# Patient Record
Sex: FEMALE | Race: WHITE | Employment: OTHER | URBAN - METROPOLITAN AREA
[De-identification: names, ages, dates, MRNs, and addresses within clinical notes are randomized per-mention and may not be internally consistent; named-entity substitution may affect disease eponyms.]

---

## 2017-10-10 ENCOUNTER — OFFICE VISIT (OUTPATIENT)
Dept: PODIATRY | Age: 67
End: 2017-10-10
Payer: MEDICARE

## 2017-10-10 VITALS
HEART RATE: 79 BPM | WEIGHT: 210 LBS | HEIGHT: 64 IN | DIASTOLIC BLOOD PRESSURE: 86 MMHG | SYSTOLIC BLOOD PRESSURE: 129 MMHG | BODY MASS INDEX: 35.85 KG/M2

## 2017-10-10 DIAGNOSIS — S90.112A CONTUSION OF LEFT GREAT TOE WITHOUT DAMAGE TO NAIL, INITIAL ENCOUNTER: ICD-10-CM

## 2017-10-10 DIAGNOSIS — M79.675 PAIN IN TOE OF LEFT FOOT: ICD-10-CM

## 2017-10-10 DIAGNOSIS — B35.1 ONYCHOMYCOSIS OF TOENAIL: Primary | ICD-10-CM

## 2017-10-10 PROCEDURE — 1090F PRES/ABSN URINE INCON ASSESS: CPT | Performed by: PODIATRIST

## 2017-10-10 PROCEDURE — 99213 OFFICE O/P EST LOW 20 MIN: CPT | Performed by: PODIATRIST

## 2017-10-10 PROCEDURE — 1123F ACP DISCUSS/DSCN MKR DOCD: CPT | Performed by: PODIATRIST

## 2017-10-10 PROCEDURE — 11720 DEBRIDE NAIL 1-5: CPT | Performed by: PODIATRIST

## 2017-10-10 PROCEDURE — G8427 DOCREV CUR MEDS BY ELIG CLIN: HCPCS | Performed by: PODIATRIST

## 2017-10-10 PROCEDURE — 1036F TOBACCO NON-USER: CPT | Performed by: PODIATRIST

## 2017-10-10 PROCEDURE — G8484 FLU IMMUNIZE NO ADMIN: HCPCS | Performed by: PODIATRIST

## 2017-10-10 PROCEDURE — 4040F PNEUMOC VAC/ADMIN/RCVD: CPT | Performed by: PODIATRIST

## 2017-10-10 PROCEDURE — G8400 PT W/DXA NO RESULTS DOC: HCPCS | Performed by: PODIATRIST

## 2017-10-10 PROCEDURE — 3014F SCREEN MAMMO DOC REV: CPT | Performed by: PODIATRIST

## 2017-10-10 PROCEDURE — 3017F COLORECTAL CA SCREEN DOC REV: CPT | Performed by: PODIATRIST

## 2017-10-10 PROCEDURE — G8417 CALC BMI ABV UP PARAM F/U: HCPCS | Performed by: PODIATRIST

## 2017-10-10 ASSESSMENT — ENCOUNTER SYMPTOMS
COLOR CHANGE: 0
SHORTNESS OF BREATH: 0
NAUSEA: 0
DIARRHEA: 0
BACK PAIN: 0

## 2017-10-10 NOTE — PROGRESS NOTES
Olivre Durbin is a 79 y.o. female who presents to the office today with chief complaint of pain to the left big toenail. Chief Complaint   Patient presents with    Nail Problem     painful left hallux toenail   Symptoms began about 1 week(s) ago. Patient complains of injury to the left big toe. Patient states that the foot was stepped on. Pain is rated 4 out of 10 at it's worst and is described as intermittent. Treatments prior to today's visit include: None. Allergies   Allergen Reactions    Ciprofloxacin     Codeine     Lisinopril     Morphine     Norvasc [Amlodipine Besylate]     Penicillins     Sanctura [Trospium] Nausea Only     Bloated, nauseated    Xarelto [Rivaroxaban]        Past Medical History:   Diagnosis Date    Hyperlipidemia     Hypertension        Prior to Admission medications    Medication Sig Start Date End Date Taking?  Authorizing Provider   esomeprazole (NEXIUM) 20 MG delayed release capsule Take 2 capsules by mouth daily 10/5/17  Yes Paradise Gibbs MD   spironolactone (ALDACTONE) 50 MG tablet TAKE ONE TABLET BY MOUTH DAILY 9/29/17  Yes Paradise Gibbs MD   cloNIDine (CATAPRES) 0.3 MG tablet TAKE ONE TABLET BY MOUTH ONCE NIGHTLY 9/5/17  Yes Paradise Gibbs MD   oxybutynin (DITROPAN) 5 MG tablet Take 5 mg by mouth 2 times daily   Yes Historical Provider, MD   simvastatin (ZOCOR) 20 MG tablet Take 1 tablet by mouth daily Indications: High Amount of Fats in the Blood 2/21/17  Yes Paradise Gibbs MD   Calcium Carbonate-Vitamin D (CALCIUM + D PO) Take by mouth Indications: take as directed   Yes Historical Provider, MD   Misc Natural Taulia ([address] [place] [address]) TABS Take by mouth Indications: take as directed   Yes Historical Provider, MD   Multiple Vitamin TABS Take 1 tablet by mouth daily   Yes Historical Provider, MD   Ascorbic Acid (VITAMIN C) 500 MG tablet Take 500 mg by mouth daily Indications: take as directed   Yes Historical Provider, MD   Vitamin E 400 UNITS TABS Take by mouth Indications: take as directed   Yes Historical Provider, MD   cefUROXime (CEFTIN) 250 MG tablet Take 1 tablet by mouth 2 times daily for 5 days 10/9/17 10/14/17  Gareth Garza MD   clindamycin (CLEOCIN) 300 MG capsule Take 1 capsule by mouth See Admin Instructions Take 2 caps 1 hr prior to dental maida't then 1 cap twice daily until gone 3/28/16   Ju Walton MD   fluticasone (FLONASE) 50 MCG/ACT nasal spray 2 sprays by Nasal route daily Indications: Stuffy Nose    Historical Provider, MD       Past Surgical History:   Procedure Laterality Date   [address] and 1992    neck and lower back   [address] [facility],  [address]  2006    CYST REMOVAL  02/2017    upper rt back.  TOTAL HIP ARTHROPLASTY Bilateral 2013, 2015    Dr Jodi Clarke History   Problem Relation Age of Onset    Hypertension Mother     Fainting Mother     Hypertension Sister     Heart Disease Maternal Grandmother        Social History   Substance Use Topics    Smoking status: Never Smoker    Smokeless tobacco: Never Used    Alcohol use 0.0 oz/week     1 Glasses of wine, 1 Cans of beer, 1 Shots of liquor per week      Comment: socialy       Review of Systems   Constitutional: Negative for activity change, appetite change, chills, diaphoresis, fatigue and fever. Respiratory: Negative for shortness of breath. Cardiovascular: Negative for leg swelling. Gastrointestinal: Negative for diarrhea and nausea. Endocrine: Negative for cold intolerance, heat intolerance and polyuria. Musculoskeletal: Positive for arthralgias. Negative for back pain, gait problem, joint swelling and myalgias. Skin: Negative for color change, pallor, rash and wound. Allergic/Immunologic: Negative for environmental allergies and food allergies. Neurological: Negative for dizziness, weakness, light-headedness and numbness. Hematological: Does not bruise/bleed easily.    Psychiatric/Behavioral: Negative for behavioral problems, confusion and self-injury. The patient is not nervous/anxious. Vitals:   Vitals:    10/10/17 0827   BP: 129/86   Pulse: 79       General: AAO x 3 in NAD. Integument: There are no rashes, ulcers, or breaks in the skin noted to the bilateral lower extremities. There is no induration, subcutaneous nodules, or tightening of the skin noted to the bilateral.     Toenails 1-5 of the right foot do not present with thickness, elongation, discoloration, brittleness, subungual debris. Toenails 1 of the left foot do present with thickness, discoloration, brittleness, subungual debris. There is lysis to the distal half of this toenail. There is mild pain with palpation to this toenail. There is no ingrowth noted to this toenail. There is no erythema, calor, or edema noted to the left hallux. Interdigital maceration absent to web spaces 1-4, Bilateral.     There are no preulcerative lesions noted to the right foot. There are no preulcerative lesions noted to the left foot. The skin to the bilateral feet is not thin and shiny. The skin to the bilateral feet is warm, supple, and dry. Vascular: DP pulse of the right foot is  palpable. DP pulse of the left foot is  palpable. PT pulse of the right foot is  palpable. PT pulse of the left foot is  palpable. CFT is less than 3 secs to the digits of the right foot. CFT is less than 3 secs to the digits of the left foot. There is no edema noted to the bilateral foot or ankle. There is hair growth noted to the digits of the bilateral feet. There are no varicosities noted to the right foot/ankle. There are no varicosities noted to the left foot/ankle. Erythema is absent to the bilateral feet. Neurological: Reflexes are present to the right plantar foot and to the Achilles tendon. Reflexes are present to the left plantar foot and to the Achilles tendon.      Epicritic sensation is

## 2018-01-23 ENCOUNTER — OFFICE VISIT (OUTPATIENT)
Dept: PODIATRY | Age: 68
End: 2018-01-23
Payer: MEDICARE

## 2018-01-23 VITALS
DIASTOLIC BLOOD PRESSURE: 79 MMHG | WEIGHT: 205 LBS | BODY MASS INDEX: 34.16 KG/M2 | HEIGHT: 65 IN | HEART RATE: 72 BPM | SYSTOLIC BLOOD PRESSURE: 117 MMHG

## 2018-01-23 DIAGNOSIS — B35.1 ONYCHOMYCOSIS OF TOENAIL: Primary | ICD-10-CM

## 2018-01-23 DIAGNOSIS — M79.675 PAIN IN TOE OF LEFT FOOT: ICD-10-CM

## 2018-01-23 DIAGNOSIS — L60.0 OC (ONYCHOCRYPTOSIS): ICD-10-CM

## 2018-01-23 PROCEDURE — G8400 PT W/DXA NO RESULTS DOC: HCPCS | Performed by: PODIATRIST

## 2018-01-23 PROCEDURE — 1036F TOBACCO NON-USER: CPT | Performed by: PODIATRIST

## 2018-01-23 PROCEDURE — 99213 OFFICE O/P EST LOW 20 MIN: CPT | Performed by: PODIATRIST

## 2018-01-23 PROCEDURE — 11720 DEBRIDE NAIL 1-5: CPT | Performed by: PODIATRIST

## 2018-01-23 PROCEDURE — G8484 FLU IMMUNIZE NO ADMIN: HCPCS | Performed by: PODIATRIST

## 2018-01-23 PROCEDURE — G8427 DOCREV CUR MEDS BY ELIG CLIN: HCPCS | Performed by: PODIATRIST

## 2018-01-23 PROCEDURE — 3014F SCREEN MAMMO DOC REV: CPT | Performed by: PODIATRIST

## 2018-01-23 PROCEDURE — 4040F PNEUMOC VAC/ADMIN/RCVD: CPT | Performed by: PODIATRIST

## 2018-01-23 PROCEDURE — G8417 CALC BMI ABV UP PARAM F/U: HCPCS | Performed by: PODIATRIST

## 2018-01-23 PROCEDURE — 1123F ACP DISCUSS/DSCN MKR DOCD: CPT | Performed by: PODIATRIST

## 2018-01-23 PROCEDURE — 1090F PRES/ABSN URINE INCON ASSESS: CPT | Performed by: PODIATRIST

## 2018-01-23 PROCEDURE — 3017F COLORECTAL CA SCREEN DOC REV: CPT | Performed by: PODIATRIST

## 2018-01-23 ASSESSMENT — ENCOUNTER SYMPTOMS
COLOR CHANGE: 0
BACK PAIN: 0
SHORTNESS OF BREATH: 0
DIARRHEA: 0
NAUSEA: 0

## 2018-01-23 NOTE — PROGRESS NOTES
Subjective: Kate Lomax 79 y.o. female that presents with chief complaint of pain to the left great toenail. Chief Complaint   Patient presents with    Nail Problem     possible ingrown left hallux    Patient states that this has been present since just passed last visit. Patient states that she thinks the nail is ingrown again. Patient states that the pain is greatest with shoe wear. Pain is rated 6 out of 10 and is described as intermittent. Review of Systems   Constitutional: Negative for activity change, appetite change, chills, diaphoresis, fatigue and fever. Respiratory: Negative for shortness of breath. Cardiovascular: Negative for leg swelling. Gastrointestinal: Negative for diarrhea and nausea. Endocrine: Negative for cold intolerance, heat intolerance and polyuria. Musculoskeletal: Positive for arthralgias. Negative for back pain, gait problem, joint swelling and myalgias. Skin: Negative for color change, pallor, rash and wound. Allergic/Immunologic: Negative for environmental allergies and food allergies. Neurological: Negative for dizziness, weakness, light-headedness and numbness. Hematological: Does not bruise/bleed easily. Psychiatric/Behavioral: Negative for behavioral problems, confusion and self-injury. The patient is not nervous/anxious. Objective: Clinical evaluation of the patient reveals thickness, discoloration, brittleness, elongation, and subungual debris to the left hallux toenail. There is incurvation noted to the bilateral borders of the left hallux toenail. There is no true ingrowth noted. There is no erythema, calor, drainage, or malodor noted to the left hallux. There is pain with palpation to the bilateral borders of the left hallux. Assessment:   1. Onychomycosis of toenail  NM DEBRIDEMENT OF NAIL(S), 1-5   2. OC (onychocryptosis)  NM DEBRIDEMENT OF NAIL(S), 1-5   3.  Pain in toe of left foot  NM DEBRIDEMENT OF NAIL(S), 1-5         Plan:

## 2018-08-06 PROBLEM — M23.91 ACUTE INTERNAL DERANGEMENT OF RIGHT KNEE: Status: ACTIVE | Noted: 2018-08-06

## 2018-08-21 ENCOUNTER — ANESTHESIA EVENT (OUTPATIENT)
Dept: OPERATING ROOM | Age: 68
End: 2018-08-21
Payer: MEDICARE

## 2018-08-21 ENCOUNTER — HOSPITAL ENCOUNTER (OUTPATIENT)
Age: 68
Setting detail: OUTPATIENT SURGERY
Discharge: HOME | End: 2018-08-21
Attending: SURGERY | Admitting: SURGERY
Payer: MEDICARE

## 2018-08-21 ENCOUNTER — ANESTHESIA (OUTPATIENT)
Dept: OPERATING ROOM | Age: 68
End: 2018-08-21
Payer: MEDICARE

## 2018-08-21 VITALS
HEART RATE: 74 BPM | OXYGEN SATURATION: 98 % | TEMPERATURE: 98.1 F | DIASTOLIC BLOOD PRESSURE: 80 MMHG | RESPIRATION RATE: 16 BRPM | SYSTOLIC BLOOD PRESSURE: 145 MMHG

## 2018-08-21 VITALS
OXYGEN SATURATION: 99 % | RESPIRATION RATE: 22 BRPM | TEMPERATURE: 92.8 F | SYSTOLIC BLOOD PRESSURE: 92 MMHG | DIASTOLIC BLOOD PRESSURE: 53 MMHG

## 2018-08-21 LAB
EKG ATRIAL RATE: 73 BPM
EKG P AXIS: 46 DEGREES
EKG P-R INTERVAL: 150 MS
EKG Q-T INTERVAL: 402 MS
EKG QRS DURATION: 82 MS
EKG QTC CALCULATION (BAZETT): 442 MS
EKG R AXIS: 13 DEGREES
EKG T AXIS: 37 DEGREES
EKG VENTRICULAR RATE: 73 BPM

## 2018-08-21 PROCEDURE — 93005 ELECTROCARDIOGRAM TRACING: CPT

## 2018-08-21 PROCEDURE — 7100000001 HC PACU RECOVERY - ADDTL 15 MIN: Performed by: SURGERY

## 2018-08-21 PROCEDURE — 7100000000 HC PACU RECOVERY - FIRST 15 MIN: Performed by: SURGERY

## 2018-08-21 PROCEDURE — 3700000000 HC ANESTHESIA ATTENDED CARE: Performed by: SURGERY

## 2018-08-21 PROCEDURE — 7100000031 HC ASPR PHASE II RECOVERY - ADDTL 15 MIN: Performed by: SURGERY

## 2018-08-21 PROCEDURE — 2580000003 HC RX 258: Performed by: SURGERY

## 2018-08-21 PROCEDURE — 2500000003 HC RX 250 WO HCPCS

## 2018-08-21 PROCEDURE — 6360000002 HC RX W HCPCS

## 2018-08-21 PROCEDURE — 88305 TISSUE EXAM BY PATHOLOGIST: CPT

## 2018-08-21 PROCEDURE — 7100000011 HC PHASE II RECOVERY - ADDTL 15 MIN: Performed by: SURGERY

## 2018-08-21 PROCEDURE — 7100000010 HC PHASE II RECOVERY - FIRST 15 MIN: Performed by: SURGERY

## 2018-08-21 PROCEDURE — 2709999900 HC NON-CHARGEABLE SUPPLY: Performed by: SURGERY

## 2018-08-21 PROCEDURE — 3609010600 HC COLONOSCOPY POLYPECTOMY SNARE/COLD BIOPSY: Performed by: SURGERY

## 2018-08-21 PROCEDURE — 3700000001 HC ADD 15 MINUTES (ANESTHESIA): Performed by: SURGERY

## 2018-08-21 PROCEDURE — 7100000030 HC ASPR PHASE II RECOVERY - FIRST 15 MIN: Performed by: SURGERY

## 2018-08-21 RX ORDER — LIDOCAINE HYDROCHLORIDE 10 MG/ML
INJECTION, SOLUTION EPIDURAL; INFILTRATION; INTRACAUDAL; PERINEURAL PRN
Status: DISCONTINUED | OUTPATIENT
Start: 2018-08-21 | End: 2018-08-21 | Stop reason: SDUPTHER

## 2018-08-21 RX ORDER — DIPHENHYDRAMINE HYDROCHLORIDE 50 MG/ML
12.5 INJECTION INTRAMUSCULAR; INTRAVENOUS
Status: DISCONTINUED | OUTPATIENT
Start: 2018-08-21 | End: 2018-08-21 | Stop reason: HOSPADM

## 2018-08-21 RX ORDER — MIDAZOLAM HYDROCHLORIDE 1 MG/ML
INJECTION INTRAMUSCULAR; INTRAVENOUS PRN
Status: DISCONTINUED | OUTPATIENT
Start: 2018-08-21 | End: 2018-08-21 | Stop reason: SDUPTHER

## 2018-08-21 RX ORDER — ONDANSETRON 2 MG/ML
INJECTION INTRAMUSCULAR; INTRAVENOUS PRN
Status: DISCONTINUED | OUTPATIENT
Start: 2018-08-21 | End: 2018-08-21 | Stop reason: SDUPTHER

## 2018-08-21 RX ORDER — LABETALOL HYDROCHLORIDE 5 MG/ML
5 INJECTION, SOLUTION INTRAVENOUS EVERY 10 MIN PRN
Status: DISCONTINUED | OUTPATIENT
Start: 2018-08-21 | End: 2018-08-21 | Stop reason: HOSPADM

## 2018-08-21 RX ORDER — ONDANSETRON 2 MG/ML
4 INJECTION INTRAMUSCULAR; INTRAVENOUS
Status: DISCONTINUED | OUTPATIENT
Start: 2018-08-21 | End: 2018-08-21 | Stop reason: HOSPADM

## 2018-08-21 RX ORDER — DEXAMETHASONE SODIUM PHOSPHATE 4 MG/ML
INJECTION, SOLUTION INTRA-ARTICULAR; INTRALESIONAL; INTRAMUSCULAR; INTRAVENOUS; SOFT TISSUE PRN
Status: DISCONTINUED | OUTPATIENT
Start: 2018-08-21 | End: 2018-08-21 | Stop reason: SDUPTHER

## 2018-08-21 RX ORDER — FENTANYL CITRATE 50 UG/ML
INJECTION, SOLUTION INTRAMUSCULAR; INTRAVENOUS PRN
Status: DISCONTINUED | OUTPATIENT
Start: 2018-08-21 | End: 2018-08-21 | Stop reason: SDUPTHER

## 2018-08-21 RX ORDER — MORPHINE SULFATE 2 MG/ML
2 INJECTION, SOLUTION INTRAMUSCULAR; INTRAVENOUS EVERY 5 MIN PRN
Status: DISCONTINUED | OUTPATIENT
Start: 2018-08-21 | End: 2018-08-21 | Stop reason: HOSPADM

## 2018-08-21 RX ORDER — PROPOFOL 10 MG/ML
INJECTION, EMULSION INTRAVENOUS PRN
Status: DISCONTINUED | OUTPATIENT
Start: 2018-08-21 | End: 2018-08-21 | Stop reason: SDUPTHER

## 2018-08-21 RX ORDER — MEPERIDINE HYDROCHLORIDE 50 MG/ML
12.5 INJECTION INTRAMUSCULAR; INTRAVENOUS; SUBCUTANEOUS EVERY 5 MIN PRN
Status: DISCONTINUED | OUTPATIENT
Start: 2018-08-21 | End: 2018-08-21 | Stop reason: HOSPADM

## 2018-08-21 RX ORDER — SODIUM CHLORIDE, SODIUM LACTATE, POTASSIUM CHLORIDE, CALCIUM CHLORIDE 600; 310; 30; 20 MG/100ML; MG/100ML; MG/100ML; MG/100ML
INJECTION, SOLUTION INTRAVENOUS CONTINUOUS
Status: DISCONTINUED | OUTPATIENT
Start: 2018-08-21 | End: 2018-08-21 | Stop reason: HOSPADM

## 2018-08-21 RX ADMIN — LIDOCAINE HYDROCHLORIDE 50 MG: 10 INJECTION, SOLUTION EPIDURAL; INFILTRATION; INTRACAUDAL; PERINEURAL at 10:24

## 2018-08-21 RX ADMIN — DEXAMETHASONE SODIUM PHOSPHATE 4 MG: 4 INJECTION, SOLUTION INTRAMUSCULAR; INTRAVENOUS at 10:30

## 2018-08-21 RX ADMIN — ONDANSETRON 4 MG: 2 INJECTION INTRAMUSCULAR; INTRAVENOUS at 10:30

## 2018-08-21 RX ADMIN — MIDAZOLAM 2 MG: 1 INJECTION INTRAMUSCULAR; INTRAVENOUS at 10:19

## 2018-08-21 RX ADMIN — SODIUM CHLORIDE, POTASSIUM CHLORIDE, SODIUM LACTATE AND CALCIUM CHLORIDE: 600; 310; 30; 20 INJECTION, SOLUTION INTRAVENOUS at 09:37

## 2018-08-21 RX ADMIN — FENTANYL CITRATE 50 MCG: 50 INJECTION, SOLUTION INTRAMUSCULAR; INTRAVENOUS at 10:39

## 2018-08-21 RX ADMIN — PROPOFOL 150 MG: 10 INJECTION, EMULSION INTRAVENOUS at 10:24

## 2018-08-21 ASSESSMENT — ENCOUNTER SYMPTOMS
STRIDOR: 0
SHORTNESS OF BREATH: 1

## 2018-08-21 ASSESSMENT — PULMONARY FUNCTION TESTS
PIF_VALUE: 11
PIF_VALUE: 10
PIF_VALUE: 15
PIF_VALUE: 1
PIF_VALUE: 16
PIF_VALUE: 15
PIF_VALUE: 16
PIF_VALUE: 15
PIF_VALUE: 14
PIF_VALUE: 14
PIF_VALUE: 1
PIF_VALUE: 7
PIF_VALUE: 16
PIF_VALUE: 14
PIF_VALUE: 11
PIF_VALUE: 1
PIF_VALUE: 11
PIF_VALUE: 12
PIF_VALUE: 11
PIF_VALUE: 14
PIF_VALUE: 18
PIF_VALUE: 15
PIF_VALUE: 16
PIF_VALUE: 13
PIF_VALUE: 16
PIF_VALUE: 25
PIF_VALUE: 16
PIF_VALUE: 14
PIF_VALUE: 11
PIF_VALUE: 14
PIF_VALUE: 11
PIF_VALUE: 16
PIF_VALUE: 11
PIF_VALUE: 12
PIF_VALUE: 15
PIF_VALUE: 14
PIF_VALUE: 16
PIF_VALUE: 15
PIF_VALUE: 13
PIF_VALUE: 14
PIF_VALUE: 16
PIF_VALUE: 13
PIF_VALUE: 0
PIF_VALUE: 11
PIF_VALUE: 16
PIF_VALUE: 15
PIF_VALUE: 16
PIF_VALUE: 15
PIF_VALUE: 14
PIF_VALUE: 16
PIF_VALUE: 16

## 2018-08-21 ASSESSMENT — PAIN SCALES - GENERAL
PAINLEVEL_OUTOF10: 0

## 2018-08-21 ASSESSMENT — LIFESTYLE VARIABLES: SMOKING_STATUS: 0

## 2018-08-21 NOTE — ANESTHESIA POSTPROCEDURE EVALUATION
POST- ANESTHESIA EVALUATION       Pt Name: Ceferino Barth  MRN: [id number]  YOB: 1950  Date of evaluation: 8/21/2018  Time:  2:26 PM      BP (!) 145/80   Pulse 74   Temp 98.1 °F (36.7 °C) (Temporal)   Resp 16   SpO2 98%      Consciousness Level  Awake  Cardiopulmonary Status  Stable  Pain Adequately Treated YES  Nausea / Vomiting  NO  Adequate Hydration  YES  Anesthesia Related Complications NONE      Electronically signed by Selvin Stuart MD on 8/21/2018 at 2:26 PM       Department of Anesthesiology  Postprocedure Note    Patient: Ceferino Barth  MRN: [id number]  YOB: 1950  Date of evaluation: 8/21/2018  Time:  2:25 PM     Procedure Summary     Date:  08/21/18 Room / Location:  [address] [place] [address] [address] / [facility] [address]    Anesthesia Start:  1019 Anesthesia Stop:  1121    Procedure:  COLONOSCOPY POLYPECTOMY /COLD BX  (N/A ) Diagnosis:  (HISTORY COLON POLYPS   PAT ON ADMIT OFFICE FAX )    Surgeon:  Shi Kimball MD Responsible Provider:  Selvin Stuart MD    Anesthesia Type:  general ASA Status:  3          Anesthesia Type: general    Kevin Phase I: Kevin Score: 10    Kevin Phase II: Kevin Score: 10    Last vitals: Reviewed and per EMR flowsheets.        Anesthesia Post Evaluation

## 2018-08-21 NOTE — H&P
HISTORY and [place] [place] [address]       NAME:  Godwin Pedroza  MRN: [id number]   YOB: 1950   Date: 8/21/2018   Age: 76 y.o. Gender: female       COMPLAINT AND PRESENT HISTORY:   Abagail Gitelman is a 76 yr old female having a colonoscopy today, She had one last about 3 yr ago , She had a polyp then, She has no blood in stool or change in bowel habits,   PAST MEDICAL HISTORY     Past Medical History:   Diagnosis Date    Hyperlipidemia     Hypertension          SURGICAL HISTORY       Past Surgical History:   Procedure Laterality Date    BACK SURGERY  1983 and 1992    neck and lower back    BLADDER SURGERY  2006    CATARACT REMOVAL      COLONOSCOPY  2015    had polyp, done at [facility]. Dr Tonya Jean  02/2017    upper rt back.  TOTAL HIP ARTHROPLASTY Bilateral 2013, 2015    Dr Jackelin Dorman History     Social History    Marital status:      Spouse name: N/A    Number of children: N/A    Years of education: N/A     Social History Main Topics    Smoking status: Never Smoker    Smokeless tobacco: Never Used    Alcohol use 0.0 oz/week     1 Glasses of wine, 1 Cans of beer, 1 Shots of liquor per week      Comment: socialy    Drug use: No    Sexual activity: Not Asked     Other Topics Concern    None     Social History Narrative    None           REVIEW OF SYSTEMS      Allergies   Allergen Reactions    Ciprofloxacin     Codeine     Lisinopril     Morphine     Norvasc [Amlodipine Besylate]     Penicillins     Sanctura [Trospium] Nausea Only     Bloated, nauseated    Xarelto [Rivaroxaban]        No current facility-administered medications on file prior to encounter.       Current Outpatient Prescriptions on File Prior to Encounter   Medication Sig Dispense Refill    spironolactone (ALDACTONE) 50 MG tablet TAKE ONE TABLET BY MOUTH DAILY 90 tablet 3    cloNIDine (CATAPRES) 0.3 MG tablet TAKE ONE TABLET BY MOUTH ONCE NIGHTLY 90 tablet 1    simvastatin (ZOCOR) 20 MG tablet TAKE ONE TABLET BY MOUTH DAILY 90 tablet 2    esomeprazole (NEXIUM) 20 MG delayed release capsule Take 2 capsules by mouth daily 60 capsule 3    oxybutynin (DITROPAN) 5 MG tablet Take 5 mg by mouth 2 times daily      Calcium Carbonate-Vitamin D (CALCIUM + D PO) Take by mouth Indications: take as directed      Misc Natural Products (GLUCOSAMINE CHONDROITIN COMPLX) TABS Take by mouth Indications: take as directed      Multiple Vitamin TABS Take 1 tablet by mouth daily      Ascorbic Acid (VITAMIN C) 500 MG tablet Take 500 mg by mouth daily Indications: take as directed      Vitamin E 400 UNITS TABS Take by mouth Indications: take as directed      fluticasone (FLONASE) 50 MCG/ACT nasal spray 2 sprays by Nasal route daily Indications: Stuffy Nose      clindamycin (CLEOCIN) 300 MG capsule Take 1 capsule by mouth See Admin Instructions Take 2 caps 1 hr prior to dental maida't then 1 cap twice daily until gone 6 capsule 0       Negative except for what is mentioned in the HPI. GENERAL PHYSICAL EXAM     Vitals: /82   Pulse 73   Temp 98.4 °F (36.9 °C)   Resp 16   SpO2 99%  There is no height or weight on file to calculate BMI. GENERAL APPEARANCE:   Rj Nurse is 76 y.o.,  female, mildly obese, Alert and good hygiene, Denies any pain at this time. SKIN:  Warm, dry, no rashes              HEAD:  Normocephalic, Face symmetrical.                  EYES:  KURT EOMI and conjugate gaze . EARS:  Ear canals clear, No marked hearing loss. NOSE:  Nares are patent, Mucosa pink and moist .                  THROAT:  Pharynx is clear No loose dentition                   NECK:   Has no carotid bruit,   Neck is supple,   No adenopathy. CHEST:  Symmetrical and equal on expansion.                  HEART:  HT regular No murmer,                LUNGS:  Breath sounds are clear and no wheezes,           ABDOMEN:   Abdomen is rounded and not tender and is  soft on palpation. No guarding or rigidity. No palpable organomegaly. LYMPHATICS:  No palpable cervical lymphadenopathy. LOCOMOTOR, BACK AND SPINE:  Spine is not tender and has no CVA pain                  EXTREMITIES:  Good range of motion of upper and lower extremities, No pedal edema. Grasp strength is - bilaterally     NEUROLOGIC:  Alert and has no problems with speech, No motor deficits.                       PROVISIONAL DIAGNOSES / SURGERY:      For Colonoscopy  Patient Active Problem List    Diagnosis Date Noted    Acute internal derangement of right knee 08/06/2018    Asthma 11/06/2015    Cervical disc disease 11/06/2015    Esophageal reflux 11/06/2015    Fatigue 11/06/2015    Hip joint stiffness 11/06/2015    Hyperlipidemia 11/06/2015    Hypertension 11/06/2015    Hypokalemia 11/06/2015    Joint pain, hip 11/06/2015    Lower back pain 11/06/2015    Muscle spasm 11/06/2015    Nasal congestion 11/06/2015    Neck pain 11/06/2015    Osteoarthritis of hip 11/06/2015    Osteoarthritis of neck 11/06/2015    Sacroiliac strain 11/06/2015    Scoliosis 11/06/2015    Shortness of breath 11/06/2015    Snoring 11/06/2015    Tachycardia 11/06/2015    Urinary incontinence 11/06/2015    Weight gain 11/06/2015           YARA Prince - CNP on 8/21/2018 at 9:41 AM

## 2018-08-21 NOTE — OP NOTE
PROCEDURE NOTE    DATE OF PROCEDURE: 8/21/2018    SURGEON: Lacy Bourne MD    ASSISTANT: None    PREOPERATIVE DIAGNOSIS: History of colon polyp    POSTOPERATIVE DIAGNOSIS: Rectal polyp/sigmoid diverticulosis/redundant colon/suboptimal prep    OPERATION: Total colonoscopy to cecum with rectal polypectomy with large cold biopsy forceps    ANESTHESIA: General    ESTIMATED BLOOD LOSS: None    COMPLICATIONS: None     SPECIMENS:  Was Obtained: Rectal polyp    HISTORY: The patient is a 76y.o. year old female with history of above preop diagnosis. I recommended colonoscopy with possible biopsy or polypectomy and I explained the risk, benefits, expected outcome, and alternatives to the procedure. Risks included but are not limited to bleeding, infection, respiratory distress, hypotension, and perforation of the colon and possibility of missing a lesion. The patient understands and is in agreement. PROCEDURE: The patient was given IV conscious sedation. The patient's SPO2 remained above 90% throughout the procedure. Digital rectal exam was normal.  The colonoscope was inserted through the anus into the rectum and advanced under direct vision to the cecum without difficulty. Terminal ileum was examined for approximately 2 inches. The prep was Suboptimal.      Findings:    Cecum/Ascending colon: normal    Transverse colon: normal    Descending/Sigmoid colon: abnormal: Mild sigmoid diverticulosis    Rectum/Anus: examined in normal and retroflexed positions and was abnormal: Rectal polyp removed with large core biopsy forceps  All the above areas were examined and visualization was limited because of suboptimal prep but no other gross lesions colitis seen. Withdrawal Time was (minutes): 14      Next screening colonoscopy: 3 years. If screening is less than 10 years the recommended reason is due:polyps    The colon was decompressed.   While withdrawing the scope the above findings were verified and the scope was removed. The patient tolerated the procedure and conscious sedation without unusual events. In the recovery room patient was examined and remains hemodynamically stable. Discharge home when criteria met. Recommendations/Plan:   1. F/U Biopsies  2. F/U In Office as instructed  3. Discussed with the family  4. High fiber diet   5.  Precautions to avoid constipation    Electronically signed by Corby Chiang MD  on 8/21/2018 at 11:12 AM

## 2018-08-21 NOTE — ANESTHESIA PRE PROCEDURE
07/02/18 128/86       NPO Status:                                                                                 BMI:   Wt Readings from Last 3 Encounters:   08/06/18 215 lb (97.5 kg)   07/30/18 215 lb 9.6 oz (97.8 kg)   07/02/18 214 lb 3.2 oz (97.2 kg)     There is no height or weight on file to calculate BMI.    CBC: No results found for: WBC, RBC, HGB, HCT, MCV, RDW, PLT    CMP: No results found for: NA, K, CL, CO2, BUN, CREATININE, GFRAA, AGRATIO, LABGLOM, GLUCOSE, PROT, CALCIUM, BILITOT, ALKPHOS, AST, ALT    POC Tests: No results for input(s): POCGLU, POCNA, POCK, POCCL, POCBUN, POCHEMO, POCHCT in the last 72 hours. Coags: No results found for: PROTIME, INR, APTT    HCG (If Applicable): No results found for: PREGTESTUR, PREGSERUM, HCG, HCGQUANT     ABGs: No results found for: PHART, PO2ART, FJS3QZS, ZZC5JTG, BEART, K7PHCHFT     Type & Screen (If Applicable):  No results found for: [facility]    Anesthesia Evaluation  Patient summary reviewed no history of anesthetic complications:   Airway: Mallampati: III  TM distance: >3 FB   Neck ROM: full  Mouth opening: > = 3 FB Dental: normal exam         Pulmonary:normal exam    (+) shortness of breath: no interval change,  asthma: allergic asthma,     (-) pneumonia, COPD, recent URI, sleep apnea, rhonchi, wheezes, rales, stridor and not a current smoker          Patient did not smoke on day of surgery.                  Cardiovascular:  Exercise tolerance: good (>4 METS),   (+) hypertension: no interval change,     (-) pacemaker, valvular problems/murmurs, past MI, CAD, CABG/stent, dysrhythmias,  angina,  CHF, orthopnea, PND,  ALVAREZ, murmur, weak pulses,  friction rub, systolic click, carotid bruit,  JVD and peripheral edema      Rhythm: regular  Rate: normal           Beta Blocker:  Not on Beta Blocker         Neuro/Psych:   Negative Neuro/Psych ROS     (-) seizures, neuromuscular disease, TIA, CVA, headaches, psychiatric history and depression/anxiety

## 2018-08-22 LAB — SURGICAL PATHOLOGY REPORT: NORMAL

## 2018-09-20 ENCOUNTER — OFFICE VISIT (OUTPATIENT)
Dept: ORTHOPEDIC SURGERY | Age: 68
End: 2018-09-20
Payer: MEDICARE

## 2018-09-20 VITALS — OXYGEN SATURATION: 97 % | BODY MASS INDEX: 34.15 KG/M2 | HEIGHT: 64 IN | WEIGHT: 200 LBS | HEART RATE: 91 BPM

## 2018-09-20 DIAGNOSIS — S89.90XA KNEE INJURY, INITIAL ENCOUNTER: Primary | ICD-10-CM

## 2018-09-20 PROCEDURE — G8400 PT W/DXA NO RESULTS DOC: HCPCS | Performed by: ORTHOPAEDIC SURGERY

## 2018-09-20 PROCEDURE — 1123F ACP DISCUSS/DSCN MKR DOCD: CPT | Performed by: ORTHOPAEDIC SURGERY

## 2018-09-20 PROCEDURE — G8417 CALC BMI ABV UP PARAM F/U: HCPCS | Performed by: ORTHOPAEDIC SURGERY

## 2018-09-20 PROCEDURE — G8428 CUR MEDS NOT DOCUMENT: HCPCS | Performed by: ORTHOPAEDIC SURGERY

## 2018-09-20 PROCEDURE — 1101F PT FALLS ASSESS-DOCD LE1/YR: CPT | Performed by: ORTHOPAEDIC SURGERY

## 2018-09-20 PROCEDURE — 4040F PNEUMOC VAC/ADMIN/RCVD: CPT | Performed by: ORTHOPAEDIC SURGERY

## 2018-09-20 PROCEDURE — 1036F TOBACCO NON-USER: CPT | Performed by: ORTHOPAEDIC SURGERY

## 2018-09-20 PROCEDURE — 99203 OFFICE O/P NEW LOW 30 MIN: CPT | Performed by: ORTHOPAEDIC SURGERY

## 2018-09-20 PROCEDURE — 3014F SCREEN MAMMO DOC REV: CPT | Performed by: ORTHOPAEDIC SURGERY

## 2018-09-20 PROCEDURE — 3017F COLORECTAL CA SCREEN DOC REV: CPT | Performed by: ORTHOPAEDIC SURGERY

## 2018-09-20 PROCEDURE — 1090F PRES/ABSN URINE INCON ASSESS: CPT | Performed by: ORTHOPAEDIC SURGERY

## 2018-09-20 NOTE — PROGRESS NOTES
Dharmesh Paredes M.D.            [address] [facility] [address] [facility] [address].             Dept Phone: [phone]             Dept Fax:  [phone] Shellie Mcgee returns today with a new issue regarding her right knee. She status post bilateral total hips in the past.  She states that she was at the [facility] fair this past July coming down the slide with her granddaughter her leg got caught behind her on her right side. She's had no previous workup. She's describes stabbing catching pain in right knee. She states that she feels like a something's stocking inside. Examination patient right knee notes a slight effusion. Her motion is 2125 degrees. She does have a positive reproducible Manny's medially. Collaterals are good cruciates are good no Tender stable Homans    Patient did have an MRI that actually was performed today at the [facility]. We do not have report but I was able to visualize this. On the patient's sagittal T2 stir I was able to visualize a type III change within the posterior horn medial meniscus consistent with a meniscus tear. This is compatible with her physical examination    Impression  Medial meniscus tear right knee    Plan  Patient was advised that arthroscopic intervention may be indicated she does wish proceed as she is affected by this. She was made aware risk and benefits. We'll see her back here postoperatively          Review of Systems   Constitutional: Negative. HENT: Negative. Respiratory: Negative. Cardiovascular: Negative. Musculoskeletal: Negative. Neurological: Negative. Past Medical History:   Diagnosis Date    Hyperlipidemia     Hypertension      Past Surgical History:   Procedure Laterality Date    BACK SURGERY  1983 and 1992    neck and lower back    BLADDER SURGERY  2006    CATARACT REMOVAL      COLONOSCOPY  2015    had polyp, done at [facility].  Dr Sang Syed

## 2018-09-25 ENCOUNTER — HOSPITAL ENCOUNTER (OUTPATIENT)
Dept: PREADMISSION TESTING | Age: 68
Discharge: HOME | End: 2018-09-29
Payer: MEDICARE

## 2018-09-25 VITALS
SYSTOLIC BLOOD PRESSURE: 131 MMHG | TEMPERATURE: 98.6 F | WEIGHT: 214 LBS | OXYGEN SATURATION: 96 % | DIASTOLIC BLOOD PRESSURE: 86 MMHG | BODY MASS INDEX: 36.54 KG/M2 | HEIGHT: 64 IN | RESPIRATION RATE: 16 BRPM | HEART RATE: 78 BPM

## 2018-09-25 LAB
ABSOLUTE EOS #: 0.1 K/UL (ref 0–0.4)
ABSOLUTE IMMATURE GRANULOCYTE: ABNORMAL K/UL (ref 0–0.3)
ABSOLUTE LYMPH #: 1.9 K/UL (ref 1–4.8)
ABSOLUTE MONO #: 0.6 K/UL (ref 0.1–1.3)
ANION GAP SERPL CALCULATED.3IONS-SCNC: 13 MMOL/L (ref 9–17)
BASOPHILS # BLD: 1 % (ref 0–2)
BASOPHILS ABSOLUTE: 0 K/UL (ref 0–0.2)
BUN BLDV-MCNC: 17 MG/DL (ref 8–23)
BUN/CREAT BLD: ABNORMAL (ref 9–20)
CALCIUM SERPL-MCNC: 9.2 MG/DL (ref 8.6–10.4)
CHLORIDE BLD-SCNC: 104 MMOL/L (ref 98–107)
CO2: 25 MMOL/L (ref 20–31)
CREAT SERPL-MCNC: 1.21 MG/DL (ref 0.5–0.9)
DIFFERENTIAL TYPE: ABNORMAL
EOSINOPHILS RELATIVE PERCENT: 2 % (ref 0–4)
GFR AFRICAN AMERICAN: 54 ML/MIN
GFR NON-AFRICAN AMERICAN: 44 ML/MIN
GFR SERPL CREATININE-BSD FRML MDRD: ABNORMAL ML/MIN/{1.73_M2}
GFR SERPL CREATININE-BSD FRML MDRD: ABNORMAL ML/MIN/{1.73_M2}
GLUCOSE BLD-MCNC: 98 MG/DL (ref 70–99)
HCT VFR BLD CALC: 44.5 % (ref 36–46)
HEMOGLOBIN: 14.5 G/DL (ref 12–16)
IMMATURE GRANULOCYTES: ABNORMAL %
LYMPHOCYTES # BLD: 29 % (ref 24–44)
MCH RBC QN AUTO: 27.5 PG (ref 26–34)
MCHC RBC AUTO-ENTMCNC: 32.7 G/DL (ref 31–37)
MCV RBC AUTO: 84.1 FL (ref 80–100)
MONOCYTES # BLD: 9 % (ref 1–7)
NRBC AUTOMATED: ABNORMAL PER 100 WBC
PDW BLD-RTO: 13.4 % (ref 11.5–14.9)
PLATELET # BLD: 269 K/UL (ref 150–450)
PLATELET ESTIMATE: ABNORMAL
PMV BLD AUTO: 7.9 FL (ref 6–12)
POTASSIUM SERPL-SCNC: 4.3 MMOL/L (ref 3.7–5.3)
RBC # BLD: 5.29 M/UL (ref 4–5.2)
RBC # BLD: ABNORMAL 10*6/UL
SEG NEUTROPHILS: 59 % (ref 36–66)
SEGMENTED NEUTROPHILS ABSOLUTE COUNT: 4 K/UL (ref 1.3–9.1)
SODIUM BLD-SCNC: 142 MMOL/L (ref 135–144)
WBC # BLD: 6.7 K/UL (ref 3.5–11)
WBC # BLD: ABNORMAL 10*3/UL

## 2018-09-25 PROCEDURE — 80048 BASIC METABOLIC PNL TOTAL CA: CPT

## 2018-09-25 PROCEDURE — 85025 COMPLETE CBC W/AUTO DIFF WBC: CPT

## 2018-09-25 PROCEDURE — 36415 COLL VENOUS BLD VENIPUNCTURE: CPT

## 2018-09-25 ASSESSMENT — ENCOUNTER SYMPTOMS
BACK PAIN: 0
NAUSEA: 0
DOUBLE VISION: 0
SORE THROAT: 0
DIARRHEA: 0
SINUS PAIN: 0
CONSTIPATION: 0
VOMITING: 0
SHORTNESS OF BREATH: 0
BLOOD IN STOOL: 0
BLURRED VISION: 0
COUGH: 0
ABDOMINAL PAIN: 0

## 2018-09-25 ASSESSMENT — PAIN SCALES - GENERAL: PAINLEVEL_OUTOF10: 2

## 2018-09-25 ASSESSMENT — PAIN DESCRIPTION - LOCATION: LOCATION: KNEE

## 2018-09-25 ASSESSMENT — PAIN DESCRIPTION - DESCRIPTORS: DESCRIPTORS: TIGHTNESS

## 2018-09-25 ASSESSMENT — PAIN DESCRIPTION - ORIENTATION: ORIENTATION: RIGHT

## 2018-09-26 ENCOUNTER — ANESTHESIA EVENT (OUTPATIENT)
Dept: OPERATING ROOM | Age: 68
End: 2018-09-26
Payer: MEDICARE

## 2018-09-26 RX ORDER — LIDOCAINE HYDROCHLORIDE 10 MG/ML
1 INJECTION, SOLUTION EPIDURAL; INFILTRATION; INTRACAUDAL; PERINEURAL
Status: CANCELLED | OUTPATIENT
Start: 2018-09-26 | End: 2018-09-26

## 2018-09-26 RX ORDER — SODIUM CHLORIDE 0.9 % (FLUSH) 0.9 %
10 SYRINGE (ML) INJECTION EVERY 12 HOURS SCHEDULED
Status: CANCELLED | OUTPATIENT
Start: 2018-09-26

## 2018-09-26 RX ORDER — SODIUM CHLORIDE, SODIUM LACTATE, POTASSIUM CHLORIDE, CALCIUM CHLORIDE 600; 310; 30; 20 MG/100ML; MG/100ML; MG/100ML; MG/100ML
INJECTION, SOLUTION INTRAVENOUS CONTINUOUS
Status: CANCELLED | OUTPATIENT
Start: 2018-09-26

## 2018-09-26 RX ORDER — SODIUM CHLORIDE 0.9 % (FLUSH) 0.9 %
10 SYRINGE (ML) INJECTION PRN
Status: CANCELLED | OUTPATIENT
Start: 2018-09-26

## 2018-10-04 ENCOUNTER — HOSPITAL ENCOUNTER (OUTPATIENT)
Age: 68
Setting detail: OUTPATIENT SURGERY
Discharge: HOME | End: 2018-10-04
Attending: ORTHOPAEDIC SURGERY | Admitting: ORTHOPAEDIC SURGERY
Payer: MEDICARE

## 2018-10-04 ENCOUNTER — ANESTHESIA (OUTPATIENT)
Dept: OPERATING ROOM | Age: 68
End: 2018-10-04
Payer: MEDICARE

## 2018-10-04 VITALS
SYSTOLIC BLOOD PRESSURE: 161 MMHG | RESPIRATION RATE: 16 BRPM | TEMPERATURE: 94.6 F | DIASTOLIC BLOOD PRESSURE: 91 MMHG | OXYGEN SATURATION: 96 %

## 2018-10-04 VITALS
WEIGHT: 214 LBS | RESPIRATION RATE: 20 BRPM | TEMPERATURE: 98 F | HEART RATE: 78 BPM | DIASTOLIC BLOOD PRESSURE: 61 MMHG | SYSTOLIC BLOOD PRESSURE: 121 MMHG | BODY MASS INDEX: 36.54 KG/M2 | OXYGEN SATURATION: 94 % | HEIGHT: 64 IN

## 2018-10-04 DIAGNOSIS — M23.91 ACUTE INTERNAL DERANGEMENT OF RIGHT KNEE: Primary | ICD-10-CM

## 2018-10-04 PROCEDURE — 6370000000 HC RX 637 (ALT 250 FOR IP): Performed by: ANESTHESIOLOGY

## 2018-10-04 PROCEDURE — 3600000013 HC SURGERY LEVEL 3 ADDTL 15MIN: Performed by: ORTHOPAEDIC SURGERY

## 2018-10-04 PROCEDURE — 7100000030 HC ASPR PHASE II RECOVERY - FIRST 15 MIN: Performed by: ORTHOPAEDIC SURGERY

## 2018-10-04 PROCEDURE — 7100000001 HC PACU RECOVERY - ADDTL 15 MIN: Performed by: ORTHOPAEDIC SURGERY

## 2018-10-04 PROCEDURE — 3700000000 HC ANESTHESIA ATTENDED CARE: Performed by: ORTHOPAEDIC SURGERY

## 2018-10-04 PROCEDURE — 3600000003 HC SURGERY LEVEL 3 BASE: Performed by: ORTHOPAEDIC SURGERY

## 2018-10-04 PROCEDURE — 3700000001 HC ADD 15 MINUTES (ANESTHESIA): Performed by: ORTHOPAEDIC SURGERY

## 2018-10-04 PROCEDURE — 2580000003 HC RX 258: Performed by: ANESTHESIOLOGY

## 2018-10-04 PROCEDURE — 7100000031 HC ASPR PHASE II RECOVERY - ADDTL 15 MIN: Performed by: ORTHOPAEDIC SURGERY

## 2018-10-04 PROCEDURE — 29881 ARTHRS KNE SRG MNISECTMY M/L: CPT | Performed by: ORTHOPAEDIC SURGERY

## 2018-10-04 PROCEDURE — 2709999900 HC NON-CHARGEABLE SUPPLY: Performed by: ORTHOPAEDIC SURGERY

## 2018-10-04 PROCEDURE — 7100000000 HC PACU RECOVERY - FIRST 15 MIN: Performed by: ORTHOPAEDIC SURGERY

## 2018-10-04 PROCEDURE — 2500000003 HC RX 250 WO HCPCS: Performed by: NURSE ANESTHETIST, CERTIFIED REGISTERED

## 2018-10-04 PROCEDURE — 6360000002 HC RX W HCPCS: Performed by: ORTHOPAEDIC SURGERY

## 2018-10-04 PROCEDURE — 6360000002 HC RX W HCPCS: Performed by: NURSE ANESTHETIST, CERTIFIED REGISTERED

## 2018-10-04 RX ORDER — KETOROLAC TROMETHAMINE 30 MG/ML
INJECTION, SOLUTION INTRAMUSCULAR; INTRAVENOUS PRN
Status: DISCONTINUED | OUTPATIENT
Start: 2018-10-04 | End: 2018-10-04 | Stop reason: SDUPTHER

## 2018-10-04 RX ORDER — FENTANYL CITRATE 50 UG/ML
50 INJECTION, SOLUTION INTRAMUSCULAR; INTRAVENOUS EVERY 5 MIN PRN
Status: DISCONTINUED | OUTPATIENT
Start: 2018-10-04 | End: 2018-10-04 | Stop reason: HOSPADM

## 2018-10-04 RX ORDER — FENTANYL CITRATE 50 UG/ML
INJECTION, SOLUTION INTRAMUSCULAR; INTRAVENOUS PRN
Status: DISCONTINUED | OUTPATIENT
Start: 2018-10-04 | End: 2018-10-04 | Stop reason: SDUPTHER

## 2018-10-04 RX ORDER — ONDANSETRON 2 MG/ML
4 INJECTION INTRAMUSCULAR; INTRAVENOUS
Status: DISCONTINUED | OUTPATIENT
Start: 2018-10-04 | End: 2018-10-04 | Stop reason: HOSPADM

## 2018-10-04 RX ORDER — LABETALOL HYDROCHLORIDE 5 MG/ML
5 INJECTION, SOLUTION INTRAVENOUS EVERY 10 MIN PRN
Status: DISCONTINUED | OUTPATIENT
Start: 2018-10-04 | End: 2018-10-04 | Stop reason: HOSPADM

## 2018-10-04 RX ORDER — DIPHENHYDRAMINE HYDROCHLORIDE 50 MG/ML
12.5 INJECTION INTRAMUSCULAR; INTRAVENOUS
Status: DISCONTINUED | OUTPATIENT
Start: 2018-10-04 | End: 2018-10-04 | Stop reason: HOSPADM

## 2018-10-04 RX ORDER — MIDAZOLAM HYDROCHLORIDE 1 MG/ML
INJECTION INTRAMUSCULAR; INTRAVENOUS PRN
Status: DISCONTINUED | OUTPATIENT
Start: 2018-10-04 | End: 2018-10-04 | Stop reason: SDUPTHER

## 2018-10-04 RX ORDER — HYDROCODONE BITARTRATE AND ACETAMINOPHEN 5; 325 MG/1; MG/1
1-2 TABLET ORAL EVERY 4 HOURS PRN
Qty: 20 TABLET | Refills: 0 | Status: SHIPPED | OUTPATIENT
Start: 2018-10-04 | End: 2018-10-11

## 2018-10-04 RX ORDER — MEPERIDINE HYDROCHLORIDE 50 MG/ML
12.5 INJECTION INTRAMUSCULAR; INTRAVENOUS; SUBCUTANEOUS EVERY 5 MIN PRN
Status: DISCONTINUED | OUTPATIENT
Start: 2018-10-04 | End: 2018-10-04 | Stop reason: HOSPADM

## 2018-10-04 RX ORDER — HYDROCODONE BITARTRATE AND ACETAMINOPHEN 5; 325 MG/1; MG/1
1 TABLET ORAL ONCE
Status: COMPLETED | OUTPATIENT
Start: 2018-10-04 | End: 2018-10-04

## 2018-10-04 RX ORDER — LIDOCAINE HYDROCHLORIDE 10 MG/ML
1 INJECTION, SOLUTION EPIDURAL; INFILTRATION; INTRACAUDAL; PERINEURAL
Status: DISCONTINUED | OUTPATIENT
Start: 2018-10-04 | End: 2018-10-04 | Stop reason: HOSPADM

## 2018-10-04 RX ORDER — PROPOFOL 10 MG/ML
INJECTION, EMULSION INTRAVENOUS PRN
Status: DISCONTINUED | OUTPATIENT
Start: 2018-10-04 | End: 2018-10-04 | Stop reason: SDUPTHER

## 2018-10-04 RX ORDER — LIDOCAINE HYDROCHLORIDE 20 MG/ML
INJECTION, SOLUTION INFILTRATION; PERINEURAL PRN
Status: DISCONTINUED | OUTPATIENT
Start: 2018-10-04 | End: 2018-10-04 | Stop reason: SDUPTHER

## 2018-10-04 RX ORDER — ROPIVACAINE HYDROCHLORIDE 5 MG/ML
INJECTION, SOLUTION EPIDURAL; INFILTRATION; PERINEURAL PRN
Status: DISCONTINUED | OUTPATIENT
Start: 2018-10-04 | End: 2018-10-04 | Stop reason: HOSPADM

## 2018-10-04 RX ORDER — SODIUM CHLORIDE, SODIUM LACTATE, POTASSIUM CHLORIDE, CALCIUM CHLORIDE 600; 310; 30; 20 MG/100ML; MG/100ML; MG/100ML; MG/100ML
INJECTION, SOLUTION INTRAVENOUS CONTINUOUS
Status: DISCONTINUED | OUTPATIENT
Start: 2018-10-04 | End: 2018-10-04 | Stop reason: HOSPADM

## 2018-10-04 RX ORDER — EPHEDRINE SULFATE/0.9% NACL/PF 50 MG/5 ML
SYRINGE (ML) INTRAVENOUS PRN
Status: DISCONTINUED | OUTPATIENT
Start: 2018-10-04 | End: 2018-10-04 | Stop reason: SDUPTHER

## 2018-10-04 RX ORDER — SODIUM CHLORIDE 0.9 % (FLUSH) 0.9 %
10 SYRINGE (ML) INJECTION EVERY 12 HOURS SCHEDULED
Status: DISCONTINUED | OUTPATIENT
Start: 2018-10-04 | End: 2018-10-04 | Stop reason: HOSPADM

## 2018-10-04 RX ORDER — DEXAMETHASONE SODIUM PHOSPHATE 4 MG/ML
INJECTION, SOLUTION INTRA-ARTICULAR; INTRALESIONAL; INTRAMUSCULAR; INTRAVENOUS; SOFT TISSUE PRN
Status: DISCONTINUED | OUTPATIENT
Start: 2018-10-04 | End: 2018-10-04 | Stop reason: SDUPTHER

## 2018-10-04 RX ORDER — SODIUM CHLORIDE 0.9 % (FLUSH) 0.9 %
10 SYRINGE (ML) INJECTION PRN
Status: DISCONTINUED | OUTPATIENT
Start: 2018-10-04 | End: 2018-10-04 | Stop reason: HOSPADM

## 2018-10-04 RX ORDER — ONDANSETRON 2 MG/ML
INJECTION INTRAMUSCULAR; INTRAVENOUS PRN
Status: DISCONTINUED | OUTPATIENT
Start: 2018-10-04 | End: 2018-10-04 | Stop reason: SDUPTHER

## 2018-10-04 RX ADMIN — SODIUM CHLORIDE, POTASSIUM CHLORIDE, SODIUM LACTATE AND CALCIUM CHLORIDE: 600; 310; 30; 20 INJECTION, SOLUTION INTRAVENOUS at 08:20

## 2018-10-04 RX ADMIN — FENTANYL CITRATE 75 MCG: 50 INJECTION, SOLUTION INTRAMUSCULAR; INTRAVENOUS at 08:05

## 2018-10-04 RX ADMIN — MIDAZOLAM 1 MG: 1 INJECTION INTRAMUSCULAR; INTRAVENOUS at 08:03

## 2018-10-04 RX ADMIN — Medication 10 MG: at 08:10

## 2018-10-04 RX ADMIN — ONDANSETRON 4 MG: 2 INJECTION INTRAMUSCULAR; INTRAVENOUS at 08:20

## 2018-10-04 RX ADMIN — DEXAMETHASONE SODIUM PHOSPHATE 4 MG: 4 INJECTION, SOLUTION INTRAMUSCULAR; INTRAVENOUS at 08:20

## 2018-10-04 RX ADMIN — LIDOCAINE HYDROCHLORIDE 60 MG: 20 INJECTION, SOLUTION INFILTRATION; PERINEURAL at 08:05

## 2018-10-04 RX ADMIN — FENTANYL CITRATE 25 MCG: 50 INJECTION, SOLUTION INTRAMUSCULAR; INTRAVENOUS at 08:23

## 2018-10-04 RX ADMIN — Medication 10 MG: at 08:09

## 2018-10-04 RX ADMIN — MIDAZOLAM 1 MG: 1 INJECTION INTRAMUSCULAR; INTRAVENOUS at 08:00

## 2018-10-04 RX ADMIN — Medication 10 MG: at 08:14

## 2018-10-04 RX ADMIN — HYDROCODONE BITARTRATE AND ACETAMINOPHEN 1 TABLET: 5; 325 TABLET ORAL at 09:55

## 2018-10-04 RX ADMIN — PROPOFOL 150 MG: 10 INJECTION, EMULSION INTRAVENOUS at 08:05

## 2018-10-04 RX ADMIN — KETOROLAC TROMETHAMINE 30 MG: 30 INJECTION, SOLUTION INTRAMUSCULAR at 08:20

## 2018-10-04 RX ADMIN — Medication 10 MG: at 08:12

## 2018-10-04 RX ADMIN — SODIUM CHLORIDE, POTASSIUM CHLORIDE, SODIUM LACTATE AND CALCIUM CHLORIDE: 600; 310; 30; 20 INJECTION, SOLUTION INTRAVENOUS at 06:55

## 2018-10-04 ASSESSMENT — PULMONARY FUNCTION TESTS
PIF_VALUE: 18
PIF_VALUE: 21
PIF_VALUE: 22
PIF_VALUE: 1
PIF_VALUE: 15
PIF_VALUE: 2
PIF_VALUE: 11
PIF_VALUE: 21
PIF_VALUE: 0
PIF_VALUE: 22
PIF_VALUE: 21
PIF_VALUE: 11
PIF_VALUE: 0
PIF_VALUE: 11
PIF_VALUE: 19
PIF_VALUE: 4
PIF_VALUE: 4
PIF_VALUE: 2
PIF_VALUE: 21
PIF_VALUE: 22
PIF_VALUE: 22
PIF_VALUE: 5
PIF_VALUE: 21
PIF_VALUE: 21
PIF_VALUE: 22
PIF_VALUE: 22
PIF_VALUE: 21
PIF_VALUE: 21
PIF_VALUE: 15
PIF_VALUE: 20
PIF_VALUE: 11
PIF_VALUE: 22
PIF_VALUE: 11
PIF_VALUE: 19

## 2018-10-04 ASSESSMENT — PAIN - FUNCTIONAL ASSESSMENT: PAIN_FUNCTIONAL_ASSESSMENT: 0-10

## 2018-10-04 ASSESSMENT — PAIN SCALES - GENERAL
PAINLEVEL_OUTOF10: 5
PAINLEVEL_OUTOF10: 5
PAINLEVEL_OUTOF10: 3
PAINLEVEL_OUTOF10: 0
PAINLEVEL_OUTOF10: 3
PAINLEVEL_OUTOF10: 0

## 2018-10-04 ASSESSMENT — PAIN DESCRIPTION - ORIENTATION: ORIENTATION: RIGHT

## 2018-10-04 ASSESSMENT — PAIN DESCRIPTION - LOCATION
LOCATION: KNEE

## 2018-10-04 ASSESSMENT — PAIN DESCRIPTION - DESCRIPTORS
DESCRIPTORS: BURNING
DESCRIPTORS: ACHING;BURNING

## 2018-10-04 ASSESSMENT — ENCOUNTER SYMPTOMS: SHORTNESS OF BREATH: 1

## 2018-10-04 ASSESSMENT — PAIN DESCRIPTION - PAIN TYPE
TYPE: SURGICAL PAIN
TYPE: SURGICAL PAIN

## 2018-10-04 NOTE — ANESTHESIA PRE PROCEDURE
Medications   Medication Dose Route Frequency Provider Last Rate Last Dose    lactated ringers infusion   Intravenous Continuous Abraham Alberto  mL/hr at 10/04/18 0655      lidocaine PF 1 % injection 1 mL  1 mL Intradermal Once PRN Abraham Alberto MD        sodium chloride flush 0.9 % injection 10 mL  10 mL Intravenous 2 times per day Abraham Alberto MD        sodium chloride flush 0.9 % injection 10 mL  10 mL Intravenous PRN Abraham Alberto MD           Allergies:     Allergies   Allergen Reactions    Ciprofloxacin     Codeine     Lisinopril     Morphine     Norvasc [Amlodipine Besylate]     Penicillins     Sanctura [Trospium] Nausea Only     Bloated, nauseated    Xarelto [Rivaroxaban]        Problem List:    Patient Active Problem List   Diagnosis Code    Asthma J45.909    Cervical disc disease M50.90    Esophageal reflux K21.9    Fatigue R53.83    Hip joint stiffness M25.659    Hyperlipidemia E78.5    Hypertension I10    Hypokalemia E87.6    Joint pain, hip M25.559    Lower back pain M54.5    Muscle spasm M62.838    Nasal congestion R09.81    Neck pain M54.2    Osteoarthritis of hip M16.9    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    Sacroiliac strain S39.012A    Scoliosis M41.9    Shortness of breath R06.02    Snoring R06.83    Tachycardia R00.0    Urinary incontinence R32    Weight gain R63.5    Acute internal derangement of right knee M23.91       Past Medical History:        Diagnosis Date    Arthritis     Asthma     \"winter\" asthma    Bronchitis     Constipation     GERD (gastroesophageal reflux disease)     Hyperlipidemia     Hypertension     Hypokalemia     Overactive bladder        Past Surgical History:        Procedure Laterality Date    BACK SURGERY  1983 and 1992    neck and lower back    BLADDER SURGERY  2006, 2008    2 surgeries: sling surgery first then another sling and tack bladder up surgery    CATARACT REMOVAL WITH IMPLANT Bilateral     COLONOSCOPY  2015    had

## 2018-10-04 NOTE — ANESTHESIA POSTPROCEDURE EVALUATION
POST- ANESTHESIA EVALUATION       Pt Name: Tommy Vick  MRN: [id number]  YOB: 1950  Date of evaluation: 10/4/2018  Time:  11:18 AM      /61   Pulse 78   Temp 98 °F (36.7 °C)   Resp 20   Ht 5' 4\" (1.626 m)   Wt 214 lb (97.1 kg)   SpO2 94%   BMI 36.73 kg/m²      Consciousness Level  Awake  Cardiopulmonary Status  Stable  Pain Adequately Treated YES  Nausea / Vomiting  NO  Adequate Hydration  YES  Anesthesia Related Complications NONE      Electronically signed by Tonia Steele MD on 10/4/2018 at 11:18 AM       Department of Anesthesiology  Postprocedure Note    Patient: Tommy Vick  MRN: [id number]  YOB: 1950  Date of evaluation: 10/4/2018  Time:  11:18 AM     Procedure Summary     Date:  10/04/18 Room / Location:  [address] [place] [address] [place]  [address] / [address] [facility]    Anesthesia Start:  0800 Anesthesia Stop:  0840    Procedure:  KNEE ARTHROSCOPY FOR PARTIAL, MEDIAL MENISCECTOMY. (Right Knee) Diagnosis:  (MEDIAL MENISCUS TEAR RIGHT KNEE )    Surgeon:  Cammie Gonzalez MD Responsible Provider:  Tonia Steele MD    Anesthesia Type:  general ASA Status:  2          Anesthesia Type: general    Kevin Phase I: Kevin Score: 10    Kevin Phase II: Kevin Score: 10    Last vitals: Reviewed and per EMR flowsheets.        Anesthesia Post Evaluation

## 2018-10-04 NOTE — H&P
granddaughter, states leg got trapped behind her, twisting of leg. Patient reports immediate pain, swelling at time of injury. Pain has persisted, described as ache (8/10), medial side of knee without radiation. Pain is worse with all types of activity, walking/running, worse with impact. Pain improved with rest, has taken mediation and physical therapy without much relief. Patient admits to instability in the knee, no falls. Patient otherwise feels well, no recent fever/chills, chest pain, shortness of breath. No history of venous thrombosis.      PAST MEDICAL HISTORY      Past Medical History        Past Medical History:   Diagnosis Date    Arthritis      Asthma       \"winter\" asthma    Bronchitis      Constipation      GERD (gastroesophageal reflux disease)      Hyperlipidemia      Hypertension      Hypokalemia      Overactive bladder           Pt denies any history of:  Diabetes Mellitus, Coronary Artery Disease, CVA/TIA, COPD, Thyroid disease, Kidney Disease, Cancer, Seizures     SURGICAL HISTORY        Past Surgical History         Past Surgical History:   Procedure Laterality Date    BACK SURGERY   1983 and 1992     neck and lower back    BLADDER SURGERY   2006, 2008     2 surgeries: sling surgery first then another sling and tack bladder up surgery    CATARACT REMOVAL WITH IMPLANT Bilateral      COLONOSCOPY   2015     had polyp, done at [facility]. Dr Alton Guerrero COLONOSCOPY N/A 8/21/2018     COLONOSCOPY POLYPECTOMY /COLD BX  performed by Anand Dawn MD at [address] [place] [address]   02/2017     upper rt back.     TOTAL HIP ARTHROPLASTY Bilateral 2013, 2015     Dr Mika Basurto  Family History         Family History   Problem Relation Age of Onset    Hypertension Mother      Fainting Mother      Hypertension Sister      Heart Disease Maternal Grandmother           SOCIAL HISTORY        Social History   Social History            Social History    palpable mass or  organomegaly. LYMPHATICS:  No palpable cervical lymphadenopathy.      LOCOMOTOR, BACK AND SPINE:  No tenderness or deformities. No flank tenderness. EXTREMITIES:  Symmetrical with no pretibial/pedal edema. No discoloration or ulcerations. No warmth, tenderness, erythema noted in lower legs bilaterally. Mild tenderness to palpation along medial joint line. No effusion. Equivocal [facility]'s medially. No significant crepitus, range of motion without significant deficit.      NEUROLOGIC:  The patient is conscious, alert, oriented. No apparent focal sensory or motor deficits. Cranial nerve exam reveals no deficits.                                                                        PROVISIONAL DIAGNOSES / SURGERY:       Medial Meniscus Tear, Right Knee  Arthroscopy, Right Knee          Patient Active Problem List     Diagnosis Date Noted    Acute internal derangement of right knee 08/06/2018    Asthma 11/06/2015    Cervical disc disease 11/06/2015    Esophageal reflux 11/06/2015    Fatigue 11/06/2015    Hip joint stiffness 11/06/2015    Hyperlipidemia 11/06/2015    Hypertension 11/06/2015    Hypokalemia 11/06/2015    Joint pain, hip 11/06/2015    Lower back pain 11/06/2015    Muscle spasm 11/06/2015    Nasal congestion 11/06/2015    Neck pain 11/06/2015    Osteoarthritis of hip 11/06/2015    Osteoarthritis of neck 11/06/2015    Sacroiliac strain 11/06/2015    Scoliosis 11/06/2015    Shortness of breath 11/06/2015    Snoring 11/06/2015    Tachycardia 11/06/2015    Urinary incontinence 11/06/2015    Weight gain 11/06/2015            Mishel Renee PA-C on 9/25/2018 at 2:07 PM         Cosigned by: Osiel Pleitez MD at 9/26/2018  8:00 AM     Mishel Renee PA-C Physician Assistant Signed Internal Medicine  H&P Date of Service: 9/25/2018  7:56 AM   Related encounter: Pre-Admission Testing Visit from 9/25/2018 in [facility]. 11/06/2015    Scoliosis 11/06/2015    Shortness of breath 11/06/2015    Snoring 11/06/2015    Tachycardia 11/06/2015    Urinary incontinence 11/06/2015    Weight gain 11/06/2015            Abbe Sloan PA-C on 9/25/2018 at 2:07 PM         Cosigned by: Nati Nobles MD at 9/26/2018  8:00 AM     Abbe Sloan PA-C Physician Assistant Signed Internal Medicine  H&P Date of Service: 9/25/2018  7:56 AM   Related encounter: Pre-Admission Testing Visit from 9/25/2018 in [address] [facility]. All Collapse All    []Manual[]Template  []Copied        HISTORY and [id number]         NAME:  Amirah Olivo  MRN: [id number]   YOB: 1950   Date: 9/25/2018   Age: 76 y.o. Gender: female      COMPLAINT AND PRESENT HISTORY:      Amirah Olivo is a 76 y.o.  female, undergoing preadmission testing for right knee arthroscopy with meniscus repair. Patient reports history of injury to the right knee approximately two months ago. Patient was going down a slide with granddaughter, states leg got trapped behind her, twisting of leg. Patient reports immediate pain, swelling at time of injury. Pain has persisted, described as ache (8/10), medial side of knee without radiation. Pain is worse with all types of activity, walking/running, worse with impact. Pain improved with rest, has taken mediation and physical therapy without much relief. Patient admits to instability in the knee, no falls. Patient otherwise feels well, no recent fever/chills, chest pain, shortness of breath.  No history of venous thrombosis.      PAST MEDICAL HISTORY      Past Medical History        Past Medical History:   Diagnosis Date    Arthritis      Asthma       \"winter\" asthma    Bronchitis      Constipation      GERD (gastroesophageal reflux disease)      Hyperlipidemia      Hypertension      Hypokalemia      Overactive bladder           Pt denies any history of:  Diabetes Mellitus, Coronary Artery Disease, CVA/TIA, COPD, Thyroid disease, Kidney Disease, Cancer, Seizures     SURGICAL HISTORY        Past Surgical History         Past Surgical History:   Procedure Laterality Date    BACK SURGERY   1983 and 1992     neck and lower back    BLADDER SURGERY   2006, 2008     2 surgeries: sling surgery first then another sling and tack bladder up surgery    CATARACT REMOVAL WITH IMPLANT Bilateral      COLONOSCOPY   2015     had polyp, [facility] at [facility]. Dr Anitra Pratt COLONOSCOPY N/A 8/21/2018     COLONOSCOPY POLYPECTOMY /COLD BX  performed by Swati Lara MD at [address] [place] [address]   02/2017     upper rt back.     TOTAL HIP ARTHROPLASTY Bilateral 2013, 2015     Dr Sally Bourne  Family History         Family History   Problem Relation Age of Onset    Hypertension Mother      Fainting Mother      Hypertension Sister      Heart Disease Maternal Grandmother           SOCIAL HISTORY        Social History   Social History            Social History    Marital status:        Spouse name: N/A    Number of children: N/A    Years of education: N/A             Social History Main Topics    Smoking status: Never Smoker    Smokeless tobacco: Never Used    Alcohol use 0.0 oz/week       1 Glasses of wine, 1 Cans of beer, 1 Shots of liquor per week         Comment: 3 times a week    Drug use: No    Sexual activity: Not Asked           Other Topics Concern    None          Social History Narrative    None         REVIEW OF SYSTEMS             Allergies   Allergen Reactions    Ciprofloxacin      Codeine      Lisinopril      Morphine      Norvasc [Amlodipine Besylate]      Penicillins      Sanctura [Trospium] Nausea Only       Bloated, nauseated    Xarelto [Rivaroxaban]               Current Outpatient Prescriptions on File Prior to Encounter   Medication Sig Dispense Refill    cloNIDine (CATAPRES) 0.3 MG tablet TAKE ONE TABLET BY MOUTH 4\" (1.626 m)   Wt 214 lb (97.1 kg)   SpO2 96%   BMI 36.73 kg/m²  Body mass index is 36.73 kg/m².      GENERAL APPEARANCE: Derrick Davis is a 76 y.o.  female, moderately obese, nourished, conscious, alert. Does not appear to be in any distress or pain at this time. SKIN:  Normal temperature, turgor and texture. No cyanosis or jaundice. HEAD:  Normocephalic, atraumatic. EYES:  Pupils equal, reactive to light and accomodation. Conjunctiva clear. EOMs intact bilaterally. THROAT:  Mucous membranes moist. No tonsillar erythema or exudates. NECK:  No stiffness, trachea central.  No palpable masses. CHEST:  Symmetrical and equal on expansion. HEART:  Normotensive. Regular rate, rhythm. No murmur. LUNGS:  Equal on expansion. Clear to auscultation with no adventitious sounds. ABDOMEN:  Obese. Soft on palpation. No localized tenderness, guarding or rigidity. No palpable mass or  organomegaly. LYMPHATICS:  No palpable cervical lymphadenopathy.      LOCOMOTOR, BACK AND SPINE:  No tenderness or deformities. No flank tenderness. EXTREMITIES:  Symmetrical with no pretibial/pedal edema. No discoloration or ulcerations. No warmth, tenderness, erythema noted in lower legs bilaterally. Mild tenderness to palpation along medial joint line. No effusion. Equivocal [facility]'s medially. No significant crepitus, range of motion without significant deficit.      NEUROLOGIC:  The patient is conscious, alert, oriented. No apparent focal sensory or motor deficits. Cranial nerve exam reveals no deficits.                                                                        PROVISIONAL DIAGNOSES / SURGERY:       Medial Meniscus Tear, Right Knee  Arthroscopy, Right Knee          Patient Active Problem List     Diagnosis Date Noted    Acute internal derangement of right knee 08/06/2018    Asthma 11/06/2015    Cervical disc disease 11/06/2015    Esophageal reflux 11/06/2015    Fatigue 11/06/2015    Hip joint stiffness 11/06/2015    Hyperlipidemia 11/06/2015    Hypertension 11/06/2015    Hypokalemia 11/06/2015    Joint pain, hip 11/06/2015    Lower back pain 11/06/2015    Muscle spasm 11/06/2015    Nasal congestion 11/06/2015    Neck pain 11/06/2015    Osteoarthritis of hip 11/06/2015    Osteoarthritis of neck 11/06/2015    Sacroiliac strain 11/06/2015    Scoliosis 11/06/2015    Shortness of breath 11/06/2015    Snoring 11/06/2015    Tachycardia 11/06/2015    Urinary incontinence 11/06/2015    Weight gain 11/06/2015            Frida Moreland PA-C on 9/25/2018 at 2:07 PM         Cosigned by: Nell Ramirez MD at 9/26/2018  8:00 AM

## 2018-10-04 NOTE — OP NOTE
Operative dictation  Preoperative diagnosis: Medial meniscus tear right knee  Postoperative diagnosis: Same  Procedure: Arthroscopic examination right knee with partial medial meniscectomy    Surgeon: Jackson Minaya  Assistant: Sima Jackson  Anesthesia: Gen. Patient is a 44-year-old patient who presented my office with a history catching locking sensation the medial aspect of her right knee. Examination was consistent with a medial meniscus tear. Patient had an MRI performed at the [facility] which showed type II to type III degenerative change within the posterior horn medial meniscus indicating likely instability. Examination was consistent with this. Having failed conservative treatments felt the patient benefit from arthroscopic evaluation treatment and consent was obtained good comprehension of hours benefits    Patient was taken operating room had successful induction general anesthesia. In terms by the right thigh. She was placed in a leg can. Leg was exsanguinated and tourniquet inflated to 300 mmHg. Leg was prepped and draped in usual fashion medial and lateral portals are established and scope was inserted. The patellofemoral joint was unremarkable other than some grade 3 chondromalacia which is age specific scope was then passed on the medial gutter into medial compartment where the grade 2 chondral malacia was noted on the femoral and tibial surfaces. The posterior horn of the medial meniscus was probed and found to be unstable and the undersurface and brought out into the joint. Partial medial meniscectomy is carried out with straight basket forceps down to stable rim and then smooth out 4.0 tomcat reprobing found to be stable. The remainder of the meniscus was stable. The ACL was assessed probably intact. Laterally there the patient has some fibrillation of the inner portion of the meniscus was was debrided but the meniscus was stable upon thorough probing.   Patient also had grade 2, lesion

## 2018-10-15 ENCOUNTER — OFFICE VISIT (OUTPATIENT)
Dept: ORTHOPEDIC SURGERY | Age: 68
End: 2018-10-15

## 2018-10-15 DIAGNOSIS — Z98.890 S/P RIGHT KNEE ARTHROSCOPY: Primary | ICD-10-CM

## 2018-10-15 PROCEDURE — 99024 POSTOP FOLLOW-UP VISIT: CPT | Performed by: ORTHOPAEDIC SURGERY

## 2018-11-29 LAB
BASOPHILS ABSOLUTE: NORMAL /ΜL
BASOPHILS RELATIVE PERCENT: NORMAL %
EOSINOPHILS ABSOLUTE: NORMAL /ΜL
EOSINOPHILS RELATIVE PERCENT: NORMAL %
HCT VFR BLD CALC: NORMAL % (ref 36–46)
HEMOGLOBIN: NORMAL G/DL (ref 12–16)
INR BLD: NORMAL
LYMPHOCYTES ABSOLUTE: NORMAL /ΜL
LYMPHOCYTES RELATIVE PERCENT: NORMAL %
MCH RBC QN AUTO: NORMAL PG
MCHC RBC AUTO-ENTMCNC: NORMAL G/DL
MCV RBC AUTO: NORMAL FL
MONOCYTES ABSOLUTE: NORMAL /ΜL
MONOCYTES RELATIVE PERCENT: NORMAL %
NEUTROPHILS ABSOLUTE: NORMAL /ΜL
NEUTROPHILS RELATIVE PERCENT: NORMAL %
PDW BLD-RTO: NORMAL %
PLATELET # BLD: NORMAL K/ΜL
PMV BLD AUTO: NORMAL FL
PROTIME: NORMAL SECONDS
RBC # BLD: NORMAL 10^6/ΜL
WBC # BLD: NORMAL 10^3/ML

## 2018-12-06 DIAGNOSIS — R04.0 EPISTAXIS: ICD-10-CM

## 2018-12-06 DIAGNOSIS — Z11.59 ENCOUNTER FOR HEPATITIS C SCREENING TEST FOR LOW RISK PATIENT: ICD-10-CM

## 2019-01-27 ENCOUNTER — TELEPHONE (OUTPATIENT)
Dept: PRIMARY CARE CLINIC | Age: 69
End: 2019-01-27

## 2019-02-11 ENCOUNTER — OFFICE VISIT (OUTPATIENT)
Dept: ORTHOPEDIC SURGERY | Age: 69
End: 2019-02-11
Payer: MEDICARE

## 2019-02-11 DIAGNOSIS — G89.29 CHRONIC PAIN OF RIGHT KNEE: Primary | ICD-10-CM

## 2019-02-11 DIAGNOSIS — Z98.890 H/O ARTHROSCOPY OF RIGHT KNEE: ICD-10-CM

## 2019-02-11 DIAGNOSIS — M25.561 CHRONIC PAIN OF RIGHT KNEE: Primary | ICD-10-CM

## 2019-02-11 PROCEDURE — G8482 FLU IMMUNIZE ORDER/ADMIN: HCPCS | Performed by: ORTHOPAEDIC SURGERY

## 2019-02-11 PROCEDURE — G8399 PT W/DXA RESULTS DOCUMENT: HCPCS | Performed by: ORTHOPAEDIC SURGERY

## 2019-02-11 PROCEDURE — 3017F COLORECTAL CA SCREEN DOC REV: CPT | Performed by: ORTHOPAEDIC SURGERY

## 2019-02-11 PROCEDURE — G8428 CUR MEDS NOT DOCUMENT: HCPCS | Performed by: ORTHOPAEDIC SURGERY

## 2019-02-11 PROCEDURE — 1090F PRES/ABSN URINE INCON ASSESS: CPT | Performed by: ORTHOPAEDIC SURGERY

## 2019-02-11 PROCEDURE — G8417 CALC BMI ABV UP PARAM F/U: HCPCS | Performed by: ORTHOPAEDIC SURGERY

## 2019-02-11 PROCEDURE — 4040F PNEUMOC VAC/ADMIN/RCVD: CPT | Performed by: ORTHOPAEDIC SURGERY

## 2019-02-11 PROCEDURE — 3014F SCREEN MAMMO DOC REV: CPT | Performed by: ORTHOPAEDIC SURGERY

## 2019-02-11 PROCEDURE — 1101F PT FALLS ASSESS-DOCD LE1/YR: CPT | Performed by: ORTHOPAEDIC SURGERY

## 2019-02-11 PROCEDURE — 1036F TOBACCO NON-USER: CPT | Performed by: ORTHOPAEDIC SURGERY

## 2019-02-11 PROCEDURE — 1123F ACP DISCUSS/DSCN MKR DOCD: CPT | Performed by: ORTHOPAEDIC SURGERY

## 2019-02-11 PROCEDURE — 20610 DRAIN/INJ JOINT/BURSA W/O US: CPT | Performed by: ORTHOPAEDIC SURGERY

## 2019-02-11 PROCEDURE — 99213 OFFICE O/P EST LOW 20 MIN: CPT | Performed by: ORTHOPAEDIC SURGERY

## 2019-02-11 RX ORDER — LIDOCAINE HYDROCHLORIDE 10 MG/ML
2 INJECTION, SOLUTION EPIDURAL; INFILTRATION; INTRACAUDAL; PERINEURAL ONCE
Status: COMPLETED | OUTPATIENT
Start: 2019-02-11 | End: 2019-02-11

## 2019-02-11 RX ORDER — BUPIVACAINE HYDROCHLORIDE 5 MG/ML
2 INJECTION, SOLUTION PERINEURAL ONCE
Status: COMPLETED | OUTPATIENT
Start: 2019-02-11 | End: 2019-02-11

## 2019-02-11 RX ORDER — BETAMETHASONE SODIUM PHOSPHATE AND BETAMETHASONE ACETATE 3; 3 MG/ML; MG/ML
12 INJECTION, SUSPENSION INTRA-ARTICULAR; INTRALESIONAL; INTRAMUSCULAR; SOFT TISSUE ONCE
Status: COMPLETED | OUTPATIENT
Start: 2019-02-11 | End: 2019-02-11

## 2019-02-11 RX ADMIN — LIDOCAINE HYDROCHLORIDE 2 ML: 10 INJECTION, SOLUTION EPIDURAL; INFILTRATION; INTRACAUDAL; PERINEURAL at 16:14

## 2019-02-11 RX ADMIN — BUPIVACAINE HYDROCHLORIDE 10 MG: 5 INJECTION, SOLUTION PERINEURAL at 16:12

## 2019-02-11 RX ADMIN — BETAMETHASONE SODIUM PHOSPHATE AND BETAMETHASONE ACETATE 12 MG: 3; 3 INJECTION, SUSPENSION INTRA-ARTICULAR; INTRALESIONAL; INTRAMUSCULAR; SOFT TISSUE at 16:11

## 2019-03-07 ENCOUNTER — OFFICE VISIT (OUTPATIENT)
Dept: PRIMARY CARE CLINIC | Age: 69
End: 2019-03-07
Payer: MEDICARE

## 2019-03-07 VITALS
WEIGHT: 200.6 LBS | DIASTOLIC BLOOD PRESSURE: 82 MMHG | SYSTOLIC BLOOD PRESSURE: 120 MMHG | OXYGEN SATURATION: 96 % | BODY MASS INDEX: 34.7 KG/M2 | HEART RATE: 79 BPM

## 2019-03-07 DIAGNOSIS — E78.5 HYPERLIPIDEMIA, UNSPECIFIED HYPERLIPIDEMIA TYPE: ICD-10-CM

## 2019-03-07 DIAGNOSIS — R51.9 RECURRENT HEADACHE: ICD-10-CM

## 2019-03-07 DIAGNOSIS — I10 ESSENTIAL HYPERTENSION: Primary | ICD-10-CM

## 2019-03-07 PROCEDURE — G8417 CALC BMI ABV UP PARAM F/U: HCPCS | Performed by: FAMILY MEDICINE

## 2019-03-07 PROCEDURE — 4040F PNEUMOC VAC/ADMIN/RCVD: CPT | Performed by: FAMILY MEDICINE

## 2019-03-07 PROCEDURE — 1036F TOBACCO NON-USER: CPT | Performed by: FAMILY MEDICINE

## 2019-03-07 PROCEDURE — G8427 DOCREV CUR MEDS BY ELIG CLIN: HCPCS | Performed by: FAMILY MEDICINE

## 2019-03-07 PROCEDURE — 99214 OFFICE O/P EST MOD 30 MIN: CPT | Performed by: FAMILY MEDICINE

## 2019-03-07 PROCEDURE — G8482 FLU IMMUNIZE ORDER/ADMIN: HCPCS | Performed by: FAMILY MEDICINE

## 2019-03-07 PROCEDURE — 1090F PRES/ABSN URINE INCON ASSESS: CPT | Performed by: FAMILY MEDICINE

## 2019-03-07 PROCEDURE — G8399 PT W/DXA RESULTS DOCUMENT: HCPCS | Performed by: FAMILY MEDICINE

## 2019-03-07 PROCEDURE — 3017F COLORECTAL CA SCREEN DOC REV: CPT | Performed by: FAMILY MEDICINE

## 2019-03-07 PROCEDURE — 1123F ACP DISCUSS/DSCN MKR DOCD: CPT | Performed by: FAMILY MEDICINE

## 2019-03-07 PROCEDURE — 1101F PT FALLS ASSESS-DOCD LE1/YR: CPT | Performed by: FAMILY MEDICINE

## 2019-03-07 PROCEDURE — 3014F SCREEN MAMMO DOC REV: CPT | Performed by: FAMILY MEDICINE

## 2019-03-07 ASSESSMENT — ENCOUNTER SYMPTOMS: SHORTNESS OF BREATH: 0

## 2019-05-17 RX ORDER — SIMVASTATIN 20 MG
TABLET ORAL
Qty: 90 TABLET | Refills: 1 | Status: SHIPPED | OUTPATIENT
Start: 2019-05-17 | End: 2019-12-06 | Stop reason: SDUPTHER

## 2019-06-29 DIAGNOSIS — I10 ESSENTIAL HYPERTENSION: ICD-10-CM

## 2019-07-01 RX ORDER — SPIRONOLACTONE 50 MG/1
TABLET, FILM COATED ORAL
Qty: 90 TABLET | Refills: 2 | Status: SHIPPED | OUTPATIENT
Start: 2019-07-01 | End: 2020-03-31 | Stop reason: SDUPTHER

## 2019-07-18 ENCOUNTER — OFFICE VISIT (OUTPATIENT)
Dept: PRIMARY CARE CLINIC | Age: 69
End: 2019-07-18
Payer: MEDICARE

## 2019-07-18 VITALS
WEIGHT: 205.6 LBS | DIASTOLIC BLOOD PRESSURE: 86 MMHG | OXYGEN SATURATION: 96 % | HEART RATE: 75 BPM | SYSTOLIC BLOOD PRESSURE: 114 MMHG | BODY MASS INDEX: 35.57 KG/M2

## 2019-07-18 DIAGNOSIS — Z12.39 BREAST CANCER SCREENING: ICD-10-CM

## 2019-07-18 DIAGNOSIS — M65.311 TRIGGER THUMB OF RIGHT HAND: ICD-10-CM

## 2019-07-18 DIAGNOSIS — I10 ESSENTIAL HYPERTENSION: Primary | ICD-10-CM

## 2019-07-18 DIAGNOSIS — R05.3 CHRONIC COUGH: ICD-10-CM

## 2019-07-18 DIAGNOSIS — E78.5 HYPERLIPIDEMIA, UNSPECIFIED HYPERLIPIDEMIA TYPE: ICD-10-CM

## 2019-07-18 PROCEDURE — G8427 DOCREV CUR MEDS BY ELIG CLIN: HCPCS | Performed by: FAMILY MEDICINE

## 2019-07-18 PROCEDURE — 3014F SCREEN MAMMO DOC REV: CPT | Performed by: FAMILY MEDICINE

## 2019-07-18 PROCEDURE — G8399 PT W/DXA RESULTS DOCUMENT: HCPCS | Performed by: FAMILY MEDICINE

## 2019-07-18 PROCEDURE — 99214 OFFICE O/P EST MOD 30 MIN: CPT | Performed by: FAMILY MEDICINE

## 2019-07-18 PROCEDURE — 1123F ACP DISCUSS/DSCN MKR DOCD: CPT | Performed by: FAMILY MEDICINE

## 2019-07-18 PROCEDURE — 4040F PNEUMOC VAC/ADMIN/RCVD: CPT | Performed by: FAMILY MEDICINE

## 2019-07-18 PROCEDURE — 1036F TOBACCO NON-USER: CPT | Performed by: FAMILY MEDICINE

## 2019-07-18 PROCEDURE — G8417 CALC BMI ABV UP PARAM F/U: HCPCS | Performed by: FAMILY MEDICINE

## 2019-07-18 PROCEDURE — 3017F COLORECTAL CA SCREEN DOC REV: CPT | Performed by: FAMILY MEDICINE

## 2019-07-18 PROCEDURE — 1090F PRES/ABSN URINE INCON ASSESS: CPT | Performed by: FAMILY MEDICINE

## 2019-07-18 RX ORDER — CLONIDINE HYDROCHLORIDE 0.3 MG/1
0.3 TABLET ORAL NIGHTLY
Qty: 90 TABLET | Refills: 2
Start: 2019-07-18 | End: 2019-08-27 | Stop reason: SDUPTHER

## 2019-07-18 ASSESSMENT — PATIENT HEALTH QUESTIONNAIRE - PHQ9
2. FEELING DOWN, DEPRESSED OR HOPELESS: 0
SUM OF ALL RESPONSES TO PHQ QUESTIONS 1-9: 0
SUM OF ALL RESPONSES TO PHQ9 QUESTIONS 1 & 2: 0
1. LITTLE INTEREST OR PLEASURE IN DOING THINGS: 0
SUM OF ALL RESPONSES TO PHQ QUESTIONS 1-9: 0

## 2019-07-18 ASSESSMENT — ENCOUNTER SYMPTOMS
COUGH: 1
SHORTNESS OF BREATH: 0

## 2019-07-18 NOTE — PROGRESS NOTES
Kan Valdivia a 71 y.o. female who presents today for her medical conditions/complaints as notedbelow. Chief Complaint   Patient presents with    Hypertension    Cough         HPI:   Hypertension   This is a chronic problem. The current episode started more than 1 year ago. The problem is unchanged. The problem is controlled. Pertinent negatives include no chest pain, palpitations or shortness of breath. There are no associated agents to hypertension. Risk factors for coronary artery disease include obesity, post-menopausal state and sedentary lifestyle. Past treatments include diuretics and central alpha agonists. Cough   Pertinent negatives include no chest pain or shortness of breath. Wedge pillow seemed to help her GERD and she cut back on her PPI med  Sees Dr Wilman Barone in past.   Increased her PPI back to 40 mg 2 weeks and still coughing at night and sometimes during the day. Is doing better. LDL Calculated (mg/dL)   Date Value   08/01/2018 75   06/26/2017 70       (goal LDL is <100)   BUN (mg/dL)   Date Value   09/25/2018 17     BP Readings from Last 3 Encounters:   07/18/19 114/86   03/07/19 120/82   11/28/18 (!) 136/92          (goal 120/80)    Past Medical History:   Diagnosis Date    Arthritis     Asthma     \"winter\" asthma    Bronchitis     Constipation     GERD (gastroesophageal reflux disease)     Hyperlipidemia     Hypertension     Hypokalemia     Overactive bladder       Past Surgical History:   Procedure Laterality Date    BACK SURGERY  1983 and 1992    neck and lower back    BLADDER SURGERY  2006, 2008    2 surgeries: sling surgery first then another sling and tack bladder up surgery    CATARACT REMOVAL WITH IMPLANT Bilateral     COLONOSCOPY  2015    had polyp, done at [facility]. Dr Franci Dennis 8/21/2018    COLONOSCOPY POLYPECTOMY /COLD BX  performed by Lucille Ryan MD at [address] [place] [address]  02/2017    upper rt back.     [place] KNEE

## 2019-07-18 NOTE — PATIENT INSTRUCTIONS
notice more mucus or a change in the color of your mucus.     · You have new symptoms, such as a sore throat, an earache, or sinus pain.     · You do not get better as expected. Where can you learn more? Go to [email or web address] and sign in to your [employer] account. Enter D279 in the [employer] box to learn more about \"Cough: Care Instructions. \"     If you do not have an account, please click on the \"Sign Up Now\" link. Current as of: September 5, 2018  Content Version: 12.0  © 2572-4537 Healthwise, Incorporated. Care instructions adapted under license by [facility] ([facility]). If you have questions about a medical condition or this instruction, always ask your healthcare professional. [address] any warranty or liability for your use of this information.

## 2019-08-27 DIAGNOSIS — I10 ESSENTIAL HYPERTENSION: ICD-10-CM

## 2019-08-27 RX ORDER — CLONIDINE HYDROCHLORIDE 0.3 MG/1
TABLET ORAL
Qty: 90 TABLET | Refills: 2 | Status: SHIPPED | OUTPATIENT
Start: 2019-08-27 | End: 2020-03-31 | Stop reason: SDUPTHER

## 2019-10-21 ENCOUNTER — NURSE ONLY (OUTPATIENT)
Dept: PRIMARY CARE CLINIC | Age: 69
End: 2019-10-21
Payer: MEDICARE

## 2019-10-21 DIAGNOSIS — Z23 NEED FOR IMMUNIZATION AGAINST INFLUENZA: Primary | ICD-10-CM

## 2019-10-21 PROCEDURE — G0008 ADMIN INFLUENZA VIRUS VAC: HCPCS | Performed by: FAMILY MEDICINE

## 2019-10-21 PROCEDURE — 90653 IIV ADJUVANT VACCINE IM: CPT | Performed by: FAMILY MEDICINE

## 2019-12-06 RX ORDER — SIMVASTATIN 20 MG
20 TABLET ORAL DAILY
Qty: 90 TABLET | Refills: 1 | Status: SHIPPED | OUTPATIENT
Start: 2019-12-06 | End: 2020-03-26

## 2019-12-09 ENCOUNTER — HOSPITAL ENCOUNTER (OUTPATIENT)
Age: 69
Setting detail: SPECIMEN
Discharge: HOME | End: 2019-12-09
Payer: MEDICARE

## 2019-12-09 DIAGNOSIS — E78.5 HYPERLIPIDEMIA, UNSPECIFIED HYPERLIPIDEMIA TYPE: ICD-10-CM

## 2019-12-09 DIAGNOSIS — I10 ESSENTIAL HYPERTENSION: ICD-10-CM

## 2019-12-09 LAB
ALBUMIN SERPL-MCNC: 4.2 G/DL (ref 3.5–5.2)
ALBUMIN/GLOBULIN RATIO: 1.3 (ref 1–2.5)
ALP BLD-CCNC: 92 U/L (ref 35–104)
ALT SERPL-CCNC: 17 U/L (ref 5–33)
ANION GAP SERPL CALCULATED.3IONS-SCNC: 11 MMOL/L (ref 9–17)
AST SERPL-CCNC: 19 U/L
BILIRUB SERPL-MCNC: 0.62 MG/DL (ref 0.3–1.2)
BUN BLDV-MCNC: 16 MG/DL (ref 8–23)
BUN/CREAT BLD: ABNORMAL (ref 9–20)
CALCIUM SERPL-MCNC: 9.5 MG/DL (ref 8.6–10.4)
CHLORIDE BLD-SCNC: 107 MMOL/L (ref 98–107)
CHOLESTEROL/HDL RATIO: 3.2
CHOLESTEROL: 209 MG/DL
CO2: 26 MMOL/L (ref 20–31)
CREAT SERPL-MCNC: 1.12 MG/DL (ref 0.5–0.9)
GFR AFRICAN AMERICAN: 58 ML/MIN
GFR NON-AFRICAN AMERICAN: 48 ML/MIN
GFR SERPL CREATININE-BSD FRML MDRD: ABNORMAL ML/MIN/{1.73_M2}
GFR SERPL CREATININE-BSD FRML MDRD: ABNORMAL ML/MIN/{1.73_M2}
GLUCOSE FASTING: 92 MG/DL (ref 70–99)
HDLC SERPL-MCNC: 65 MG/DL
LDL CHOLESTEROL: 106 MG/DL (ref 0–130)
POTASSIUM SERPL-SCNC: 5 MMOL/L (ref 3.7–5.3)
SODIUM BLD-SCNC: 144 MMOL/L (ref 135–144)
TOTAL PROTEIN: 7.4 G/DL (ref 6.4–8.3)
TRIGL SERPL-MCNC: 190 MG/DL
VLDLC SERPL CALC-MCNC: ABNORMAL MG/DL (ref 1–30)

## 2020-02-25 ENCOUNTER — OFFICE VISIT (OUTPATIENT)
Dept: PRIMARY CARE CLINIC | Age: 70
End: 2020-02-25
Payer: MEDICARE

## 2020-02-25 VITALS
HEIGHT: 64 IN | BODY MASS INDEX: 35.55 KG/M2 | OXYGEN SATURATION: 96 % | SYSTOLIC BLOOD PRESSURE: 122 MMHG | HEART RATE: 68 BPM | WEIGHT: 208.2 LBS | DIASTOLIC BLOOD PRESSURE: 84 MMHG

## 2020-02-25 PROCEDURE — G8482 FLU IMMUNIZE ORDER/ADMIN: HCPCS | Performed by: FAMILY MEDICINE

## 2020-02-25 PROCEDURE — 99214 OFFICE O/P EST MOD 30 MIN: CPT | Performed by: FAMILY MEDICINE

## 2020-02-25 PROCEDURE — 4040F PNEUMOC VAC/ADMIN/RCVD: CPT | Performed by: FAMILY MEDICINE

## 2020-02-25 PROCEDURE — 1036F TOBACCO NON-USER: CPT | Performed by: FAMILY MEDICINE

## 2020-02-25 PROCEDURE — 3017F COLORECTAL CA SCREEN DOC REV: CPT | Performed by: FAMILY MEDICINE

## 2020-02-25 PROCEDURE — 1123F ACP DISCUSS/DSCN MKR DOCD: CPT | Performed by: FAMILY MEDICINE

## 2020-02-25 PROCEDURE — G8427 DOCREV CUR MEDS BY ELIG CLIN: HCPCS | Performed by: FAMILY MEDICINE

## 2020-02-25 PROCEDURE — 1090F PRES/ABSN URINE INCON ASSESS: CPT | Performed by: FAMILY MEDICINE

## 2020-02-25 PROCEDURE — G8417 CALC BMI ABV UP PARAM F/U: HCPCS | Performed by: FAMILY MEDICINE

## 2020-02-25 PROCEDURE — G8399 PT W/DXA RESULTS DOCUMENT: HCPCS | Performed by: FAMILY MEDICINE

## 2020-02-25 ASSESSMENT — PATIENT HEALTH QUESTIONNAIRE - PHQ9
SUM OF ALL RESPONSES TO PHQ QUESTIONS 1-9: 0
2. FEELING DOWN, DEPRESSED OR HOPELESS: 0
1. LITTLE INTEREST OR PLEASURE IN DOING THINGS: 0
SUM OF ALL RESPONSES TO PHQ QUESTIONS 1-9: 0
SUM OF ALL RESPONSES TO PHQ9 QUESTIONS 1 & 2: 0

## 2020-02-25 ASSESSMENT — ENCOUNTER SYMPTOMS: SHORTNESS OF BREATH: 0

## 2020-02-25 NOTE — PROGRESS NOTES
Zhane Young a 71 y.o. female who presents today for her medical conditions/complaints as notedbelow. Chief Complaint   Patient presents with    Hypertension         HPI:   Hypertension   This is a chronic problem. The problem is unchanged. The problem is controlled. Pertinent negatives include no chest pain, palpitations or shortness of breath. There are no associated agents to hypertension. Risk factors for coronary artery disease include post-menopausal state and dyslipidemia (walking 3 x a week). Past treatments include diuretics and direct vasodilators. The current treatment provides significant improvement. There are no compliance problems. Has bad smelling urine in am x 2 years. Not drinking as much water as she should    Hand pain and stiffness: tried CBD oil not much help. LDL Cholesterol (mg/dL)   Date Value   12/09/2019 106     LDL Calculated (mg/dL)   Date Value   08/01/2018 75   06/26/2017 70       (goal LDL is <100)   AST (U/L)   Date Value   12/09/2019 19     ALT (U/L)   Date Value   12/09/2019 17     BUN (mg/dL)   Date Value   12/09/2019 16     BP Readings from Last 3 Encounters:   02/25/20 122/84   07/18/19 114/86   03/07/19 120/82          (goal 120/80)    Past Medical History:   Diagnosis Date    Arthritis     Asthma     \"winter\" asthma    Bronchitis     Constipation     GERD (gastroesophageal reflux disease)     Hyperlipidemia     Hypertension     Hypokalemia     Overactive bladder       Past Surgical History:   Procedure Laterality Date    BACK SURGERY  1983 and 1992    neck and lower back    BLADDER SURGERY  2006, 2008    2 surgeries: sling surgery first then another sling and tack bladder up surgery    CATARACT REMOVAL WITH IMPLANT Bilateral     COLONOSCOPY  2015    had polyp, done at [facility]. Dr Arianna Renee 8/21/2018    COLONOSCOPY POLYPECTOMY /COLD BX  performed by Eleazar Centeno MD at [address] [place] [address]  02/2017    upper rt back.  CO KNEE SCOPE,DIAGNOSTIC Right 10/4/2018    KNEE ARTHROSCOPY FOR PARTIAL, MEDIAL MENISCECTOMY. performed by Amalia Thomas MD at [address] Bilateral 2013, 2015    Dr Calli Calloway History   Problem Relation Age of Onset    Hypertension Mother     Fainting Mother     Hypertension Sister     Heart Disease Maternal Grandmother        Social History     Tobacco Use    Smoking status: Never Smoker    Smokeless tobacco: Never Used   Substance Use Topics    Alcohol use: Yes     Alcohol/week: 0.0 standard drinks     Types: 1 Glasses of wine, 1 Cans of beer, 1 Shots of liquor per week     Comment: 3 times a week      Current Outpatient Medications   Medication Sig Dispense Refill    simvastatin (ZOCOR) 20 MG tablet Take 1 tablet by mouth daily 90 tablet 1    cloNIDine (CATAPRES) 0.3 MG tablet TAKE ONE TABLET BY MOUTH ONCE NIGHTLY 90 tablet 2    spironolactone (ALDACTONE) 50 MG tablet TAKE ONE TABLET BY MOUTH LUNCH 90 tablet 2    Calcium Citrate-Vitamin D (CALCIUM CITRATE + D PO) Take 1 tablet by mouth daily      Calcium Polycarbophil (FIBER-CAPS PO) Take 5 capsules by mouth daily      esomeprazole (NEXIUM) 20 MG delayed release capsule Take 2 capsules by mouth daily 60 capsule 3    oxybutynin (DITROPAN) 5 MG tablet Take 5 mg by mouth 2 times daily      clindamycin (CLEOCIN) 300 MG capsule Take 1 capsule by mouth See Admin Instructions Take 2 caps 1 hr prior to dental maida't then 1 cap twice daily until gone 6 capsule 0    Multiple Vitamin TABS Take 1 tablet by mouth daily      Ascorbic Acid (VITAMIN C) 500 MG tablet Take 500 mg by mouth daily Indications: take as directed      Vitamin E 400 UNITS TABS Take 400 Units by mouth daily        No current facility-administered medications for this visit.       Allergies   Allergen Reactions    Ciprofloxacin     Codeine     Lisinopril     Morphine     Norvasc [Amlodipine Besylate]     Penicillins     respiratory distress. Breath sounds: Normal breath sounds. No wheezing. Musculoskeletal:      Right lower leg: No edema. Lymphadenopathy:      Cervical: No cervical adenopathy. Skin:     General: Skin is warm. Findings: No rash. Neurological:      Mental Status: She is alert and oriented to person, place, and time. Psychiatric:         Mood and Affect: Mood normal.         Behavior: Behavior normal.         Thought Content: Thought content normal.         Assessment:       Diagnosis Orders   1. Essential hypertension     2. Hyperlipidemia, unspecified hyperlipidemia type     3. BMI 36.0-36.9,adult     4. Malodorous urine  Urinalysis Reflex to Culture        Plan:      Return in about 6 months (around 8/25/2020) for hypertension. Try off all vitamins for 7-10 days and see if urine odor is better. Check urine   Call prn. Orders Placed This Encounter   Procedures    Urinalysis Reflex to Culture     Standing Status:   Future     Standing Expiration Date:   2/25/2021     Order Specific Question:   SPECIFY(EX-CATH,MIDSTREAM,CYSTO,ETC)? Answer:   mid stream     No orders of the defined types were placed in this encounter. Patient given educational materials - see patient instructions. Discussed use, benefit, and side effects of prescribed medications. All patient questions answered. Pt voiced understanding. Reviewed health maintenance. Instructed to continue current medications, diet and exercise. Patient agreed with treatment plan. Follow up as directed.      Electronicallysigned by Kimi Butler MD on 2/25/2020 at 10:40 AM

## 2020-02-25 NOTE — PATIENT INSTRUCTIONS
Patient Education        Hand Arthritis: Exercises  Introduction  Here are some examples of exercises for you to try. The exercises may be suggested for a condition or for rehabilitation. Start each exercise slowly. Ease off the exercises if you start to have pain. You will be told when to start these exercises and which ones will work best for you. How to do the exercises  Tendon glides   1. In this exercise, the steps follow one another to a make a continuous movement. 2. With your affected hand, point your fingers and thumb straight up. Your wrist should be relaxed, following the line of your fingers and thumb. 3. Curl your fingers so that the top two joints in them are bent, and your fingers wrap down. Your fingertips should touch or be near the base of your fingers. Your fingers will look like a hook. 4. Make a fist by bending your knuckles. Your thumb can gently rest against your index (pointing) finger. 5. Unwind your fingers slightly so that your fingertips can touch the base of your palm. Your thumb can rest against your index finger. 6. Move back to your starting position, with your fingers and thumb pointing up. 7. Repeat the series of motions 8 to 12 times. 8. Switch hands and repeat steps 1 through 6, even if only one hand is sore. Intrinsic flexion   1. Rest your affected hand on a table and bend the large joints where your fingers connect to your hand. Keep your thumb and the other joints in your fingers straight. 2. Slowly straighten your fingers. Your wrist should be relaxed, following the line of your fingers and thumb. 3. Move back to your starting position, with your hand bent. 4. Repeat 8 to 12 times. 5. Switch hands and repeat steps 1 through 4, even if only one hand is sore. Finger extension   1. Place your affected hand flat on a table. 2. Lift and then lower one finger at a time off the table. 3. Repeat 8 to 12 times.   4. Switch hands and repeat steps 1 through 3, even

## 2020-03-20 ENCOUNTER — TELEPHONE (OUTPATIENT)
Dept: OTHER | Age: 70
End: 2020-03-20

## 2020-03-21 ENCOUNTER — OFFICE VISIT (OUTPATIENT)
Dept: PRIMARY CARE CLINIC | Age: 70
End: 2020-03-21
Payer: MEDICARE

## 2020-03-21 VITALS
DIASTOLIC BLOOD PRESSURE: 98 MMHG | SYSTOLIC BLOOD PRESSURE: 148 MMHG | HEART RATE: 64 BPM | OXYGEN SATURATION: 99 % | TEMPERATURE: 97.9 F

## 2020-03-21 LAB
INFLUENZA A ANTIBODY: NORMAL
INFLUENZA B ANTIBODY: NORMAL

## 2020-03-21 PROCEDURE — G8482 FLU IMMUNIZE ORDER/ADMIN: HCPCS | Performed by: PHYSICIAN ASSISTANT

## 2020-03-21 PROCEDURE — 99213 OFFICE O/P EST LOW 20 MIN: CPT | Performed by: PHYSICIAN ASSISTANT

## 2020-03-21 PROCEDURE — 1123F ACP DISCUSS/DSCN MKR DOCD: CPT | Performed by: PHYSICIAN ASSISTANT

## 2020-03-21 PROCEDURE — G8427 DOCREV CUR MEDS BY ELIG CLIN: HCPCS | Performed by: PHYSICIAN ASSISTANT

## 2020-03-21 PROCEDURE — 3017F COLORECTAL CA SCREEN DOC REV: CPT | Performed by: PHYSICIAN ASSISTANT

## 2020-03-21 PROCEDURE — 1090F PRES/ABSN URINE INCON ASSESS: CPT | Performed by: PHYSICIAN ASSISTANT

## 2020-03-21 PROCEDURE — 4040F PNEUMOC VAC/ADMIN/RCVD: CPT | Performed by: PHYSICIAN ASSISTANT

## 2020-03-21 PROCEDURE — G8399 PT W/DXA RESULTS DOCUMENT: HCPCS | Performed by: PHYSICIAN ASSISTANT

## 2020-03-21 PROCEDURE — G8417 CALC BMI ABV UP PARAM F/U: HCPCS | Performed by: PHYSICIAN ASSISTANT

## 2020-03-21 PROCEDURE — 1036F TOBACCO NON-USER: CPT | Performed by: PHYSICIAN ASSISTANT

## 2020-03-21 PROCEDURE — 87804 INFLUENZA ASSAY W/OPTIC: CPT | Performed by: PHYSICIAN ASSISTANT

## 2020-03-21 RX ORDER — AZITHROMYCIN 250 MG/1
250 TABLET, FILM COATED ORAL SEE ADMIN INSTRUCTIONS
Qty: 6 TABLET | Refills: 0 | Status: SHIPPED | OUTPATIENT
Start: 2020-03-21 | End: 2020-03-26

## 2020-03-21 RX ORDER — PREDNISONE 20 MG/1
20 TABLET ORAL 2 TIMES DAILY
Qty: 10 TABLET | Refills: 0 | Status: SHIPPED | OUTPATIENT
Start: 2020-03-21 | End: 2020-03-26

## 2020-03-21 ASSESSMENT — ENCOUNTER SYMPTOMS
SORE THROAT: 0
DIARRHEA: 0
SINUS PAIN: 0
SHORTNESS OF BREATH: 0
CHEST TIGHTNESS: 0
VOMITING: 0
COUGH: 1
WHEEZING: 0
NAUSEA: 0
EYES NEGATIVE: 1
RHINORRHEA: 0
SINUS PRESSURE: 1
ABDOMINAL PAIN: 0

## 2020-03-21 NOTE — PATIENT INSTRUCTIONS
Patient Education        Upper Respiratory Infection (Cold): Care Instructions  Your Care Instructions    An upper respiratory infection, or URI, is an infection of the nose, sinuses, or throat. URIs are spread by coughs, sneezes, and direct contact. The common cold is the most frequent kind of URI. The flu and sinus infections are other kinds of URIs. Almost all URIs are caused by viruses. Antibiotics won't cure them. But you can treat most infections with home care. This may include drinking lots of fluids and taking over-the-counter pain medicine. You will probably feel better in 4 to 10 days. The doctor has checked you carefully, but problems can develop later. If you notice any problems or new symptoms, get medical treatment right away. Follow-up care is a key part of your treatment and safety. Be sure to make and go to all appointments, and call your doctor if you are having problems. It's also a good idea to know your test results and keep a list of the medicines you take. How can you care for yourself at home? · To prevent dehydration, drink plenty of fluids, enough so that your urine is light yellow or clear like water. Choose water and other caffeine-free clear liquids until you feel better. If you have kidney, heart, or liver disease and have to limit fluids, talk with your doctor before you increase the amount of fluids you drink. · Take an over-the-counter pain medicine, such as acetaminophen (Tylenol), ibuprofen (Advil, Motrin), or naproxen (Aleve). Read and follow all instructions on the label. · Before you use cough and cold medicines, check the label. These medicines may not be safe for young children or for people with certain health problems. · Be careful when taking over-the-counter cold or flu medicines and Tylenol at the same time. Many of these medicines have acetaminophen, which is Tylenol. Read the labels to make sure that you are not taking more than the recommended dose.  Too much acetaminophen (Tylenol) can be harmful. · Get plenty of rest.  · Do not smoke or allow others to smoke around you. If you need help quitting, talk to your doctor about stop-smoking programs and medicines. These can increase your chances of quitting for good. When should you call for help? Call 911 anytime you think you may need emergency care. For example, call if:    · You have severe trouble breathing.    Call your doctor now or seek immediate medical care if:    · You seem to be getting much sicker.     · You have new or worse trouble breathing.     · You have a new or higher fever.     · You have a new rash.    Watch closely for changes in your health, and be sure to contact your doctor if:    · You have a new symptom, such as a sore throat, an earache, or sinus pain.     · You cough more deeply or more often, especially if you notice more mucus or a change in the color of your mucus.     · You do not get better as expected. Where can you learn more? Go to https://PharmAthene.Moodswiing. org and sign in to your [facility] account. Enter O687 in the Kiio box to learn more about \"Upper Respiratory Infection (Cold): Care Instructions. \"     If you do not have an account, please click on the \"Sign Up Now\" link. Current as of: June 9, 2019Content Version: 12.4  © 9863-1928 Healthwise, Incorporated. Care instructions adapted under license by [facility]. If you have questions about a medical condition or this instruction, always ask your healthcare professional. [place] [address] any warranty or liability for your use of this information.        Preventing the Spread of Coronavirus Disease 2019 in Homes and Residential Communities   For the most recent information go to RetailCleaners.fi    Prevention steps for People with confirmed or suspected COVID-19 (including persons under investigation) who do not need to be you have or may have COVID-19. This will help the healthcare providers office take steps to keep other people from getting infected or exposed. Wear a facemask  You should wear a facemask when you are around other people (e.g., sharing a room or vehicle) or pets and before you enter a healthcare providers office. If you are not able to wear a facemask (for example, because it causes trouble breathing), then people who live with you should not stay in the same room with you, or they should wear a facemask if they enter your room. Cover your coughs and sneezes  Cover your mouth and nose with a tissue when you cough or sneeze. Throw used tissues in a lined trash can. Immediately wash your hands with soap and water for at least 20 seconds or, if soap and water are not available, clean your hands with an alcohol-based hand  that contains at least 60% alcohol. Clean your hands often  Wash your hands often with soap and water for at least 20 seconds, especially after blowing your nose, coughing, or sneezing; going to the bathroom; and before eating or preparing food. If soap and water are not readily available, use an alcohol-based hand  with at least 60% alcohol, covering all surfaces of your hands and rubbing them together until they feel dry. Soap and water are the best option if hands are visibly dirty. Avoid touching your eyes, nose, and mouth with unwashed hands. Avoid sharing personal household items  You should not share dishes, drinking glasses, cups, eating utensils, towels, or bedding with other people or pets in your home. After using these items, they should be washed thoroughly with soap and water. Clean all high-touch surfaces everyday  High touch surfaces include counters, tabletops, doorknobs, bathroom fixtures, toilets, phones, keyboards, tablets, and bedside tables. Also, clean any surfaces that may have blood, stool, or body fluids on them.  Use a household cleaning spray or cleaned and disinfected after each use by an ill person. If this is not possible, the caregiver should wait as long as practical after use by an ill person to clean and disinfect the high-touch surfaces. How to clean and disinfect:  Hard Surfaces   Wear disposable gloves when cleaning and disinfecting surfaces. Gloves should be discarded after each cleaning. If reusable gloves are used, those gloves should be dedicated for cleaning and disinfection of surfaces for COVID-19 and should not be used for other purposes. Consult the 's instructions for cleaning and disinfection products used. Clean hands immediately after gloves are removed.  If surfaces are dirty, they should be cleaned using a detergent or soap and water prior to disinfection.  For disinfection, diluted household bleach solutions, alcohol solutions with at least 70% alcohol, and most common EPA-registered household disinfectants should be effective.   o Diluted household bleach solutions can be used if appropriate for the surface. Follow 's instructions for application and proper ventilation. Check to ensure the product is not past its expiration date. Never mix household bleach with ammonia or any other cleanser. Unexpired household bleach will be effective against coronaviruses when properly diluted. - Prepare a bleach solution by mixing:   - 5 tablespoons (1/3rd cup) bleach per gallon of water or  - 4 teaspoons bleach per quart of water  o Products with EPA-approved emerging viral pathogens claimspdf iconexternal icon are expected to be effective against COVID-19 based on data for harder to kill viruses. Follow the 's instructions for all cleaning and disinfection products (e.g., concentration, application method and contact time, etc.).   Soft (porous) surfaces such as carpeted floor, rugs, and drapes  Remove visible contamination if present and clean with appropriate  indicated for use on these surfaces. After cleaning: Launder items as appropriate in accordance with the 's instructions. If possible, launder items using the warmest appropriate water setting for the items and dry items completely, or    Clothing, towels, linens and other items that go in the laundry   Wear disposable gloves when handling dirty laundry from an ill person and then discard after each use. If using reusable gloves, those gloves should be dedicated for cleaning and disinfection of surfaces for COVID-19 and should not be used for other household purposes. Clean hands immediately after gloves are removed. o If no gloves are used when handling dirty laundry, be sure to wash hands afterwards. o If possible, do not shake dirty laundry. This will minimize the possibility of dispersing virus through the air.  o Launder items as appropriate in accordance with the 's instructions. If possible, launder items using the warmest appropriate water setting for the items and dry items completely. Dirty laundry from an ill person can be washed with other people's items. o Clean and disinfect clothes hampers according to guidance above for surfaces. If possible, consider placing a  that is either disposable (can be thrown away) or can be laundered. [facility] has a list of EPA approved cleaning products on their website - [employer].piALGO Technologies. html  Sharewire/Novel-Coronavirus-Fighting-Products-List.pdf            Steps to help prevent the spread of COVID-19 if you are sick  SOURCE - https://natasha-kyler.info/. html     Stay home except to get medical care   ; Stay home: People who are mildly ill with COVID-19 are able to isolate at home during their illness.  You should restrict activities outside your home, except for getting medical care.   ; Avoid public areas: Do not go to work, school, or public areas.   ; Avoid public transportation: Avoid using public transportation, ride-sharing, or taxis.  ; Separate yourself from other people and animals in your home   ; Stay away from others: As much as possible, you should stay in a specific room and away from other people in your home. Also, you should use a separate bathroom, if available.   ; Limit contact with pets & animals: You should restrict contact with pets and other animals while you are sick with COVID-19, just like you would around other people. Although there have not been reports of pets or other animals becoming sick with COVID-19, it is still recommended that people sick with COVID-19 limit contact with animals until more information is known about the virus. ; When possible, have another member of your household care for your animals while you are sick. If you are sick with COVID-19, avoid contact with your pet, including petting, snuggling, being kissed or licked, and sharing food. If you must care for your pet or be around animals while you are sick, wash your hands before and after you interact with pets and wear a facemask. See COVID-19 and Animals for more information. Other considerations   The ill person should eat/be fed in their room if possible. Non-disposable  items used should be handled with gloves and washed with hot water or in a . Clean hands after handling used  items.  If possible, dedicate a lined trash can for the ill person. Use gloves when removing garbage bags, handling, and disposing of trash. Wash hands after handling or disposing of trash.  Consider consulting with your local health department about trash disposal guidance if available.     Information for Household Members and Caregivers of Someone who is Sick   Call ahead before visiting your doctor   Call ahead: If you have a medical appointment, call the healthcare provider and tell them that you have or may have COVID-19. This will help the healthcare provider's office take steps to keep other people from getting infected or exposed. Wear a facemask if you are sick   ; If you are sick: You should wear a facemask when you are around other people (e.g., sharing a room or vehicle) or pets and before you enter a healthcare provider's office. ; If you are caring for others: If the person who is sick is not able to wear a facemask (for example, because it causes trouble breathing), then people who live with the person who is sick should not stay in the same room with them, or they should wear a facemask if they enter a room with the person who is sick. Cover your coughs and sneezes   ; Cover: Cover your mouth and nose with a tissue when you cough or sneeze.   ; Dispose: Throw used tissues in a lined trash can.   ; Wash hands: Immediately wash your hands with soap and water for at least 20 seconds or, if soap and water are not available, clean your hands with an alcohol-based hand  that contains at least 60% alcohol. Clean your hands often   ; Wash hands: Wash your hands often with soap and water for at least 20 seconds, especially after blowing your nose, coughing, or sneezing; going to the bathroom; and before eating or preparing food.   ; Hand : If soap and water are not readily available, use an alcohol-based hand  with at least 60% alcohol, covering all surfaces of your hands and rubbing them together until they feel dry.   ; Soap and water: Soap and water are the best option if hands are visibly dirty.   ; Avoid touching: Avoid touching your eyes, nose, and mouth with unwashed hands. Handwashing Tips   ; Wet your hands with clean, running water (warm or cold), turn off the tap, and apply soap.  ; Lather your hands by rubbing them together with the soap. Lather the backs of your hands, between your fingers, and under your nails. ; Scrub your hands for at least 20 seconds. Need a timer?  Hum professionals, as appropriate.  ; Call 911 if you have a medical emergency: If you have a medical emergency and need to call 911, notify the dispatch personnel that you have, or are being evaluated for COVID-19. If possible, put on a facemask before emergency medical services arrive.

## 2020-03-21 NOTE — PROGRESS NOTES
[address] [facility]. [facility] [address]  [address] [address]  Phone: [phone]  Fax: [address]    Pt Name: Maggie Schmidt  MRN: [id number]  [place] 1950  Date of evaluation: 3/21/2020  Provider: Claudeen Halim, PA-C     CHIEF COMPLAINT       Chief Complaint   Patient presents with    Cough     started SUnday - dry     Fever     monday 102 - has gone away since tuesday morning            HISTORY OF PRESENT ILLNESS  (Location/Symptom, Timing/Onset, Context/Setting, Quality, Duration, Modifying Factors, Severity.)   Maggie Schmidt is a 71 y.o. White [1] female who presents to the office for evaluation of      Fever    This is a new problem. The current episode started in the past 7 days. The problem occurs intermittently. The problem has been resolved. Associated symptoms include congestion, coughing, headaches and muscle aches. Pertinent negatives include no abdominal pain, chest pain, diarrhea, ear pain, nausea, rash, sleepiness, sore throat, urinary pain, vomiting or wheezing. Nursing Notes were reviewed. REVIEW OF SYSTEMS    (2-9 systems for level 4, 10 or more for level 5)     Review of Systems   Constitutional: Positive for chills, fatigue and fever. HENT: Positive for congestion, postnasal drip and sinus pressure. Negative for ear pain, rhinorrhea, sinus pain and sore throat. Eyes: Negative. Respiratory: Positive for cough. Negative for chest tightness, shortness of breath and wheezing. Cardiovascular: Negative for chest pain. Gastrointestinal: Negative for abdominal pain, diarrhea, nausea and vomiting. Genitourinary: Negative for dysuria. Musculoskeletal: Positive for myalgias. Skin: Negative for rash. Neurological: Positive for headaches. Except as noted above the remainder of the review of systems was reviewed andnegative. PAST MEDICAL HISTORY   History reviewed.     Past Medical History: Diagnosis Date    Arthritis     Asthma     \"winter\" asthma    Bronchitis     Constipation     GERD (gastroesophageal reflux disease)     Hyperlipidemia     Hypertension     Hypokalemia     Overactive bladder          SURGICAL HISTORY     History reviewed. Past Surgical History:   Procedure Laterality Date    BACK SURGERY  1983 and 1992    neck and lower back    BLADDER SURGERY  2006, 2008    2 surgeries: sling surgery first then another sling and tack bladder up surgery    CATARACT REMOVAL WITH IMPLANT Bilateral     COLONOSCOPY  2015    had polyp, done at [facility]. Dr Nima Clayton 8/21/2018    COLONOSCOPY POLYPECTOMY /COLD BX  performed by Donald Rodriguez MD at [address] [place] [address]  02/2017    upper rt back.  ME KNEE SCOPE,DIAGNOSTIC Right 10/4/2018    KNEE ARTHROSCOPY FOR PARTIAL, MEDIAL MENISCECTOMY.  performed by Jeni Manriquez MD at [facility] Bilateral 2013, 2015    Dr Parmjit Manuel       Current Outpatient Medications   Medication Sig Dispense Refill    azithromycin (ZITHROMAX) 250 MG tablet Take 1 tablet by mouth See Admin Instructions for 5 days 500mg on day 1 followed by 250mg on days 2 - 5 6 tablet 0    predniSONE (DELTASONE) 20 MG tablet Take 1 tablet by mouth 2 times daily for 5 days 10 tablet 0    simvastatin (ZOCOR) 20 MG tablet Take 1 tablet by mouth daily 90 tablet 1    cloNIDine (CATAPRES) 0.3 MG tablet TAKE ONE TABLET BY MOUTH ONCE NIGHTLY 90 tablet 2    spironolactone (ALDACTONE) 50 MG tablet TAKE ONE TABLET BY MOUTH LUNCH 90 tablet 2    Calcium Citrate-Vitamin D (CALCIUM CITRATE + D PO) Take 1 tablet by mouth daily      Calcium Polycarbophil (FIBER-CAPS PO) Take 5 capsules by mouth daily      esomeprazole (NEXIUM) 20 MG delayed release capsule Take 2 capsules by mouth daily 60 capsule 3    oxybutynin (DITROPAN) 5 MG tablet Take 5 mg by mouth 2 times daily      clindamycin (CLEOCIN)

## 2020-03-31 RX ORDER — SPIRONOLACTONE 50 MG/1
TABLET, FILM COATED ORAL
Qty: 90 TABLET | Refills: 2 | Status: SHIPPED | OUTPATIENT
Start: 2020-03-31 | End: 2020-12-22 | Stop reason: SDUPTHER

## 2020-03-31 RX ORDER — CLONIDINE HYDROCHLORIDE 0.3 MG/1
TABLET ORAL
Qty: 90 TABLET | Refills: 3 | Status: SHIPPED | OUTPATIENT
Start: 2020-03-31 | End: 2021-03-29

## 2020-04-15 ENCOUNTER — HOSPITAL ENCOUNTER (OUTPATIENT)
Dept: GENERAL RADIOLOGY | Age: 70
Discharge: HOME | End: 2020-04-17
Payer: MEDICARE

## 2020-04-15 ENCOUNTER — APPOINTMENT (OUTPATIENT)
Dept: GENERAL RADIOLOGY | Age: 70
End: 2020-04-15
Payer: MEDICARE

## 2020-04-15 ENCOUNTER — HOSPITAL ENCOUNTER (OUTPATIENT)
Age: 70
Discharge: HOME | End: 2020-04-17
Payer: MEDICARE

## 2020-04-15 ENCOUNTER — TELEPHONE (OUTPATIENT)
Dept: PRIMARY CARE CLINIC | Age: 70
End: 2020-04-15

## 2020-04-15 ENCOUNTER — HOSPITAL ENCOUNTER (EMERGENCY)
Age: 70
Discharge: HOME | End: 2020-04-15
Attending: EMERGENCY MEDICINE
Payer: MEDICARE

## 2020-04-15 VITALS
HEIGHT: 64 IN | DIASTOLIC BLOOD PRESSURE: 108 MMHG | RESPIRATION RATE: 16 BRPM | SYSTOLIC BLOOD PRESSURE: 146 MMHG | TEMPERATURE: 98.9 F | OXYGEN SATURATION: 95 % | HEART RATE: 89 BPM | BODY MASS INDEX: 34.15 KG/M2 | WEIGHT: 200 LBS

## 2020-04-15 PROCEDURE — 73600 X-RAY EXAM OF ANKLE: CPT

## 2020-04-15 PROCEDURE — 73630 X-RAY EXAM OF FOOT: CPT

## 2020-04-15 PROCEDURE — 99283 EMERGENCY DEPT VISIT LOW MDM: CPT

## 2020-04-15 ASSESSMENT — PAIN SCALES - GENERAL: PAINLEVEL_OUTOF10: 9

## 2020-08-18 ENCOUNTER — OFFICE VISIT (OUTPATIENT)
Dept: PRIMARY CARE CLINIC | Age: 70
End: 2020-08-18
Payer: MEDICARE

## 2020-08-18 VITALS
OXYGEN SATURATION: 96 % | WEIGHT: 197.8 LBS | SYSTOLIC BLOOD PRESSURE: 118 MMHG | TEMPERATURE: 97.5 F | DIASTOLIC BLOOD PRESSURE: 80 MMHG | HEART RATE: 71 BPM | BODY MASS INDEX: 33.95 KG/M2

## 2020-08-18 PROCEDURE — 1123F ACP DISCUSS/DSCN MKR DOCD: CPT | Performed by: FAMILY MEDICINE

## 2020-08-18 PROCEDURE — 3017F COLORECTAL CA SCREEN DOC REV: CPT | Performed by: FAMILY MEDICINE

## 2020-08-18 PROCEDURE — 1036F TOBACCO NON-USER: CPT | Performed by: FAMILY MEDICINE

## 2020-08-18 PROCEDURE — G8427 DOCREV CUR MEDS BY ELIG CLIN: HCPCS | Performed by: FAMILY MEDICINE

## 2020-08-18 PROCEDURE — G8399 PT W/DXA RESULTS DOCUMENT: HCPCS | Performed by: FAMILY MEDICINE

## 2020-08-18 PROCEDURE — 1090F PRES/ABSN URINE INCON ASSESS: CPT | Performed by: FAMILY MEDICINE

## 2020-08-18 PROCEDURE — 4040F PNEUMOC VAC/ADMIN/RCVD: CPT | Performed by: FAMILY MEDICINE

## 2020-08-18 PROCEDURE — G8417 CALC BMI ABV UP PARAM F/U: HCPCS | Performed by: FAMILY MEDICINE

## 2020-08-18 PROCEDURE — 99214 OFFICE O/P EST MOD 30 MIN: CPT | Performed by: FAMILY MEDICINE

## 2020-08-18 ASSESSMENT — ENCOUNTER SYMPTOMS
CHEST TIGHTNESS: 0
SHORTNESS OF BREATH: 0

## 2020-08-18 NOTE — PROGRESS NOTES
uYe Patel a 79 y.o. female who presents today for her medical conditions/complaints as notedbelow. Chief Complaint   Patient presents with    Hypertension     6mth BP check    Medication Refill         HPI:   Hypertension   This is a chronic problem. The current episode started more than 1 year ago. The problem is unchanged. The problem is controlled. Pertinent negatives include no chest pain, palpitations or shortness of breath. There are no associated agents to hypertension. Risk factors for coronary artery disease include post-menopausal state (walking). Past treatments include diuretics and central alpha agonists. The current treatment provides significant improvement. There are no compliance problems. There is no history of kidney disease or CAD/MI. LDL Cholesterol (mg/dL)   Date Value   12/09/2019 106     LDL Calculated (mg/dL)   Date Value   08/01/2018 75   06/26/2017 70       (goal LDL is <100)   AST (U/L)   Date Value   12/09/2019 19     ALT (U/L)   Date Value   12/09/2019 17     BUN (mg/dL)   Date Value   12/09/2019 16     BP Readings from Last 3 Encounters:   08/18/20 118/80   04/15/20 (!) 146/108   03/21/20 (!) 148/98          (goal 120/80)    Past Medical History:   Diagnosis Date    Arthritis     Asthma     \"winter\" asthma    Bronchitis     Constipation     GERD (gastroesophageal reflux disease)     Hyperlipidemia     Hypertension     Hypokalemia     Overactive bladder       Past Surgical History:   Procedure Laterality Date    BACK SURGERY  1983 and 1992    neck and lower back    BLADDER SURGERY  2006, 2008    2 surgeries: sling surgery first then another sling and tack bladder up surgery    CATARACT REMOVAL WITH IMPLANT Bilateral     COLONOSCOPY  2015    had polyp, done at [facility]. Dr Harding Closs 8/21/2018    COLONOSCOPY POLYPECTOMY /COLD BX  performed by Dane Lopez MD at [address] [place] [address]  02/2017    upper rt back.     CT KNEE SCOPE,DIAGNOSTIC Right 10/4/2018    KNEE ARTHROSCOPY FOR PARTIAL, MEDIAL MENISCECTOMY. performed by Duy Shelodn MD at [address] Bilateral 2013, 2015    Dr Saji Calle History   Problem Relation Age of Onset    Hypertension Mother     Fainting Mother     Hypertension Sister     Heart Disease Maternal Grandmother        Social History     Tobacco Use    Smoking status: Never Smoker    Smokeless tobacco: Never Used   Substance Use Topics    Alcohol use: Yes     Alcohol/week: 0.0 standard drinks     Types: 1 Glasses of wine, 1 Cans of beer, 1 Shots of liquor per week     Comment: 3 times a week      Current Outpatient Medications   Medication Sig Dispense Refill    spironolactone (ALDACTONE) 50 MG tablet TAKE ONE TABLET BY MOUTH LUNCH 90 tablet 2    cloNIDine (CATAPRES) 0.3 MG tablet TAKE ONE TABLET BY MOUTH ONCE NIGHTLY 90 tablet 3    Calcium Citrate-Vitamin D (CALCIUM CITRATE + D PO) Take 1 tablet by mouth daily      Calcium Polycarbophil (FIBER-CAPS PO) Take 5 capsules by mouth daily      esomeprazole (NEXIUM) 20 MG delayed release capsule Take 2 capsules by mouth daily 60 capsule 3    oxybutynin (DITROPAN) 5 MG tablet Take 5 mg by mouth 2 times daily      clindamycin (CLEOCIN) 300 MG capsule Take 1 capsule by mouth See Admin Instructions Take 2 caps 1 hr prior to dental maida't then 1 cap twice daily until gone 6 capsule 0    Multiple Vitamin TABS Take 1 tablet by mouth daily      Ascorbic Acid (VITAMIN C) 500 MG tablet Take 500 mg by mouth daily Indications: take as directed      Vitamin E 400 UNITS TABS Take 400 Units by mouth daily       simvastatin (ZOCOR) 20 MG tablet TAKE ONE TABLET BY MOUTH DAILY 30 tablet 5     No current facility-administered medications for this visit.       Allergies   Allergen Reactions    Ciprofloxacin     Codeine     Lisinopril     Morphine     Norvasc [Amlodipine Besylate]     Penicillins    Venu Thomas [Trospium] Nausea Only     Bloated, nauseated    Xarelto [Rivaroxaban]        Health Maintenance   Topic Date Due    Shingles Vaccine (2 of 3) 2012    Annual Wellness Visit (AWV)  2019    Breast cancer screen  2020    Flu vaccine (1) 2020    Potassium monitoring  2020    Creatinine monitoring  2020    Lipid screen  2024    DTaP/Tdap/Td vaccine (2 - Td) 2026    Colon cancer screen colonoscopy  2028    DEXA (modify frequency per FRAX score)  Completed    Pneumococcal 65+ years Vaccine  Completed    Hepatitis C screen  Completed    Hepatitis A vaccine  Aged Out    Hepatitis B vaccine  Aged Out    Hib vaccine  Aged Out    Meningococcal (ACWY) vaccine  Aged Out       Subjective:      Review of Systems   Respiratory: Negative for chest tightness and shortness of breath. Cardiovascular: Negative for chest pain and palpitations. Neurological: Positive for dizziness. Dizziness in the am after she's been up and low BP doesn't last too lon/65, off and on, not daily. Maybe twice a month       Objective:     /80   Pulse 71   Temp 97.5 °F (36.4 °C)   Wt 197 lb 12.8 oz (89.7 kg)   SpO2 96%   BMI 33.95 kg/m²   Physical Exam  Vitals signs and nursing note reviewed. Constitutional:       General: She is not in acute distress. Appearance: Normal appearance. She is well-developed. She is not ill-appearing. HENT:      Head: Normocephalic and atraumatic. Right Ear: Tympanic membrane and ear canal normal.      Left Ear: Tympanic membrane and ear canal normal.   Eyes:      General: No scleral icterus. Right eye: No discharge. Left eye: No discharge. Conjunctiva/sclera: Conjunctivae normal.      Pupils: Pupils are equal, round, and reactive to light. Neck:      Thyroid: No thyromegaly. Trachea: No tracheal deviation. Cardiovascular:      Rate and Rhythm: Normal rate and regular rhythm.       Pulses: Normal pulses. Heart sounds: Normal heart sounds. Comments: No carotid bruits  Pulmonary:      Effort: Pulmonary effort is normal. No respiratory distress. Breath sounds: Normal breath sounds. No wheezing. Musculoskeletal:      Right lower leg: No edema. Left lower leg: No edema. Lymphadenopathy:      Cervical: No cervical adenopathy. Skin:     General: Skin is warm. Findings: No rash. Neurological:      Mental Status: She is alert and oriented to person, place, and time. Psychiatric:         Mood and Affect: Mood normal.         Behavior: Behavior normal.         Thought Content: Thought content normal.         Assessment:       Diagnosis Orders   1. Essential hypertension  Comprehensive Metabolic Panel, Fasting    Lipid Panel   2. Breast cancer screening by mammogram  JORGE DIGITAL SCREEN W OR WO CAD BILATERAL   3. Hyperlipidemia, unspecified hyperlipidemia type  Comprehensive Metabolic Panel, Fasting    Lipid Panel        Plan:      Return in about 6 months (around 2/18/2021) for hypertension. Call if dizziness occurs more frequently  Shingles vaccine is too expensive for her. Orders Placed This Encounter   Procedures    JORGE DIGITAL SCREEN W OR WO CAD BILATERAL     Standing Status:   Future     Standing Expiration Date:   8/18/2021     Scheduling Instructions:      Dx Code  Z12.31     Order Specific Question:   Reason for exam:     Answer:   screening    Comprehensive Metabolic Panel, Fasting     Standing Status:   Future     Standing Expiration Date:   8/18/2021    Lipid Panel     Standing Status:   Future     Standing Expiration Date:   8/18/2021     Order Specific Question:   Is Patient Fasting?/# of Hours     Answer:   yes     No orders of the defined types were placed in this encounter. Patient given educational materials - see patient instructions. Discussed use, benefit, and side effects of prescribed medications. All patient questions answered.  Pt voiced understanding. Reviewed health maintenance. Instructed to continue current medications, diet and exercise. Patient agreed with treatment plan. Follow up as directed.      Electronicallysigned by Magdaleno Loomis MD on 8/18/2020 at 9:23 AM

## 2020-08-18 NOTE — PATIENT INSTRUCTIONS
Patient Education        Recombinant Zoster (Shingles) Vaccine: What You Need to Know  Why get vaccinated? Recombinant zoster (shingles) vaccine can prevent shingles. Shingles (also called herpes zoster, or just zoster) is a painful skin rash, usually with blisters. In addition to the rash, shingles can cause fever, headache, chills, or upset stomach. More rarely, shingles can lead to pneumonia, hearing problems, blindness, brain inflammation (encephalitis), or death. The most common complication of shingles is long-term nerve pain called postherpetic neuralgia (PHN). PHN occurs in the areas where the shingles rash was, even after the rash clears up. It can last for months or years after the rash goes away. The pain from PHN can be severe and debilitating. About 10 to 18% of people who get shingles will experience PHN. The risk of PHN increases with age. An older adult with shingles is more likely to develop PHN and have longer lasting and more severe pain than a younger person with shingles. Shingles is caused by the varicella zoster virus, the same virus that causes chickenpox. After you have chickenpox, the virus stays in your body and can cause shingles later in life. Shingles cannot be passed from one person to another, but the virus that causes shingles can spread and cause chickenpox in someone who had never had chickenpox or received chickenpox vaccine. Recombinant shingles vaccine  Recombinant shingles vaccine provides strong protection against shingles. By preventing shingles, recombinant shingles vaccine also protects against PHN. Recombinant shingles vaccine is the preferred vaccine for the prevention of shingles. However, a different vaccine, live shingles vaccine, may be used in some circumstances. The recombinant shingles vaccine is recommended for adults 50 years and older without serious immune problems. It is given as a two-dose series.   This vaccine is also recommended for people who have already gotten another type of shingles vaccine, the live shingles vaccine. There is no live virus in this vaccine. Shingles vaccine may be given at the same time as other vaccines. Talk with your health care provider  Tell your vaccine provider if the person getting the vaccine:  · Has had an allergic reaction after a previous dose of recombinant shingles vaccine, or has any severe, life-threatening allergies. · Is pregnant or breastfeeding. · Is currently experiencing an episode of shingles. In some cases, your health care provider may decide to postpone shingles vaccination to a future visit. People with minor illnesses, such as a cold, may be vaccinated. People who are moderately or severely ill should usually wait until they recover before getting recombinant shingles vaccine. Your health care provider can give you more information. Risks of a vaccine reaction  · A sore arm with mild or moderate pain is very common after recombinant shingles vaccine, affecting about 80% of vaccinated people. Redness and swelling can also happen at the site of the injection. · Tiredness, muscle pain, headache, shivering, fever, stomach pain, and nausea happen after vaccination in more than half of people who receive recombinant shingles vaccine. In clinical trials, about 1 out of 6 people who got recombinant zoster vaccine experienced side effects that prevented them from doing regular activities. Symptoms usually went away on their own in 2 to 3 days. You should still get the second dose of recombinant zoster vaccine even if you had one of these reactions after the first dose. People sometimes faint after medical procedures, including vaccination. Tell your provider if you feel dizzy or have vision changes or ringing in the ears. As with any medicine, there is a very remote chance of a vaccine causing a severe allergic reaction, other serious injury, or death. What if there is a serious problem?   An allergic

## 2020-09-23 RX ORDER — SIMVASTATIN 20 MG
TABLET ORAL
Qty: 90 TABLET | Refills: 4 | Status: SHIPPED | OUTPATIENT
Start: 2020-09-23 | End: 2021-09-29

## 2020-10-19 ENCOUNTER — HOSPITAL ENCOUNTER (OUTPATIENT)
Age: 70
Setting detail: SPECIMEN
Discharge: HOME | End: 2020-10-19
Payer: MEDICARE

## 2020-10-19 ENCOUNTER — NURSE ONLY (OUTPATIENT)
Dept: PRIMARY CARE CLINIC | Age: 70
End: 2020-10-19
Payer: MEDICARE

## 2020-10-19 VITALS — TEMPERATURE: 97.1 F

## 2020-10-19 LAB
ALBUMIN SERPL-MCNC: 4.2 G/DL (ref 3.5–5.2)
ALBUMIN/GLOBULIN RATIO: 1.4 (ref 1–2.5)
ALP BLD-CCNC: 87 U/L (ref 35–104)
ALT SERPL-CCNC: 15 U/L (ref 5–33)
ANION GAP SERPL CALCULATED.3IONS-SCNC: 12 MMOL/L (ref 9–17)
AST SERPL-CCNC: 21 U/L
BILIRUB SERPL-MCNC: 0.66 MG/DL (ref 0.3–1.2)
BUN BLDV-MCNC: 15 MG/DL (ref 8–23)
BUN/CREAT BLD: ABNORMAL (ref 9–20)
CALCIUM SERPL-MCNC: 9.5 MG/DL (ref 8.6–10.4)
CHLORIDE BLD-SCNC: 107 MMOL/L (ref 98–107)
CHOLESTEROL/HDL RATIO: 2.7
CHOLESTEROL: 153 MG/DL
CO2: 24 MMOL/L (ref 20–31)
CREAT SERPL-MCNC: 1.03 MG/DL (ref 0.5–0.9)
GFR AFRICAN AMERICAN: >60 ML/MIN
GFR NON-AFRICAN AMERICAN: 53 ML/MIN
GFR SERPL CREATININE-BSD FRML MDRD: ABNORMAL ML/MIN/{1.73_M2}
GFR SERPL CREATININE-BSD FRML MDRD: ABNORMAL ML/MIN/{1.73_M2}
GLUCOSE FASTING: 83 MG/DL (ref 70–99)
HDLC SERPL-MCNC: 56 MG/DL
LDL CHOLESTEROL: 67 MG/DL (ref 0–130)
POTASSIUM SERPL-SCNC: 4.6 MMOL/L (ref 3.7–5.3)
SODIUM BLD-SCNC: 143 MMOL/L (ref 135–144)
TOTAL PROTEIN: 7.2 G/DL (ref 6.4–8.3)
TRIGL SERPL-MCNC: 151 MG/DL
VLDLC SERPL CALC-MCNC: ABNORMAL MG/DL (ref 1–30)

## 2020-10-19 PROCEDURE — 90694 VACC AIIV4 NO PRSRV 0.5ML IM: CPT | Performed by: FAMILY MEDICINE

## 2020-10-19 PROCEDURE — G0008 ADMIN INFLUENZA VIRUS VAC: HCPCS | Performed by: FAMILY MEDICINE

## 2020-10-19 ASSESSMENT — PATIENT HEALTH QUESTIONNAIRE - PHQ9
1. LITTLE INTEREST OR PLEASURE IN DOING THINGS: 0
SUM OF ALL RESPONSES TO PHQ QUESTIONS 1-9: 0
SUM OF ALL RESPONSES TO PHQ QUESTIONS 1-9: 0
SUM OF ALL RESPONSES TO PHQ9 QUESTIONS 1 & 2: 0
SUM OF ALL RESPONSES TO PHQ QUESTIONS 1-9: 0
2. FEELING DOWN, DEPRESSED OR HOPELESS: 0

## 2020-10-19 NOTE — PROGRESS NOTES
After obtaining consent, and per orders of Dr. Jamel Vila, injection of High Dose Flu Vaccine given in Left deltoid by David Ronquillo. Patient instructed to remain in clinic for 20 minutes afterwards, and to report any adverse reaction to me immediately.

## 2020-10-28 ENCOUNTER — HOSPITAL ENCOUNTER (OUTPATIENT)
Dept: WOMENS IMAGING | Age: 70
Discharge: HOME | End: 2020-10-30
Payer: MEDICARE

## 2020-10-28 PROCEDURE — 77063 BREAST TOMOSYNTHESIS BI: CPT

## 2020-12-22 RX ORDER — SPIRONOLACTONE 50 MG/1
TABLET, FILM COATED ORAL
Qty: 90 TABLET | Refills: 2 | Status: SHIPPED | OUTPATIENT
Start: 2020-12-22 | End: 2021-09-29

## 2021-05-10 ENCOUNTER — OFFICE VISIT (OUTPATIENT)
Dept: PRIMARY CARE CLINIC | Age: 71
End: 2021-05-10
Payer: MEDICARE

## 2021-05-10 VITALS
DIASTOLIC BLOOD PRESSURE: 80 MMHG | HEIGHT: 64 IN | WEIGHT: 211.8 LBS | BODY MASS INDEX: 36.16 KG/M2 | HEART RATE: 71 BPM | TEMPERATURE: 97.9 F | OXYGEN SATURATION: 98 % | SYSTOLIC BLOOD PRESSURE: 118 MMHG

## 2021-05-10 DIAGNOSIS — E78.2 MIXED HYPERLIPIDEMIA: ICD-10-CM

## 2021-05-10 DIAGNOSIS — I10 ESSENTIAL HYPERTENSION: Primary | ICD-10-CM

## 2021-05-10 PROCEDURE — 3017F COLORECTAL CA SCREEN DOC REV: CPT | Performed by: FAMILY MEDICINE

## 2021-05-10 PROCEDURE — G8417 CALC BMI ABV UP PARAM F/U: HCPCS | Performed by: FAMILY MEDICINE

## 2021-05-10 PROCEDURE — G8427 DOCREV CUR MEDS BY ELIG CLIN: HCPCS | Performed by: FAMILY MEDICINE

## 2021-05-10 PROCEDURE — G8399 PT W/DXA RESULTS DOCUMENT: HCPCS | Performed by: FAMILY MEDICINE

## 2021-05-10 PROCEDURE — 1123F ACP DISCUSS/DSCN MKR DOCD: CPT | Performed by: FAMILY MEDICINE

## 2021-05-10 PROCEDURE — 4040F PNEUMOC VAC/ADMIN/RCVD: CPT | Performed by: FAMILY MEDICINE

## 2021-05-10 PROCEDURE — 1090F PRES/ABSN URINE INCON ASSESS: CPT | Performed by: FAMILY MEDICINE

## 2021-05-10 PROCEDURE — 1036F TOBACCO NON-USER: CPT | Performed by: FAMILY MEDICINE

## 2021-05-10 PROCEDURE — 99214 OFFICE O/P EST MOD 30 MIN: CPT | Performed by: FAMILY MEDICINE

## 2021-05-10 RX ORDER — CLONIDINE HYDROCHLORIDE 0.3 MG/1
0.3 TABLET ORAL NIGHTLY
Qty: 90 TABLET | Refills: 2 | Status: SHIPPED | OUTPATIENT
Start: 2021-05-10 | End: 2021-06-03

## 2021-05-10 SDOH — ECONOMIC STABILITY: TRANSPORTATION INSECURITY
IN THE PAST 12 MONTHS, HAS THE LACK OF TRANSPORTATION KEPT YOU FROM MEDICAL APPOINTMENTS OR FROM GETTING MEDICATIONS?: NO

## 2021-05-10 SDOH — ECONOMIC STABILITY: FOOD INSECURITY: WITHIN THE PAST 12 MONTHS, YOU WORRIED THAT YOUR FOOD WOULD RUN OUT BEFORE YOU GOT MONEY TO BUY MORE.: NEVER TRUE

## 2021-05-10 ASSESSMENT — PATIENT HEALTH QUESTIONNAIRE - PHQ9
SUM OF ALL RESPONSES TO PHQ QUESTIONS 1-9: 0
2. FEELING DOWN, DEPRESSED OR HOPELESS: 0
SUM OF ALL RESPONSES TO PHQ9 QUESTIONS 1 & 2: 0
SUM OF ALL RESPONSES TO PHQ QUESTIONS 1-9: 0
1. LITTLE INTEREST OR PLEASURE IN DOING THINGS: 0
SUM OF ALL RESPONSES TO PHQ QUESTIONS 1-9: 0

## 2021-05-10 NOTE — PROGRESS NOTES
[facility] PRIMARY CARE  [address] [facility] [address]  Dept: [address] Nathan Raymond is a 79 y.o. female Established patient, who presents today for her medical conditions/complaintsas noted below. Chief Complaint   Patient presents with    Hypertension     f/u    Medication Check     f/u       HPI:     HPI    No med SE  Likes candy and sweets. Eating salted nuts. Painted and bruised her lower leg. Reviewed prior notes None  Reviewed previous Labs    LDL Cholesterol (mg/dL)   Date Value   10/19/2020 67   12/09/2019 106     LDL Calculated (mg/dL)   Date Value   08/01/2018 75   06/26/2017 70       (goal LDL is <100)   AST (U/L)   Date Value   10/19/2020 21     ALT (U/L)   Date Value   10/19/2020 15     BUN (mg/dL)   Date Value   10/19/2020 15     BP Readings from Last 3 Encounters:   05/10/21 118/80   08/18/20 118/80   04/15/20 (!) 146/108          (goal 120/80)    Past Medical History:   Diagnosis Date    Arthritis     Asthma     \"winter\" asthma    Bronchitis     Constipation     GERD (gastroesophageal reflux disease)     Hyperlipidemia     Hypertension     Hypokalemia     Overactive bladder       Past Surgical History:   Procedure Laterality Date    BACK SURGERY  1983 and 1992    neck and lower back    BLADDER SURGERY  2006, 2008    2 surgeries: sling surgery first then another sling and tack bladder up surgery    CATARACT REMOVAL WITH IMPLANT Bilateral     COLONOSCOPY  2015    had polyp, done at [facility]. Dr Marck Delgado 8/21/2018    COLONOSCOPY POLYPECTOMY /COLD BX  performed by Anel Holley MD at [address] [facility]  02/2017    upper rt back.  OK KNEE SCOPE,DIAGNOSTIC Right 10/4/2018    KNEE ARTHROSCOPY FOR PARTIAL, MEDIAL MENISCECTOMY.  performed by Lisa Sal MD at [address] [facility] Bilateral 2013, 2015    Dr Coronado Royalty History   Problem Relation Age of Onset    Hypertension Mother     Fainting Mother     Hypertension Sister     Heart Disease Maternal Grandmother        Social History     Tobacco Use    Smoking status: Never Smoker    Smokeless tobacco: Never Used   Substance Use Topics    Alcohol use: Yes     Alcohol/week: 0.0 standard drinks     Types: 1 Glasses of wine, 1 Cans of beer, 1 Shots of liquor per week     Comment: 3 times a week      Current Outpatient Medications   Medication Sig Dispense Refill    cloNIDine (CATAPRES) 0.3 MG tablet Take 1 tablet by mouth nightly 90 tablet 2    spironolactone (ALDACTONE) 50 MG tablet TAKE ONE TABLET BY MOUTH LUNCH 90 tablet 2    Calcium Citrate-Vitamin D (CALCIUM CITRATE + D PO) Take 1 tablet by mouth daily      Calcium Polycarbophil (FIBER-CAPS PO) Take 5 capsules by mouth daily      esomeprazole (NEXIUM) 20 MG delayed release capsule Take 2 capsules by mouth daily 60 capsule 3    oxybutynin (DITROPAN) 5 MG tablet Take 5 mg by mouth 2 times daily      clindamycin (CLEOCIN) 300 MG capsule Take 1 capsule by mouth See Admin Instructions Take 2 caps 1 hr prior to dental maida't then 1 cap twice daily until gone 6 capsule 0    Multiple Vitamin TABS Take 1 tablet by mouth daily      Ascorbic Acid (VITAMIN C) 500 MG tablet Take 500 mg by mouth daily Indications: take as directed      Vitamin E 400 UNITS TABS Take 400 Units by mouth daily       simvastatin (ZOCOR) 20 MG tablet TAKE ONE TABLET BY MOUTH DAILY 90 tablet 4     No current facility-administered medications for this visit.       Allergies   Allergen Reactions    Ciprofloxacin     Codeine     Lisinopril     Morphine     Norvasc [Amlodipine Besylate]     Penicillins     Sanctura [Trospium] Nausea Only     Bloated, nauseated    Xarelto [Rivaroxaban]        Health Maintenance   Topic Date Due    Shingles Vaccine (2 of 3) 02/28/2012    Annual Wellness Visit (AWV)  Never done    Potassium monitoring  10/19/2021    Creatinine monitoring 10/19/2021    Breast cancer screen  10/28/2022    Lipid screen  10/19/2025    DTaP/Tdap/Td vaccine (2 - Td) 05/02/2026    Colon cancer screen colonoscopy  08/21/2028    DEXA (modify frequency per FRAX score)  Completed    Flu vaccine  Completed    Pneumococcal 65+ years Vaccine  Completed    COVID-19 Vaccine  Completed    Hepatitis C screen  Completed    Hepatitis A vaccine  Aged Out    Hepatitis B vaccine  Aged Out    Hib vaccine  Aged Out    Meningococcal (ACWY) vaccine  Aged Out       Subjective:      Review of Systems   Constitutional: Negative. Respiratory:        Sometimes breaths heavy. Not really SOB with activity. Cardiovascular: Negative. Gastrointestinal:        Occasionally gerd/heartburn issues. LUQ pain and was helped with tums. Objective:     /80   Pulse 71   Temp 97.9 °F (36.6 °C)   Ht 5' 4\" (1.626 m)   Wt 211 lb 12.8 oz (96.1 kg)   SpO2 98%   BMI 36.36 kg/m²   Physical Exam  Vitals signs and nursing note reviewed. Constitutional:       General: She is not in acute distress. Appearance: She is well-developed. She is not ill-appearing. HENT:      Head: Normocephalic and atraumatic. Right Ear: Tympanic membrane, ear canal and external ear normal.      Left Ear: Tympanic membrane, ear canal and external ear normal.   Eyes:      General: No scleral icterus. Right eye: No discharge. Left eye: No discharge. Conjunctiva/sclera: Conjunctivae normal.      Pupils: Pupils are equal, round, and reactive to light. Neck:      Thyroid: No thyromegaly. Trachea: No tracheal deviation. Cardiovascular:      Rate and Rhythm: Normal rate and regular rhythm. Heart sounds: Normal heart sounds. Pulmonary:      Effort: Pulmonary effort is normal. No respiratory distress. Breath sounds: Normal breath sounds. No wheezing. Musculoskeletal:      Right lower leg: No edema. Left lower leg: No edema.    Lymphadenopathy: Cervical: No cervical adenopathy. Skin:     General: Skin is warm. Findings: No rash. Comments: Bruise right distal lower legs   Neurological:      Mental Status: She is alert and oriented to person, place, and time. Psychiatric:         Mood and Affect: Mood normal.         Behavior: Behavior normal.         Thought Content: Thought content normal.         Assessment:       Diagnosis Orders   1. Essential hypertension  cloNIDine (CATAPRES) 0.3 MG tablet   2. Mixed hyperlipidemia          Plan:      Return in about 6 months (around 11/10/2021) for hypertension. No orders of the defined types were placed in this encounter. Orders Placed This Encounter   Medications    cloNIDine (CATAPRES) 0.3 MG tablet     Sig: Take 1 tablet by mouth nightly     Dispense:  90 tablet     Refill:  2       Patient given educationalmaterials - see patient instructions. Discussed use, benefit, and side effectsof prescribed medications. All patient questions answered. Pt voiced understanding. Reviewed health maintenance. Instructed to continue current medications, watch diet better andexercise. Patient agreed with treatment plan. Follow up as directed.      Electronicallysigned by Elliott Fletcher MD on 5/10/2021 at 10:27 AM

## 2021-05-10 NOTE — PATIENT INSTRUCTIONS
Patient Education        Learning About Eating More Fruits and Vegetables  What are some quick tips for eating more fruits and vegetables? We're all encouraged to eat more fruits and vegetables. Yet it can seem like one more chore on the daily to-do list. But you can add color and crunch to your mealsand lots of nutritionwith these quick tips. · Brighten up your breakfast.  ? Add sliced fruit or frozen berries to your yogurt, pancakes, or cereal.  ? Blend fresh or frozen fruit, veggies, and yogurt with a little fruit juice, and you've got a tasty smoothie. ? Make your scrambled eggs a gourmet treat by adding onions, celery, and bell peppers. ? Bake up some bran muffins with grated carrots added into the mix. · Make a livelier lunch. ? Jazz up tuna or chicken salad with apple chunks, celery, or grapesor all of them! ? Add cucumbers, avocado slices, tomatoes, and lettuce to your sandwiches. ? Kick up the flavor of grilled cheese sandwiches with spinach and tomatoes. ? Puree some potatoes or squash to add to tomato soup. · Add delicious veggies to dinner. ? Give more color and taste to salads. Stir in red cabbage, carrots, and bell peppers. Top salads with dried cranberries or raisins. \"Frost\" your salad with orange sections or strawberries. ? Keep a bag or two of frozen vegetables ready to pull out of the freezer for a side dish. ? Spice up spaghetti and meatballs with mushrooms and bell peppers. ? Roast vegetables like cauliflower or squash in the oven with olive oil to bring out their flavor. ? Season your veggie dish with herbs like basil and holly and a splash of lemon juice and olive oil. ? If you've got a main dish in the oven, stick in a potato to round out your meal.  · Grab some healthy snacks on the go. ? Scoop up an apple, banana, or plum for a quick snack. ? Cut up raw fruits and veggies to keep in your fridge. Grapes, oranges, carrots, and celery are great choices.  They'll be ready for a quick snack or an after-school treat. ? Dip raw vegetables in hummus or peanut butter. ? Keep dried fruit on hand for an easy \"take with you\" snack. · Make something sweetand healthy. ? Try baked apples or pears topped with cinnamon and honey for a delicious dessert. ? Make chocolate chip cookies even better with grated carrots added to the mix. Where can you learn more? Go to https://Audiosocket.JustBook. org and sign in to your [employer] account. Enter F050 in the Keynoir box to learn more about \"Learning About Eating More Fruits and Vegetables. \"     If you do not have an account, please click on the \"Sign Up Now\" link. Current as of: December 17, 2020               Content Version: 12.8  © 8836-0158 [employer]. Care instructions adapted under license by [facility] ([facility]). If you have questions about a medical condition or this instruction, always ask your healthcare professional. [place] [address] any warranty or liability for your use of this information.

## 2021-09-13 ENCOUNTER — OFFICE VISIT (OUTPATIENT)
Dept: PRIMARY CARE CLINIC | Age: 71
End: 2021-09-13
Payer: MEDICARE

## 2021-09-13 VITALS
HEART RATE: 81 BPM | WEIGHT: 213.6 LBS | DIASTOLIC BLOOD PRESSURE: 76 MMHG | BODY MASS INDEX: 36.47 KG/M2 | SYSTOLIC BLOOD PRESSURE: 114 MMHG | OXYGEN SATURATION: 97 % | HEIGHT: 64 IN

## 2021-09-13 DIAGNOSIS — Z23 NEED FOR VACCINATION: ICD-10-CM

## 2021-09-13 DIAGNOSIS — E78.2 MIXED HYPERLIPIDEMIA: ICD-10-CM

## 2021-09-13 DIAGNOSIS — I10 ESSENTIAL HYPERTENSION: Primary | ICD-10-CM

## 2021-09-13 DIAGNOSIS — Z00.00 ROUTINE GENERAL MEDICAL EXAMINATION AT A HEALTH CARE FACILITY: ICD-10-CM

## 2021-09-13 PROCEDURE — 3017F COLORECTAL CA SCREEN DOC REV: CPT | Performed by: FAMILY MEDICINE

## 2021-09-13 PROCEDURE — 90694 VACC AIIV4 NO PRSRV 0.5ML IM: CPT | Performed by: FAMILY MEDICINE

## 2021-09-13 PROCEDURE — G0008 ADMIN INFLUENZA VIRUS VAC: HCPCS | Performed by: FAMILY MEDICINE

## 2021-09-13 PROCEDURE — 4040F PNEUMOC VAC/ADMIN/RCVD: CPT | Performed by: FAMILY MEDICINE

## 2021-09-13 PROCEDURE — 1123F ACP DISCUSS/DSCN MKR DOCD: CPT | Performed by: FAMILY MEDICINE

## 2021-09-13 PROCEDURE — G0438 PPPS, INITIAL VISIT: HCPCS | Performed by: FAMILY MEDICINE

## 2021-09-13 RX ORDER — CHLORAL HYDRATE 500 MG
3000 CAPSULE ORAL DAILY
COMMUNITY

## 2021-09-13 RX ORDER — ASPIRIN 81 MG/1
81 TABLET ORAL DAILY
COMMUNITY

## 2021-09-13 ASSESSMENT — PATIENT HEALTH QUESTIONNAIRE - PHQ9
1. LITTLE INTEREST OR PLEASURE IN DOING THINGS: 0
SUM OF ALL RESPONSES TO PHQ QUESTIONS 1-9: 0
SUM OF ALL RESPONSES TO PHQ9 QUESTIONS 1 & 2: 0
SUM OF ALL RESPONSES TO PHQ QUESTIONS 1-9: 0
2. FEELING DOWN, DEPRESSED OR HOPELESS: 0
SUM OF ALL RESPONSES TO PHQ QUESTIONS 1-9: 0

## 2021-09-13 NOTE — PATIENT INSTRUCTIONS
Personalized Preventive Plan for Evaritso Duke - 9/13/2021  Medicare offers a range of preventive health benefits. Some of the tests and screenings are paid in full while other may be subject to a deductible, co-insurance, and/or copay. Some of these benefits include a comprehensive review of your medical history including lifestyle, illnesses that may run in your family, and various assessments and screenings as appropriate. After reviewing your medical record and screening and assessments performed today your provider may have ordered immunizations, labs, imaging, and/or referrals for you. A list of these orders (if applicable) as well as your Preventive Care list are included within your After Visit Summary for your review. Other Preventive Recommendations:    · A preventive eye exam performed by an eye specialist is recommended every 1-2 years to screen for glaucoma; cataracts, macular degeneration, and other eye disorders. · A preventive dental visit is recommended every 6 months. · Try to get at least 150 minutes of exercise per week or 10,000 steps per day on a pedometer . · Order or download the FREE \"Exercise & Physical Activity: Your Everyday Guide\" from [employer]. Call 2-257.468.5802 or search [employer] online. · You need 6866-4138 mg of calcium and 9017-0042 IU of vitamin D per day. It is possible to meet your calcium requirement with diet alone, but a vitamin D supplement is usually necessary to meet this goal.  · When exposed to the sun, use a sunscreen that protects against both UVA and UVB radiation with an SPF of 30 or greater. Reapply every 2 to 3 hours or after sweating, drying off with a towel, or swimming. · Always wear a seat belt when traveling in a car. Always wear a helmet when riding a bicycle or motorcycle. Patient Education        Well Visit, Over 72: Care Instructions  Overview     Well visits can help you stay healthy.  Your doctor has checked your overall health and may have suggested ways to take good care of yourself. Your doctor also may have recommended tests. At home, you can help prevent illness with healthy eating, regular exercise, and other steps. Follow-up care is a key part of your treatment and safety. Be sure to make and go to all appointments, and call your doctor if you are having problems. It's also a good idea to know your test results and keep a list of the medicines you take. How can you care for yourself at home? Get screening tests that you and your doctor decide on. Screening helps find diseases before any symptoms appear. Eat healthy foods. Choose fruits, vegetables, whole grains, protein, and low-fat dairy foods. Limit fat, especially saturated fat. Reduce salt in your diet. Limit alcohol. If you are a man, have no more than 2 drinks a day or 14 drinks a week. If you are a woman, have no more than 1 drink a day or 7 drinks a week. Since alcohol affects older adults differently, you may want to limit alcohol even more. Or you may not want to drink at all. Get at least 30 minutes of exercise on most days of the week. Walking is a good choice. You also may want to do other activities, such as running, swimming, cycling, or playing tennis or team sports. Reach and stay at a healthy weight. This will lower your risk for many problems, such as obesity, diabetes, heart disease, and high blood pressure. Do not smoke. Smoking can make health problems worse. If you need help quitting, talk to your doctor about stop-smoking programs and medicines. These can increase your chances of quitting for good. Care for your mental health. It is easy to get weighed down by worry and stress. Learn strategies to manage stress, like deep breathing and mindfulness, and stay connected with your family and community. If you find you often feel sad or hopeless, talk with your doctor. Treatment can help.   Talk to your doctor about whether you have any risk factors for sexually transmitted infections (STIs). You can help prevent STIs if you wait to have sex with a new partner (or partners) until you've each been tested for STIs. It also helps if you use condoms (male or female condoms) and if you limit your sex partners to one person who only has sex with you. Vaccines are available for some STIs. If you think you may have a problem with alcohol or drug use, talk to your doctor. This includes prescription medicines (such as amphetamines and opioids) and illegal drugs (such as cocaine and methamphetamine). Your doctor can help you figure out what type of treatment is best for you. Protect your skin from too much sun. When you're outdoors from 10 a.m. to 4 p.m., stay in the shade or cover up with clothing and a hat with a wide brim. Wear sunglasses that block UV rays. Even when it's cloudy, put broad-spectrum sunscreen (SPF 30 or higher) on any exposed skin. See a dentist one or two times a year for checkups and to have your teeth cleaned. Wear a seat belt in the car. When should you call for help? Watch closely for changes in your health, and be sure to contact your doctor if you have any problems or symptoms that concern you. Where can you learn more? Go to https://Innovacisteveneb.[employer]partners. org and sign in to your [employer] account. Enter J623 in the [facility] box to learn more about \"Well Visit, Over 65: Care Instructions. \"     If you do not have an account, please click on the \"Sign Up Now\" link. Current as of: May 27, 2020               Content Version: 12.9  © 2689-6279 Healthwise, [employer]. Care instructions adapted under license by [facility] ([facility]). If you have questions about a medical condition or this instruction, always ask your healthcare professional. [address] any warranty or liability for your use of this information.

## 2021-09-13 NOTE — PROGRESS NOTES
Medicare Annual Wellness Visit  Name: Lori Pulido Date: 2021   MRN: [id number] Sex: Female   Age: 70 y.o. Ethnicity: Non- / Non    : 1950 Race: White (non-)      Evaristo Duke is here for Medicare AWV (4 month htn)    Screenings for behavioral, psychosocial and functional/safety risks, and cognitive dysfunction are all negative except as indicated below. These results, as well as other patient data from the [address] form, are documented in Flowsheets linked to this Encounter. Allergies   Allergen Reactions    Ciprofloxacin     Codeine     Lisinopril     Morphine     Norvasc [Amlodipine Besylate]     Penicillins     Sanctura [Trospium] Nausea Only     Bloated, nauseated    Xarelto [Rivaroxaban]          Prior to Visit Medications    Medication Sig Taking?  Authorizing Provider   Probiotic Product (PROBIOTIC-10 PO) Take by mouth Yes Historical Provider, MD   Omega-3 Fatty Acids (FISH OIL) 1000 MG CAPS Take 3,000 mg by mouth 3 times daily Yes Historical Provider, MD   aspirin 81 MG EC tablet Take 81 mg by mouth daily Yes Historical Provider, MD   cloNIDine (CATAPRES) 0.3 MG tablet TAKE ONE TABLET BY MOUTH ONCE NIGHTLY Yes Genesis Bain MD   spironolactone (ALDACTONE) 50 MG tablet TAKE ONE TABLET BY MOUTH LUNCH Yes Genesis Bain MD   simvastatin (ZOCOR) 20 MG tablet TAKE ONE TABLET BY MOUTH DAILY Yes Genessi Bain MD   Calcium Citrate-Vitamin D (CALCIUM CITRATE + D PO) Take 1 tablet by mouth daily Yes Historical Provider, MD   Calcium Polycarbophil (FIBER-CAPS PO) Take 5 capsules by mouth daily Yes Historical Provider, MD   esomeprazole (NEXIUM) 20 MG delayed release capsule Take 2 capsules by mouth daily Yes Genesis Bain MD   oxybutynin (DITROPAN) 5 MG tablet Take 5 mg by mouth 2 times daily Yes Historical Provider, MD   Multiple Vitamin TABS Take 1 tablet by mouth daily Yes Historical Provider, MD   Ascorbic Acid (VITAMIN C) 500 MG tablet Take 500 mg by mouth daily Indications: take as directed Yes Historical Provider, MD   Vitamin E 400 UNITS TABS Take 400 Units by mouth daily  Yes Historical Provider, MD   clindamycin (CLEOCIN) 300 MG capsule Take 1 capsule by mouth See Admin Instructions Take 2 caps 1 hr prior to dental maida't then 1 cap twice daily until gone  Patient not taking: Reported on 9/13/2021  Maia Espnial MD         Past Medical History:   Diagnosis Date    Arthritis     Asthma     \"winter\" asthma    Bronchitis     Constipation     GERD (gastroesophageal reflux disease)     Hyperlipidemia     Hypertension     Hypokalemia     Overactive bladder        Past Surgical History:   Procedure Laterality Date   [address] and 1992    neck and lower back    BLADDER SURGERY  2006, 2008    2 surgeries: sling surgery first then another sling and tack bladder up surgery    CATARACT REMOVAL WITH IMPLANT Bilateral     COLONOSCOPY  2015    had polyp, done at [facility]. Dr Libia Juan 8/21/2018    COLONOSCOPY POLYPECTOMY /COLD BX  performed by Ann Rapp MD at [address] [place] [address]  02/2017    upper rt back.  CO KNEE SCOPE,DIAGNOSTIC Right 10/4/2018    KNEE ARTHROSCOPY FOR PARTIAL, MEDIAL MENISCECTOMY.  performed by Maia Espinal MD at [facility] Bilateral 2013, 2015    Dr Pedro Maharaj History   Problem Relation Age of Onset    Hypertension Mother     Fainting Mother     Hypertension Sister     Heart Disease Maternal Grandmother        CareTeam (Including outside providers/suppliers regularly involved in providing care):   Patient Care Team:  Kayla Coronado MD as PCP - General (Family Medicine)  Kayla Coronado MD as PCP - [facility] Empaneled Provider    Wt Readings from Last 3 Encounters:   09/13/21 213 lb 9.6 oz (96.9 kg)   05/10/21 211 lb 12.8 oz (96.1 kg)   08/18/20 197 lb 12.8 oz (89.7 kg)     Vitals:    09/13/21 1419   BP: 114/76 Pulse: 81   SpO2: 97%   Weight: 213 lb 9.6 oz (96.9 kg)   Height: 5' 4\" (1.626 m)     Body mass index is 36.66 kg/m². Based upon direct observation of the patient, evaluation of cognition reveals recent and remote memory intact. Physical Exam  Vitals and nursing note reviewed. Constitutional:       General: She is not in acute distress. Appearance: She is well-developed. She is not ill-appearing. HENT:      Head: Normocephalic and atraumatic. Right Ear: External ear normal.      Left Ear: External ear normal.   Eyes:      General: No scleral icterus. Right eye: No discharge. Left eye: No discharge. Conjunctiva/sclera: Conjunctivae normal.      Pupils: Pupils are equal, round, and reactive to light. Neck:      Thyroid: No thyromegaly. Trachea: No tracheal deviation. Cardiovascular:      Rate and Rhythm: Normal rate and regular rhythm. Heart sounds: Normal heart sounds. Pulmonary:      Effort: Pulmonary effort is normal. No respiratory distress. Breath sounds: Normal breath sounds. No wheezing. Lymphadenopathy:      Cervical: No cervical adenopathy. Skin:     General: Skin is warm. Findings: No rash. Neurological:      Mental Status: She is alert and oriented to person, place, and time. Psychiatric:         Mood and Affect: Mood normal.         Behavior: Behavior normal.         Thought Content: Thought content normal.          Patient's complete Health Risk Assessment and screening values have been reviewed and are found in Flowsheets. The following problems were reviewed today and where indicated follow up appointments were made and/or referrals ordered. Positive Risk Factor Screenings with Interventions:            General Health and ACP:  General  In general, how would you say your health is?: Very Good  In the past 7 days, have you experienced any of the following?  New or Increased Pain, New or Increased Fatigue, Loneliness, Social 08/21/2028    DEXA (modify frequency per FRAX score)  Completed    Pneumococcal 65+ years Vaccine  Completed    COVID-19 Vaccine  Completed    Hepatitis C screen  Completed    Hepatitis A vaccine  Aged Out    Hepatitis B vaccine  Aged Out    Hib vaccine  Aged Out    Meningococcal (ACWY) vaccine  Aged Out     Recommendations for [place] Due: see orders and patient instructions/AVS.  . Recommended screening schedule for the next 5-10 years is provided to the patient in written form: see Patient Instructions/AVS.    [facility] [place] was seen today for medicare awv. Diagnoses and all orders for this visit:    Routine general medical examination at a health care facility    Essential hypertension  -     Comprehensive Metabolic Panel, Fasting; Future  -     Lipid Panel; Future    Mixed hyperlipidemia  -     Comprehensive Metabolic Panel, Fasting; Future  -     Lipid Panel;  Future    Need for vaccination  -     INFLUENZA, QUADV, ADJUVANTED, 72 YRS =, IM, PF, PREFILL SYR, 0.5ML (FLUAD)    Routine general medical examination at a health care facility

## 2021-09-29 ENCOUNTER — HOSPITAL ENCOUNTER (OUTPATIENT)
Age: 71
Setting detail: SPECIMEN
Discharge: HOME | End: 2021-09-29
Payer: MEDICARE

## 2021-09-29 ENCOUNTER — HOSPITAL ENCOUNTER (OUTPATIENT)
Dept: PREADMISSION TESTING | Age: 71
Discharge: HOME | End: 2021-10-03
Payer: MEDICARE

## 2021-09-29 VITALS
BODY MASS INDEX: 36.02 KG/M2 | TEMPERATURE: 97.8 F | OXYGEN SATURATION: 95 % | DIASTOLIC BLOOD PRESSURE: 88 MMHG | RESPIRATION RATE: 16 BRPM | HEIGHT: 64 IN | HEART RATE: 77 BPM | WEIGHT: 211 LBS | SYSTOLIC BLOOD PRESSURE: 136 MMHG

## 2021-09-29 DIAGNOSIS — E78.2 MIXED HYPERLIPIDEMIA: ICD-10-CM

## 2021-09-29 DIAGNOSIS — I10 ESSENTIAL HYPERTENSION: ICD-10-CM

## 2021-09-29 LAB
ALBUMIN SERPL-MCNC: 4.4 G/DL (ref 3.5–5.2)
ALBUMIN/GLOBULIN RATIO: 1.4 (ref 1–2.5)
ALP BLD-CCNC: 89 U/L (ref 35–104)
ALT SERPL-CCNC: 16 U/L (ref 5–33)
ANION GAP SERPL CALCULATED.3IONS-SCNC: 12 MMOL/L (ref 9–17)
AST SERPL-CCNC: 18 U/L
BILIRUB SERPL-MCNC: 0.48 MG/DL (ref 0.3–1.2)
BUN BLDV-MCNC: 18 MG/DL (ref 8–23)
BUN/CREAT BLD: ABNORMAL (ref 9–20)
CALCIUM SERPL-MCNC: 9.6 MG/DL (ref 8.6–10.4)
CHLORIDE BLD-SCNC: 106 MMOL/L (ref 98–107)
CO2: 24 MMOL/L (ref 20–31)
CREAT SERPL-MCNC: 1.11 MG/DL (ref 0.5–0.9)
GFR AFRICAN AMERICAN: 59 ML/MIN
GFR NON-AFRICAN AMERICAN: 48 ML/MIN
GFR SERPL CREATININE-BSD FRML MDRD: ABNORMAL ML/MIN/{1.73_M2}
GFR SERPL CREATININE-BSD FRML MDRD: ABNORMAL ML/MIN/{1.73_M2}
GLUCOSE FASTING: 92 MG/DL (ref 70–99)
HCT VFR BLD CALC: 45.7 % (ref 36.3–47.1)
HEMOGLOBIN: 14.8 G/DL (ref 11.9–15.1)
MCH RBC QN AUTO: 28.8 PG (ref 25.2–33.5)
MCHC RBC AUTO-ENTMCNC: 32.4 G/DL (ref 28.4–34.8)
MCV RBC AUTO: 88.9 FL (ref 82.6–102.9)
NRBC AUTOMATED: 0 PER 100 WBC
PDW BLD-RTO: 13 % (ref 11.8–14.4)
PLATELET # BLD: 266 K/UL (ref 138–453)
PMV BLD AUTO: 10 FL (ref 8.1–13.5)
POTASSIUM SERPL-SCNC: 4.9 MMOL/L (ref 3.7–5.3)
RBC # BLD: 5.14 M/UL (ref 3.95–5.11)
SODIUM BLD-SCNC: 142 MMOL/L (ref 135–144)
TOTAL PROTEIN: 7.5 G/DL (ref 6.4–8.3)
WBC # BLD: 7.3 K/UL (ref 3.5–11.3)

## 2021-09-29 PROCEDURE — 93005 ELECTROCARDIOGRAM TRACING: CPT | Performed by: OBSTETRICS & GYNECOLOGY

## 2021-09-29 RX ORDER — SIMVASTATIN 20 MG
TABLET ORAL
Qty: 90 TABLET | Refills: 0 | Status: SHIPPED | OUTPATIENT
Start: 2021-09-29 | End: 2022-01-10 | Stop reason: SDUPTHER

## 2021-09-29 RX ORDER — SPIRONOLACTONE 50 MG/1
TABLET, FILM COATED ORAL
Qty: 90 TABLET | Refills: 0 | Status: SHIPPED | OUTPATIENT
Start: 2021-09-29 | End: 2022-01-04 | Stop reason: SDUPTHER

## 2021-09-29 RX ORDER — ACETAMINOPHEN 160 MG
TABLET,DISINTEGRATING ORAL
COMMUNITY

## 2021-09-29 RX ORDER — CLINDAMYCIN PHOSPHATE 300 MG/50ML
300 INJECTION INTRAVENOUS ONCE
Status: CANCELLED | OUTPATIENT
Start: 2021-10-11

## 2021-09-29 RX ORDER — GENTAMICIN SULFATE 80 MG/50ML
80 INJECTION, SOLUTION INTRAVENOUS ONCE
Status: CANCELLED | OUTPATIENT
Start: 2021-10-11

## 2021-09-30 LAB
EKG ATRIAL RATE: 68 BPM
EKG P AXIS: 40 DEGREES
EKG P-R INTERVAL: 160 MS
EKG Q-T INTERVAL: 414 MS
EKG QRS DURATION: 82 MS
EKG QTC CALCULATION (BAZETT): 440 MS
EKG R AXIS: 21 DEGREES
EKG T AXIS: 48 DEGREES
EKG VENTRICULAR RATE: 68 BPM

## 2021-10-06 ENCOUNTER — ANESTHESIA EVENT (OUTPATIENT)
Dept: OPERATING ROOM | Age: 71
End: 2021-10-06
Payer: MEDICARE

## 2021-10-07 NOTE — H&P
OB/GYN Pre-Op H&P  [facility]    Patient Name: Efe Bhakta     Patient : 1950  Room/Bed: [facility] [place] RM/NONE  Admission Date/Time: 10/11/2021  5:42 AM  Primary Care Physician: Sumit Kirkland MD  MRN: [id number]    Date: 10/11/2021  Time: 7:00 AM    The patient was seen in pre-op holding. She is here for surgical management of urinary incontinence via placement of axonic's stimulator. The procedure risks and complications were reviewed. The labs, Consent, and H&P were reviewed and updated. The patient was counseled on the possibility of  the need of a second surgery. The patient voiced understanding and had all of her questions answered. The possibility of incomplete removal of abnormal tissue was discussed. OBSTETRICAL HISTORY:   OB History   No obstetric history on file. PAST MEDICAL HISTORY:   has a past medical history of Arthritis, Asthma, Bronchitis, Constipation, GERD (gastroesophageal reflux disease), Hyperlipidemia, Hypertension, Hypokalemia, and Overactive bladder. PAST SURGICAL HISTORY:   has a past surgical history that includes back surgery ( and ); Bladder surgery (, ); Tubal ligation; Total hip arthroplasty (Bilateral, , ); cyst removal (2017); Colonoscopy (); Colonoscopy (N/A, 2018); Cataract removal with implant (Bilateral); and pr knee scope,diagnostic (Right, 10/4/2018).     ALLERGIES:  Allergies as of 2021 - Review Complete 05/10/2021   Allergen Reaction Noted    Ciprofloxacin  2015    Codeine  2015    Lisinopril  2015    Morphine  2015    Norvasc [amlodipine besylate]  2015    Penicillins  2015    Sanctura [trospium] Nausea Only 2016    Xarelto [rivaroxaban]  2015       MEDICATIONS:  Current Facility-Administered Medications   Medication Dose Route Frequency Provider Last Rate Last Admin    0.9 % sodium chloride infusion   IntraVENous Continuous Edilma Pia Reid MD        lactated ringers infusion   IntraVENous Continuous Jana Calero MD        sodium chloride flush 0.9 % injection 10 mL  10 mL IntraVENous 2 times per day Jana Calero MD        sodium chloride flush 0.9 % injection 10 mL  10 mL IntraVENous PRN Jana Calero MD        0.9 % sodium chloride infusion  25 mL IntraVENous PRN Jana Calero MD        clindamycin (CLEOCIN) IVPB 300 mg  300 mg IntraVENous Once Chip Macias DO        gentamicin (GARAMYCIN) IVPB 80 mg  80 mg IntraVENous Once Tian Chen DO           FAMILY HISTORY:  family history includes Fainting in her mother; Heart Disease in her maternal grandmother; Hypertension in her mother and sister. SOCIAL HISTORY:   reports that she has never smoked. She has never used smokeless tobacco. She reports current alcohol use. She reports that she does not use drugs.     VITALS:  Vitals:    10/11/21 0654 10/11/21 0706   BP:  (!) 170/93   Pulse:  73   Resp:  18   Temp:  97.7 °F (36.5 °C)   TempSrc:  Infrared   SpO2:  97%   Weight: 214 lb (97.1 kg)    Height: 5' 4\" (1.626 m)                                                                                                                                PHYSICAL EXAM:     Unchanged from Prior H&P  CONSTITUTIONAL:  Alert and oriented, no acute distress  HEAD: normocephalic, atraumatic  EYES: Pupils equal and reactive to light, Extraocular muscles intact, sclera non icteric  ENT: Mucus membranes moist, No otorrhea, no rhinorrhea  NECK:  supple, symmetrical, trachea midline   LUNGS:  Good air movement bilaterally, unlabored respirations, no wheezes or rhonchi  CARDIOVASCULAR: Regular rate and rhythm, no murmurs rubs or gallops  ABDOMEN: soft, non tender, non distended, no rebound or guarding, no hernias, no hepatomegaly, no splenomegly  MUSCULOSKELETAL:  Equal strength bilaterally, normal muscle tone  SKIN: No abscess or rash  NEUROLOGIC:  Cranial nerves 2-12 grossly intact, no focal deficits  PSYCH: affect appropriate  Pelvic Exam: deferred to OR      LAB RESULTS:  Admission on 10/11/2021   Component Date Value Ref Range Status    Specimen Description 10/11/2021 . NASOPHARYNGEAL SWAB   Final    SARS-CoV-2, Rapid 10/11/2021 Not Detected  Not Detected Final    Comment:       Rapid NAAT:  The specimen is NEGATIVE for SARS-CoV-2, the novel coronavirus associated with   COVID-19. The ID NOW COVID-19 assay is designed to detect the virus that causes COVID-19 in patients   with signs and symptoms of infection who are suspected of COVID-19. An individual without symptoms of COVID-19 and who is not shedding SARS-CoV-2 virus would   expect to have a negative (not detected) result in this assay. Negative results should be treated as presumptive and, if inconsistent with clinical signs   and symptoms or necessary for patient management,  should be tested with an alternative molecular assay. Negative results do not preclude   SARS-CoV-2 infection and   should not be used as the sole basis for patient management decisions.          Fact sheet for Healthcare Providers: Quoc  Fact sheet for Patients: Quoc          Methodology: Isothermal Nucleic Acid Amplification     Hospital Outpatient Visit on 09/29/2021   Component Date Value Ref Range Status    Ventricular Rate 09/29/2021 68  BPM Final    Atrial Rate 09/29/2021 68  BPM Final    P-R Interval 09/29/2021 160  ms Final    QRS Duration 09/29/2021 82  ms Final    Q-T Interval 09/29/2021 414  ms Final    QTc Calculation (Bazett) 09/29/2021 440  ms Final    P Axis 09/29/2021 40  degrees Final    R Axis 09/29/2021 21  degrees Final    T Axis 09/29/2021 48  degrees Final   Hospital Outpatient Visit on 09/29/2021   Component Date Value Ref Range Status    WBC 09/29/2021 7.3  3.5 - 11.3 k/uL Final    RBC 09/29/2021 5.14* 3.95 - 5.11 m/uL Final    Hemoglobin 09/29/2021 14.8  11.9 - 15.1 g/dL Final  Hematocrit 09/29/2021 45.7  36.3 - 47.1 % Final    MCV 09/29/2021 88.9  82.6 - 102.9 fL Final    MCH 09/29/2021 28.8  25.2 - 33.5 pg Final    MCHC 09/29/2021 32.4  28.4 - 34.8 g/dL Final    RDW 09/29/2021 13.0  11.8 - 14.4 % Final    Platelets 45/17/1426 266  138 - 453 k/uL Final    MPV 09/29/2021 10.0  8.1 - 13.5 fL Final    NRBC Automated 09/29/2021 0.0  0.0 per 100 WBC Final   Hospital Outpatient Visit on 09/29/2021   Component Date Value Ref Range Status    Glucose, Fasting 09/29/2021 92  70 - 99 mg/dL Final    BUN 09/29/2021 18  8 - 23 mg/dL Final    CREATININE 09/29/2021 1.11* 0.50 - 0.90 mg/dL Final    Bun/Cre Ratio 09/29/2021 NOT REPORTED  9 - 20 Final    Calcium 09/29/2021 9.6  8.6 - 10.4 mg/dL Final    Sodium 09/29/2021 142  135 - 144 mmol/L Final    Potassium 09/29/2021 4.9  3.7 - 5.3 mmol/L Final    Chloride 09/29/2021 106  98 - 107 mmol/L Final    CO2 09/29/2021 24  20 - 31 mmol/L Final    Anion Gap 09/29/2021 12  9 - 17 mmol/L Final    Alkaline Phosphatase 09/29/2021 89  35 - 104 U/L Final    ALT 09/29/2021 16  5 - 33 U/L Final    AST 09/29/2021 18  <32 U/L Final    Total Bilirubin 09/29/2021 0.48  0.3 - 1.2 mg/dL Final    Total Protein 09/29/2021 7.5  6.4 - 8.3 g/dL Final    Albumin 09/29/2021 4.4  3.5 - 5.2 g/dL Final    Albumin/Globulin Ratio 09/29/2021 1.4  1.0 - 2.5 Final    GFR Non- 09/29/2021 48* >60 mL/min Final    GFR  09/29/2021 59* >60 mL/min Final    GFR Comment 09/29/2021        Final    Comment: Average GFR for 79or more years old:   76 mL/min/1.73sq m  Chronic Kidney Disease:   <60 mL/min/1.73sq m  Kidney failure:   <15 mL/min/1.73sq m              eGFR calculated using average adult body mass. Additional eGFR calculator available at:        [email or web address]            GFR Staging 09/29/2021 NOT REPORTED   Final       DIAGNOSTICS:    No results found. DIAGNOSIS & PLAN:  1.  Urinary Incontinence   - Proceed with planned procedure: Axonics Stimulator Insertion Stage 1   - Consent signed, on chart. - The patient is ready for transport to the operative suite. Counseling: The patient was counseled on all options both medical and surgical, conservative as well as definitive. She has elected to proceed with the procedure as stated above. The patient was counseled on the procedure. Risks and complications were reviewed in detail. The patients orders, labs, consents have been completed. The history and physical as well as all supporting surgical documentation will be forwarded to the pre-operative holding area. The patient is aware that this procedure may not alleviate her symptoms. That there may be a necessity for a second surgery and that there may be an incomplete removal of abnormal tissue.     Shaylee Hallman DO  Ob/Gyn Resident   [facility]  10/11/2021, 7:00 AM

## 2021-10-07 NOTE — DISCHARGE SUMMARY
Gyn Discharge Summary  [facility]      Patient Name: Barak Shay  Patient : 1950  Primary Care Physician: Meliton Otero MD  Admit Date: 10/11/2021    Principal Diagnosis: Urinary Incontinence     Other Diagnosis:   URINARY INCONTINENCE  Patient Active Problem List   Diagnosis    Asthma    Cervical disc disease    Esophageal reflux    Fatigue    Hip joint stiffness    Hyperlipidemia    Essential hypertension    Hypokalemia    Joint pain, hip    Lower back pain    Muscle spasm    Nasal congestion    Neck pain    Osteoarthritis of hip    Osteoarthritis of neck    Sacroiliac strain    Scoliosis    Shortness of breath    Snoring    Tachycardia    Urinary incontinence    Weight gain    Acute internal derangement of right knee    BMI 36.0-36.9,adult       Infection: No  Hospital Acquired: No    Surgical Operations & Procedures: axonics stimulator insertion stage 1    Consultations: none and Anesthesia    Pertinent Findings & Procedures:   Barak Shay is a 70 y.o. female   admitted for sugical management of urinary incontinence. She underwent axonics stimulator insertion stage 1 on 10/11/21. Post-op course normal, discharged home on POD#0. She was discharged home with Keflex. Follow up in 1-2 weeks. Discharge instructions reviewed and questions answered.     Course of patient: normal    Discharge to: Home    Readmission planned: No    Recommendations on Discharge:     Medications:   Kandy Roberto   Home Medication Instructions KXY:[id number]    Printed on:10/11/21 1147   Medication Information                      Ascorbic Acid (VITAMIN C) 500 MG tablet  Take 500 mg by mouth daily Indications: take as directed             aspirin 81 MG EC tablet  Take 81 mg by mouth daily             Calcium Citrate-Vitamin D (CALCIUM CITRATE + D PO)  Take 1 tablet by mouth daily             Calcium Polycarbophil (FIBER-CAPS PO)  Take 5 capsules by mouth daily cephALEXin (KEFLEX) 500 MG capsule  Take 1 capsule by mouth 2 times daily for 5 days             Cholecalciferol (VITAMIN D3) 50 MCG (2000 UT) CAPS  Take by mouth 2 tablets daily             clindamycin (CLEOCIN) 300 MG capsule  Take 1 capsule by mouth See Admin Instructions Take 2 caps 1 hr prior to dental maida't then 1 cap twice daily until gone             cloNIDine (CATAPRES) 0.3 MG tablet  TAKE ONE TABLET BY MOUTH ONCE NIGHTLY             esomeprazole (NEXIUM) 20 MG delayed release capsule  Take 2 capsules by mouth daily             Multiple Vitamin TABS  Take 1 tablet by mouth daily             Omega-3 Fatty Acids (FISH OIL) 1000 MG CAPS  Take 3,000 mg by mouth 3 times daily             oxybutynin (DITROPAN) 5 MG tablet  Take 5 mg by mouth 2 times daily             Probiotic Product (PROBIOTIC-10 PO)  Take by mouth             simvastatin (ZOCOR) 20 MG tablet  TAKE ONE TABLET BY MOUTH DAILY             spironolactone (ALDACTONE) 50 MG tablet  TAKE ONE TABLET BY MOUTH DAILY WITH LUNCH             Vitamin E 400 UNITS TABS  Take 400 Units by mouth daily                Activity: pelvic rest x 2 weeks, no lifting greater than 15 lbs  Diet: regular diet  Follow up: 1-2 weeks     Condition on discharge: good and stable   Discharge Date: 10/11/21     Comments:  Home care, Follow-up care, restrictions reviewed.     Laurent Kang DO  Ob/Gyn Resident  [facility]  10/11/2021, 11:47 AM

## 2021-10-11 ENCOUNTER — ANESTHESIA (OUTPATIENT)
Dept: OPERATING ROOM | Age: 71
End: 2021-10-11
Payer: MEDICARE

## 2021-10-11 ENCOUNTER — HOSPITAL ENCOUNTER (OUTPATIENT)
Age: 71
Setting detail: OUTPATIENT SURGERY
Discharge: HOME | End: 2021-10-11
Attending: OBSTETRICS & GYNECOLOGY | Admitting: OBSTETRICS & GYNECOLOGY
Payer: MEDICARE

## 2021-10-11 ENCOUNTER — APPOINTMENT (OUTPATIENT)
Dept: GENERAL RADIOLOGY | Age: 71
End: 2021-10-11
Attending: OBSTETRICS & GYNECOLOGY
Payer: MEDICARE

## 2021-10-11 VITALS
RESPIRATION RATE: 13 BRPM | OXYGEN SATURATION: 90 % | TEMPERATURE: 89.6 F | SYSTOLIC BLOOD PRESSURE: 143 MMHG | DIASTOLIC BLOOD PRESSURE: 76 MMHG

## 2021-10-11 VITALS
SYSTOLIC BLOOD PRESSURE: 132 MMHG | OXYGEN SATURATION: 97 % | BODY MASS INDEX: 36.54 KG/M2 | TEMPERATURE: 97.1 F | WEIGHT: 214 LBS | DIASTOLIC BLOOD PRESSURE: 81 MMHG | RESPIRATION RATE: 20 BRPM | HEART RATE: 67 BPM | HEIGHT: 64 IN

## 2021-10-11 LAB
SARS-COV-2, RAPID: NOT DETECTED
SPECIMEN DESCRIPTION: NORMAL

## 2021-10-11 PROCEDURE — 7100000010 HC PHASE II RECOVERY - FIRST 15 MIN: Performed by: OBSTETRICS & GYNECOLOGY

## 2021-10-11 PROCEDURE — 3600000002 HC SURGERY LEVEL 2 BASE: Performed by: OBSTETRICS & GYNECOLOGY

## 2021-10-11 PROCEDURE — 7100000000 HC PACU RECOVERY - FIRST 15 MIN: Performed by: OBSTETRICS & GYNECOLOGY

## 2021-10-11 PROCEDURE — C1787 PATIENT PROGR, NEUROSTIM: HCPCS | Performed by: OBSTETRICS & GYNECOLOGY

## 2021-10-11 PROCEDURE — 3700000000 HC ANESTHESIA ATTENDED CARE: Performed by: OBSTETRICS & GYNECOLOGY

## 2021-10-11 PROCEDURE — 2500000003 HC RX 250 WO HCPCS: Performed by: OBSTETRICS & GYNECOLOGY

## 2021-10-11 PROCEDURE — 3209999900 FLUORO FOR SURGICAL PROCEDURES

## 2021-10-11 PROCEDURE — 87635 SARS-COV-2 COVID-19 AMP PRB: CPT

## 2021-10-11 PROCEDURE — 7100000011 HC PHASE II RECOVERY - ADDTL 15 MIN: Performed by: OBSTETRICS & GYNECOLOGY

## 2021-10-11 PROCEDURE — 6360000002 HC RX W HCPCS: Performed by: OBSTETRICS & GYNECOLOGY

## 2021-10-11 PROCEDURE — 6360000002 HC RX W HCPCS: Performed by: NURSE ANESTHETIST, CERTIFIED REGISTERED

## 2021-10-11 PROCEDURE — 2500000003 HC RX 250 WO HCPCS: Performed by: NURSE ANESTHETIST, CERTIFIED REGISTERED

## 2021-10-11 PROCEDURE — 2580000003 HC RX 258: Performed by: ANESTHESIOLOGY

## 2021-10-11 PROCEDURE — 7100000001 HC PACU RECOVERY - ADDTL 15 MIN: Performed by: OBSTETRICS & GYNECOLOGY

## 2021-10-11 PROCEDURE — 6370000000 HC RX 637 (ALT 250 FOR IP)

## 2021-10-11 PROCEDURE — 2709999900 HC NON-CHARGEABLE SUPPLY: Performed by: OBSTETRICS & GYNECOLOGY

## 2021-10-11 PROCEDURE — 3700000001 HC ADD 15 MINUTES (ANESTHESIA): Performed by: OBSTETRICS & GYNECOLOGY

## 2021-10-11 PROCEDURE — 3600000012 HC SURGERY LEVEL 2 ADDTL 15MIN: Performed by: OBSTETRICS & GYNECOLOGY

## 2021-10-11 PROCEDURE — C1778 LEAD, NEUROSTIMULATOR: HCPCS | Performed by: OBSTETRICS & GYNECOLOGY

## 2021-10-11 PROCEDURE — 6360000002 HC RX W HCPCS: Performed by: ANESTHESIOLOGY

## 2021-10-11 DEVICE — TINED LEAD KIT
Type: IMPLANTABLE DEVICE | Site: BACK | Status: FUNCTIONAL
Brand: AXONICS

## 2021-10-11 DEVICE — LEAD IMPLANT KIT
Type: IMPLANTABLE DEVICE | Site: BACK | Status: FUNCTIONAL
Brand: AXONICS

## 2021-10-11 RX ORDER — NEOSTIGMINE METHYLSULFATE 5 MG/5 ML
SYRINGE (ML) INTRAVENOUS PRN
Status: DISCONTINUED | OUTPATIENT
Start: 2021-10-11 | End: 2021-10-11 | Stop reason: SDUPTHER

## 2021-10-11 RX ORDER — OXYCODONE HYDROCHLORIDE AND ACETAMINOPHEN 5; 325 MG/1; MG/1
TABLET ORAL
Status: COMPLETED
Start: 2021-10-11 | End: 2021-10-11

## 2021-10-11 RX ORDER — PROPOFOL 10 MG/ML
INJECTION, EMULSION INTRAVENOUS PRN
Status: DISCONTINUED | OUTPATIENT
Start: 2021-10-11 | End: 2021-10-11 | Stop reason: SDUPTHER

## 2021-10-11 RX ORDER — 0.9 % SODIUM CHLORIDE 0.9 %
500 INTRAVENOUS SOLUTION INTRAVENOUS
Status: DISCONTINUED | OUTPATIENT
Start: 2021-10-11 | End: 2021-10-11 | Stop reason: HOSPADM

## 2021-10-11 RX ORDER — OXYCODONE HYDROCHLORIDE AND ACETAMINOPHEN 5; 325 MG/1; MG/1
2 TABLET ORAL PRN
Status: COMPLETED | OUTPATIENT
Start: 2021-10-11 | End: 2021-10-11

## 2021-10-11 RX ORDER — ONDANSETRON 2 MG/ML
INJECTION INTRAMUSCULAR; INTRAVENOUS
Status: DISCONTINUED
Start: 2021-10-11 | End: 2021-10-11 | Stop reason: HOSPADM

## 2021-10-11 RX ORDER — GLYCOPYRROLATE 1 MG/5 ML
SYRINGE (ML) INTRAVENOUS PRN
Status: DISCONTINUED | OUTPATIENT
Start: 2021-10-11 | End: 2021-10-11 | Stop reason: SDUPTHER

## 2021-10-11 RX ORDER — HYDRALAZINE HYDROCHLORIDE 20 MG/ML
10 INJECTION INTRAMUSCULAR; INTRAVENOUS EVERY 10 MIN PRN
Status: DISCONTINUED | OUTPATIENT
Start: 2021-10-11 | End: 2021-10-11 | Stop reason: HOSPADM

## 2021-10-11 RX ORDER — DEXAMETHASONE SODIUM PHOSPHATE 10 MG/ML
INJECTION INTRAMUSCULAR; INTRAVENOUS PRN
Status: DISCONTINUED | OUTPATIENT
Start: 2021-10-11 | End: 2021-10-11 | Stop reason: SDUPTHER

## 2021-10-11 RX ORDER — MIDAZOLAM HYDROCHLORIDE 2 MG/2ML
1 INJECTION, SOLUTION INTRAMUSCULAR; INTRAVENOUS ONCE
Status: DISCONTINUED | OUTPATIENT
Start: 2021-10-11 | End: 2021-10-11 | Stop reason: HOSPADM

## 2021-10-11 RX ORDER — GENTAMICIN IN NACL, ISO-OSM 100MG/0.1L
80 INTRAVENOUS SOLUTION, PIGGYBACK (ML) INTRAVENOUS ONCE
Status: COMPLETED | OUTPATIENT
Start: 2021-10-11 | End: 2021-10-11

## 2021-10-11 RX ORDER — DIPHENHYDRAMINE HYDROCHLORIDE 50 MG/ML
12.5 INJECTION INTRAMUSCULAR; INTRAVENOUS
Status: DISCONTINUED | OUTPATIENT
Start: 2021-10-11 | End: 2021-10-11 | Stop reason: HOSPADM

## 2021-10-11 RX ORDER — SODIUM CHLORIDE 9 MG/ML
INJECTION, SOLUTION INTRAVENOUS CONTINUOUS
Status: DISCONTINUED | OUTPATIENT
Start: 2021-10-11 | End: 2021-10-11 | Stop reason: HOSPADM

## 2021-10-11 RX ORDER — MEPERIDINE HYDROCHLORIDE 50 MG/ML
12.5 INJECTION INTRAMUSCULAR; INTRAVENOUS; SUBCUTANEOUS EVERY 5 MIN PRN
Status: DISCONTINUED | OUTPATIENT
Start: 2021-10-11 | End: 2021-10-11 | Stop reason: HOSPADM

## 2021-10-11 RX ORDER — PROMETHAZINE HYDROCHLORIDE 25 MG/ML
6.25 INJECTION, SOLUTION INTRAMUSCULAR; INTRAVENOUS
Status: DISCONTINUED | OUTPATIENT
Start: 2021-10-11 | End: 2021-10-11 | Stop reason: HOSPADM

## 2021-10-11 RX ORDER — SODIUM CHLORIDE 9 MG/ML
25 INJECTION, SOLUTION INTRAVENOUS PRN
Status: DISCONTINUED | OUTPATIENT
Start: 2021-10-11 | End: 2021-10-11 | Stop reason: HOSPADM

## 2021-10-11 RX ORDER — SODIUM CHLORIDE 0.9 % (FLUSH) 0.9 %
10 SYRINGE (ML) INJECTION PRN
Status: DISCONTINUED | OUTPATIENT
Start: 2021-10-11 | End: 2021-10-11 | Stop reason: HOSPADM

## 2021-10-11 RX ORDER — CLINDAMYCIN PHOSPHATE 300 MG/50ML
300 INJECTION INTRAVENOUS ONCE
Status: COMPLETED | OUTPATIENT
Start: 2021-10-11 | End: 2021-10-11

## 2021-10-11 RX ORDER — CLINDAMYCIN PHOSPHATE 300 MG/50ML
INJECTION INTRAVENOUS
Status: COMPLETED
Start: 2021-10-11 | End: 2021-10-11

## 2021-10-11 RX ORDER — LIDOCAINE HYDROCHLORIDE 10 MG/ML
INJECTION, SOLUTION EPIDURAL; INFILTRATION; INTRACAUDAL; PERINEURAL PRN
Status: DISCONTINUED | OUTPATIENT
Start: 2021-10-11 | End: 2021-10-11 | Stop reason: SDUPTHER

## 2021-10-11 RX ORDER — FENTANYL CITRATE 50 UG/ML
INJECTION, SOLUTION INTRAMUSCULAR; INTRAVENOUS PRN
Status: DISCONTINUED | OUTPATIENT
Start: 2021-10-11 | End: 2021-10-11 | Stop reason: SDUPTHER

## 2021-10-11 RX ORDER — SODIUM CHLORIDE, SODIUM LACTATE, POTASSIUM CHLORIDE, CALCIUM CHLORIDE 600; 310; 30; 20 MG/100ML; MG/100ML; MG/100ML; MG/100ML
INJECTION, SOLUTION INTRAVENOUS CONTINUOUS
Status: DISCONTINUED | OUTPATIENT
Start: 2021-10-11 | End: 2021-10-11 | Stop reason: HOSPADM

## 2021-10-11 RX ORDER — BUPIVACAINE HYDROCHLORIDE AND EPINEPHRINE 5; 5 MG/ML; UG/ML
INJECTION, SOLUTION PERINEURAL PRN
Status: DISCONTINUED | OUTPATIENT
Start: 2021-10-11 | End: 2021-10-11 | Stop reason: ALTCHOICE

## 2021-10-11 RX ORDER — BUPIVACAINE HYDROCHLORIDE AND EPINEPHRINE 5; 5 MG/ML; UG/ML
INJECTION, SOLUTION PERINEURAL
Status: DISCONTINUED
Start: 2021-10-11 | End: 2021-10-11 | Stop reason: HOSPADM

## 2021-10-11 RX ORDER — ROCURONIUM BROMIDE 10 MG/ML
INJECTION, SOLUTION INTRAVENOUS PRN
Status: DISCONTINUED | OUTPATIENT
Start: 2021-10-11 | End: 2021-10-11 | Stop reason: SDUPTHER

## 2021-10-11 RX ORDER — ONDANSETRON 2 MG/ML
INJECTION INTRAMUSCULAR; INTRAVENOUS PRN
Status: DISCONTINUED | OUTPATIENT
Start: 2021-10-11 | End: 2021-10-11 | Stop reason: SDUPTHER

## 2021-10-11 RX ORDER — LABETALOL 20 MG/4 ML (5 MG/ML) INTRAVENOUS SYRINGE
10 EVERY 10 MIN PRN
Status: DISCONTINUED | OUTPATIENT
Start: 2021-10-11 | End: 2021-10-11 | Stop reason: HOSPADM

## 2021-10-11 RX ORDER — OXYCODONE HYDROCHLORIDE AND ACETAMINOPHEN 5; 325 MG/1; MG/1
1 TABLET ORAL PRN
Status: COMPLETED | OUTPATIENT
Start: 2021-10-11 | End: 2021-10-11

## 2021-10-11 RX ORDER — CEPHALEXIN 500 MG/1
500 CAPSULE ORAL 2 TIMES DAILY
Qty: 10 CAPSULE | Refills: 0 | Status: SHIPPED | OUTPATIENT
Start: 2021-10-11 | End: 2021-10-16

## 2021-10-11 RX ORDER — SODIUM CHLORIDE 0.9 % (FLUSH) 0.9 %
10 SYRINGE (ML) INJECTION EVERY 12 HOURS SCHEDULED
Status: DISCONTINUED | OUTPATIENT
Start: 2021-10-11 | End: 2021-10-11 | Stop reason: HOSPADM

## 2021-10-11 RX ORDER — ONDANSETRON 2 MG/ML
4 INJECTION INTRAMUSCULAR; INTRAVENOUS
Status: COMPLETED | OUTPATIENT
Start: 2021-10-11 | End: 2021-10-11

## 2021-10-11 RX ADMIN — PROPOFOL INJECTABLE EMULSION 150 MG: 10 INJECTION, EMULSION INTRAVENOUS at 07:42

## 2021-10-11 RX ADMIN — LIDOCAINE HYDROCHLORIDE 50 MG: 10 INJECTION, SOLUTION EPIDURAL; INFILTRATION; INTRACAUDAL; PERINEURAL at 07:42

## 2021-10-11 RX ADMIN — SODIUM CHLORIDE, POTASSIUM CHLORIDE, SODIUM LACTATE AND CALCIUM CHLORIDE: 600; 310; 30; 20 INJECTION, SOLUTION INTRAVENOUS at 08:36

## 2021-10-11 RX ADMIN — GENTAMICIN SULFATE 80 MG: 100 INJECTION, SOLUTION INTRAVENOUS at 07:59

## 2021-10-11 RX ADMIN — Medication 3 MG: at 08:06

## 2021-10-11 RX ADMIN — DEXAMETHASONE SODIUM PHOSPHATE 5 MG: 10 INJECTION INTRAMUSCULAR; INTRAVENOUS at 07:54

## 2021-10-11 RX ADMIN — CLINDAMYCIN PHOSPHATE 300 MG: 300 INJECTION, SOLUTION INTRAVENOUS at 07:46

## 2021-10-11 RX ADMIN — ROCURONIUM BROMIDE 30 MG: 10 INJECTION INTRAVENOUS at 07:42

## 2021-10-11 RX ADMIN — OXYCODONE HYDROCHLORIDE AND ACETAMINOPHEN 2 TABLET: 5; 325 TABLET ORAL at 09:48

## 2021-10-11 RX ADMIN — SODIUM CHLORIDE, POTASSIUM CHLORIDE, SODIUM LACTATE AND CALCIUM CHLORIDE: 600; 310; 30; 20 INJECTION, SOLUTION INTRAVENOUS at 07:25

## 2021-10-11 RX ADMIN — ONDANSETRON 4 MG: 2 INJECTION, SOLUTION INTRAMUSCULAR; INTRAVENOUS at 07:54

## 2021-10-11 RX ADMIN — Medication 0.6 MG: at 08:06

## 2021-10-11 RX ADMIN — ONDANSETRON 4 MG: 2 INJECTION INTRAMUSCULAR; INTRAVENOUS at 09:24

## 2021-10-11 RX ADMIN — FENTANYL CITRATE 100 MCG: 50 INJECTION, SOLUTION INTRAMUSCULAR; INTRAVENOUS at 07:42

## 2021-10-11 ASSESSMENT — PULMONARY FUNCTION TESTS
PIF_VALUE: 22
PIF_VALUE: 24
PIF_VALUE: 21
PIF_VALUE: 4
PIF_VALUE: 21
PIF_VALUE: 23
PIF_VALUE: 22
PIF_VALUE: 22
PIF_VALUE: 24
PIF_VALUE: 24
PIF_VALUE: 21
PIF_VALUE: 21
PIF_VALUE: 22
PIF_VALUE: 22
PIF_VALUE: 24
PIF_VALUE: 22
PIF_VALUE: 24
PIF_VALUE: 22
PIF_VALUE: 22
PIF_VALUE: 24
PIF_VALUE: 22
PIF_VALUE: 3
PIF_VALUE: 24
PIF_VALUE: 1
PIF_VALUE: 24
PIF_VALUE: 22
PIF_VALUE: 5
PIF_VALUE: 22
PIF_VALUE: 22
PIF_VALUE: 20
PIF_VALUE: 21
PIF_VALUE: 22
PIF_VALUE: 23
PIF_VALUE: 22
PIF_VALUE: 21
PIF_VALUE: 22
PIF_VALUE: 22
PIF_VALUE: 24
PIF_VALUE: 21
PIF_VALUE: 22
PIF_VALUE: 25
PIF_VALUE: 24
PIF_VALUE: 22
PIF_VALUE: 22
PIF_VALUE: 24
PIF_VALUE: 22
PIF_VALUE: 2
PIF_VALUE: 21
PIF_VALUE: 22
PIF_VALUE: 21
PIF_VALUE: 22
PIF_VALUE: 24
PIF_VALUE: 15
PIF_VALUE: 21
PIF_VALUE: 20
PIF_VALUE: 21
PIF_VALUE: 22
PIF_VALUE: 24
PIF_VALUE: 3
PIF_VALUE: 22
PIF_VALUE: 23
PIF_VALUE: 21
PIF_VALUE: 21
PIF_VALUE: 2
PIF_VALUE: 24
PIF_VALUE: 22
PIF_VALUE: 24
PIF_VALUE: 21
PIF_VALUE: 24
PIF_VALUE: 24
PIF_VALUE: 26
PIF_VALUE: 24
PIF_VALUE: 21
PIF_VALUE: 2
PIF_VALUE: 24
PIF_VALUE: 22
PIF_VALUE: 18
PIF_VALUE: 24
PIF_VALUE: 22
PIF_VALUE: 24
PIF_VALUE: 22
PIF_VALUE: 24
PIF_VALUE: 24
PIF_VALUE: 22

## 2021-10-11 ASSESSMENT — PAIN - FUNCTIONAL ASSESSMENT: PAIN_FUNCTIONAL_ASSESSMENT: 0-10

## 2021-10-11 ASSESSMENT — PAIN SCALES - GENERAL
PAINLEVEL_OUTOF10: 10
PAINLEVEL_OUTOF10: 0

## 2021-10-11 NOTE — OP NOTE
[address]                  [facility] [address]                                OPERATIVE REPORT    PATIENT NAME: Nestor Richard                     :        1950  MED REC NO:   [id number]                             ROOM:  ACCOUNT NO:   [de-identified]                           ADMIT DATE: 10/11/2021  PROVIDER:     Mariann Beatty    DATE OF PROCEDURE:  10/11/2021    INDICATIONS FOR SURGERY:  Refractory urge urinary incontinence. PREOPERATIVE DIAGNOSIS:  Refractory urge urinary incontinence. POSTOPERATIVE DIAGNOSES:  Refractory urge urinary incontinence plus  osteoarthritis of the sacrum. PROCEDURE:  Axonics stage I temporary lead placement and external trial.    SURGEON:  Mariann Beatty DO    ASSISTANTS:  Roney Hammans, DO; Vidya Ureña DO    ANESTHESIA:  MAC. FINDINGS:  As above. SPECIMENS:  None. COMPLICATIONS:  None. BLOOD LOSS:  None. IMPLANTS:  Temporary lead placement. WOUND CLASS:  I, clean. CPT CODE:  90566. COURSE:  After surgical consent was reviewed with the patient including  risks, benefits, and alternatives, the patient expressed his desire to  proceed with surgery. She was placed in the sitting position, and  location for INS placement was identified and marked bilaterally 4 cm  below the posterior iliac crest such that the neurostimulator would sit  in the hollow of the ileum. The patient was asked to reach around with  her hand to ensure if there was comfortable location to place the  charging puck. The patient was then taken to the operating room and  placed in a prone position. Pillows were placed in the lower abdomen  and hips to level and flatten the sacrum and allow the toes to dangle  freely. IV antibiotics was infused 30 minutes prior to incision. Ground pad was placed at the patient's foot was connected to the  clinician  along with the test stimulation and J-hook.   Tape  was used to expose the buttocks and the anus for observation of the  nikita response. The patient was prepped and draped in the usual  manner and MAC anesthesia was induced. A surgical time-out was taken. Preoperative fluoroscopy was performed to visualize the sacral anatomy,  and attention was turned to placement of lead wire using the Kinopto  system at the level of S3 and the medial border of the sacral foramen  were identified bilaterally using fluoroscopy in AP view and lateral  view. After administration of local anesthesia, a ramos needle was  placed in the superior medial aspect of the S3 foramina on the patient's  right hand side. There was a lot of a bony prominence and  osteoarthritis and entry into the S3 foramina was rather difficult. Once confirmed into the appropriate place, the J-hook was then placed on  the uninsulated portion of the foramen needle, and stimulation was  applied up to level of 3.5 milliamps. There was no nikita or toe  flexion. Multiple passages in the area failed to reveal a positive  response. Therefore the left side was then utilized as option 2. The  left sided S3 foramina was eventually cannulated with the needle and  utilizing the J-hook with appropriate test stimulation, that a positive  nikita response and toe flexion was achieved at 0.8 mA and 1.5 mA  respectively. Once the ideal location was confirmed, a small incision  was made at the ramos needle to accommodate the lead wire and  tunneling tool. The directional guide was placed through the foramen  needle and the needle removed. Introducer was placed over the  directional guide and advanced under live fluoroscopy such as the  radiopaque marker was placed care home through the sacral plate. The  dilator was removed along with the directional guide.   Using live  fluoroscopy, the tined lead with the curved stylet was placed through  the introducer until the electrode 3 straddled the anterior surface of  the sacrum ensuring the lead had a gentle downward trajectory. Stimulation clip was placed on the distal lead and each electrode was  tested for motor response. After satisfactory lead positioning was  confirmed, the introducer was retracted and with the lead under  continuous fluoroscopy deploying the tines into the presacral tissue. Stimulation thresholds were established under the least amount of  stimulation required to obtain a motor response. A tunneling tool with  an overlying plastic sheath was inserted from the lead insertion site  subcutaneously to the location of INS pocket which had been previously  marked to be located in the hollow of the ileum. After administration  of local anesthetic 2.5-cm incision was made lateral to the site of the  INS placement to a depth no greater than 2 cm deep to the skin. Small  pocket was created to accommodate the lead in percutaneous extension  using combination of sharp and blunt dissection maintaining a depth of 2  cm. The tunneling tool was passed, the lead wire was removed and the  lead was fed through the sheath exiting at the pocket site. The sheath  was removed. The lead was cleaned and dried, and connected to the  [facility] percutaneous extension with a single set screw which was  tightened using a torque wrench. Percutaneous extension was tunneled to  the contralateral side and exited through a skin puncture site that had  been previously anesthetized. The percutaneous extension was then  connected the external pulse generator. Incisions were irrigated with  sterile saline and the INS incision was closed with 2-0 Vicryl suture,  3-0 Monocryl subcuticular and skin adhesive. Gauze was placed over the  incision along with transparent dressing. Instrument, sponge and needle  counts were correct x2, and the patient tolerated the procedure well. The  was updated personally regarding the outcome of the surgery.   The patient will go home on [facility] and Tylenol for discomfort and Keflex  to prevent infection. She will follow up in 1 week to assess a 50%  improvement in symptoms. This was done on the left hand side. Max Simon    D: 10/11/2021 9:54:25       T: 10/11/2021 10:01:05     [email or web address]  Job#: [id number]     Doc#: [id number]    CC:   Jermaine Lennon

## 2021-10-11 NOTE — ANESTHESIA POSTPROCEDURE EVALUATION
Department of Anesthesiology  Postprocedure Note    Patient: Tracy Raines  MRN: [id number]  YOB: 1950  Date of evaluation: 10/11/2021  Time:  10:07 AM     Procedure Summary     Date: 10/11/21 Room / Location: [address] [place] [address] [facility]    Anesthesia Start: 5867 Anesthesia Stop: 0370    Procedure: [address] (N/A ) Diagnosis: (URINARY INCONTINENCE)    Surgeons: Nicholas Mayer DO Responsible Provider: Sana Monson MD    Anesthesia Type: general ASA Status: 2          Anesthesia Type: general    Kevin Phase I: Kevin Score: 7    Kevin Phase II: Kevin Score: 10    Last vitals: Reviewed and per EMR flowsheets.        Anesthesia Post Evaluation    Patient location during evaluation: PACU  Patient participation: complete - patient participated  Level of consciousness: awake and alert  Airway patency: patent  Nausea & Vomiting: no nausea and no vomiting  Complications: no  Cardiovascular status: hemodynamically stable  Respiratory status: face mask and spontaneous ventilation  Hydration status: euvolemic

## 2021-10-11 NOTE — PROGRESS NOTES
CLINICAL PHARMACY NOTE: MEDS TO BEDS    Total # of Prescriptions Filled: 1   The following medications were delivered to the patient:  · Cephalexin 500mg

## 2021-10-11 NOTE — ANESTHESIA PRE PROCEDURE
Department of Anesthesiology  Preprocedure Note       Name:  Saw Hu   Age:  70 y.o.  :  1950                                          MRN:  [id number]         Date:  10/11/2021      Surgeon: Imani Signs):  Brett Kwon DO    Procedure: Procedure(s):  AXONICS STIMULATOR INSERTION STAGE 1    Medications prior to admission:   Prior to Admission medications    Medication Sig Start Date End Date Taking?  Authorizing Provider   cephALEXin (KEFLEX) 500 MG capsule Take 1 capsule by mouth 2 times daily for 5 days 10/11/21 10/16/21 Yes Julia Mclean DO   simvastatin (ZOCOR) 20 MG tablet TAKE ONE TABLET BY MOUTH DAILY 9/29/21 10/29/21 Yes Nina Kebede PA-C   spironolactone (ALDACTONE) 50 MG tablet TAKE ONE TABLET BY MOUTH DAILY WITH LUNCH 21  Yes Nina Kebede PA-C   Cholecalciferol (VITAMIN D3) 50 MCG (2000 UT) CAPS Take by mouth 2 tablets daily   Yes Historical Provider, MD   Probiotic Product (PROBIOTIC-10 PO) Take by mouth   Yes Historical Provider, MD   Omega-3 Fatty Acids (FISH OIL) 1000 MG CAPS Take 3,000 mg by mouth 3 times daily   Yes Historical Provider, MD   aspirin 81 MG EC tablet Take 81 mg by mouth daily   Yes Historical Provider, MD   cloNIDine (CATAPRES) 0.3 MG tablet TAKE ONE TABLET BY MOUTH ONCE NIGHTLY 6/3/21  Yes Deepthi Macias MD   Calcium Citrate-Vitamin D (CALCIUM CITRATE + D PO) Take 1 tablet by mouth daily   Yes Historical Provider, MD   Calcium Polycarbophil (FIBER-CAPS PO) Take 5 capsules by mouth daily   Yes Historical Provider, MD   esomeprazole (NEXIUM) 20 MG delayed release capsule Take 2 capsules by mouth daily 10/5/17  Yes Deepthi Macias MD   oxybutynin (DITROPAN) 5 MG tablet Take 5 mg by mouth 2 times daily   Yes Historical Provider, MD   Multiple Vitamin TABS Take 1 tablet by mouth daily   Yes Historical Provider, MD   Ascorbic Acid (VITAMIN C) 500 MG tablet Take 500 mg by mouth daily Indications: take as directed   Yes Historical Provider, MD   Vitamin E 400 UNITS TABS Take 400 Units by mouth daily    Yes Historical Provider, MD   clindamycin (CLEOCIN) 300 MG capsule Take 1 capsule by mouth See Admin Instructions Take 2 caps 1 hr prior to dental maida't then 1 cap twice daily until gone  Patient not taking: Reported on 9/13/2021 3/28/16   Ivonne Roman MD       Current medications:    Current Facility-Administered Medications   Medication Dose Route Frequency Provider Last Rate Last Admin    0.9 % sodium chloride infusion   IntraVENous Continuous Kiara May MD        lactated ringers infusion   IntraVENous Continuous Kiara May  mL/hr at 10/11/21 0725 New Bag at 10/11/21 0725    sodium chloride flush 0.9 % injection 10 mL  10 mL IntraVENous 2 times per day Kiara May MD        sodium chloride flush 0.9 % injection 10 mL  10 mL IntraVENous PRN Kiara May MD        0.9 % sodium chloride infusion  25 mL IntraVENous PRN Kiara May MD        clindamycin (CLEOCIN) IVPB 300 mg  300 mg IntraVENous Once Waldemar Neeta, DO        gentamicin (GARAMYCIN) IVPB 100mg in sodium cloride 0.9 % 100 mL IVPB  80 mg IntraVENous Once Waldemar Neeta, DO        clindamycin (CLEOCIN) 300 MG/50ML IVPB             bupivacaine-EPINEPHrine (MARCAINE-w/EPINEPHRINE) 0.5% -[id number] injection                Allergies:     Allergies   Allergen Reactions    Ciprofloxacin     Codeine     Lisinopril     Morphine     Norvasc [Amlodipine Besylate]     Penicillins     Sanctura [Trospium] Nausea Only     Bloated, nauseated    Xarelto [Rivaroxaban]        Problem List:    Patient Active Problem List   Diagnosis Code    Asthma J45.909    Cervical disc disease M50.90    Esophageal reflux K21.9    Fatigue R53.83    Hip joint stiffness M25.659    Hyperlipidemia E78.5    Essential hypertension I10    Hypokalemia E87.6    Joint pain, hip M25.559    Lower back pain M54.50    Muscle spasm M62.838    Nasal congestion R09.81    Neck pain M54.2    Osteoarthritis of hip M16.9    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    Sacroiliac strain S39.012A    Scoliosis M41.9    Shortness of breath R06.02    Snoring R06.83    Tachycardia R00.0    Urinary incontinence R32    Weight gain R63.5    Acute internal derangement of right knee M23.91    BMI 36.0-36.9,adult Z68.36       Past Medical History:        Diagnosis Date    Arthritis     Asthma     \"winter\" asthma    Bronchitis     Constipation     GERD (gastroesophageal reflux disease)     Hyperlipidemia     Hypertension     Hypokalemia     Overactive bladder        Past Surgical History:        Procedure Laterality Date    BACK SURGERY  1983 and 1992    neck and lower back    BLADDER SURGERY  2006, 2008    2 surgeries: sling surgery first then another sling and tack bladder up surgery    CATARACT REMOVAL WITH IMPLANT Bilateral     COLONOSCOPY  2015    had polyp, done at [facility]. Dr Jayne Sena 8/21/2018    COLONOSCOPY POLYPECTOMY /COLD BX  performed by Dmitri Balderas MD at [address] [place] [address]  02/2017    upper rt back.  CO KNEE SCOPE,DIAGNOSTIC Right 10/4/2018    KNEE ARTHROSCOPY FOR PARTIAL, MEDIAL MENISCECTOMY. performed by Chidi Richardson MD at [address] [place] [address] Bilateral 2013, 2015    Dr Marcio Crooks History:    Social History     Tobacco Use    Smoking status: Never Smoker    Smokeless tobacco: Never Used   Substance Use Topics    Alcohol use:  Yes     Alcohol/week: 0.0 standard drinks     Types: 1 Glasses of wine, 1 Cans of beer, 1 Shots of liquor per week     Comment: 3 times a week                                Counseling given: Not Answered      Vital Signs (Current):   Vitals:    10/11/21 0654 10/11/21 0706   BP:  (!) 170/93   Pulse:  73   Resp:  18   Temp:  97.7 °F (36.5 °C)   TempSrc:  Infrared   SpO2:  97%   Weight: 214 lb (97.1 kg)    Height: 5' 4\" (1.626 m)                                               BP Readings from Last 3 Encounters:   10/11/21 (!) 170/93   09/29/21 136/88   09/13/21 114/76       NPO Status: Time of last liquid consumption: 2000                        Time of last solid consumption: 1400                        Date of last liquid consumption: 10/10/21                        Date of last solid food consumption: 10/10/21    BMI:   Wt Readings from Last 3 Encounters:   10/11/21 214 lb (97.1 kg)   09/29/21 211 lb (95.7 kg)   09/13/21 213 lb 9.6 oz (96.9 kg)     Body mass index is 36.73 kg/m². CBC:   Lab Results   Component Value Date    WBC 7.3 09/29/2021    RBC 5.14 09/29/2021    HGB 14.8 09/29/2021    HCT 45.7 09/29/2021    MCV 88.9 09/29/2021    RDW 13.0 09/29/2021     09/29/2021       CMP:   Lab Results   Component Value Date     09/29/2021    K 4.9 09/29/2021     09/29/2021    CO2 24 09/29/2021    BUN 18 09/29/2021    CREATININE 1.11 09/29/2021    GFRAA 59 09/29/2021    LABGLOM 48 09/29/2021    GLUCOSE 98 09/25/2018    PROT 7.5 09/29/2021    CALCIUM 9.6 09/29/2021    BILITOT 0.48 09/29/2021    ALKPHOS 89 09/29/2021    AST 18 09/29/2021    ALT 16 09/29/2021       POC Tests: No results for input(s): POCGLU, POCNA, POCK, POCCL, POCBUN, POCHEMO, POCHCT in the last 72 hours.     Coags: No results found for: PROTIME, INR, APTT    HCG (If Applicable): No results found for: PREGTESTUR, PREGSERUM, HCG, HCGQUANT     ABGs: No results found for: PHART, PO2ART, WVZ7RRF, VBC0VKQ, BEART, R4JSOCFY     Type & Screen (If Applicable):  No results found for: LABABO, LABRH    Drug/Infectious Status (If Applicable):  No results found for: HIV, HEPCAB    COVID-19 Screening (If Applicable):   Lab Results   Component Value Date    COVID19 Not Detected 10/11/2021           Anesthesia Evaluation  Patient summary reviewed and Nursing notes reviewed  Airway: Mallampati: I  TM distance: >3 FB   Neck ROM: full  Mouth opening: > = 3 FB Dental:      Comment: -PERMANENT UPPER AND LOWER IMPLANTS  -MISSING SOME UPPER AND LOWER TEETH    Pulmonary:normal

## 2021-10-15 NOTE — DISCHARGE SUMMARY
Gyn Discharge Summary  [facility]      Patient Name: Jose De La Cruz  Patient : 1950  Primary Care Physician: Amada Hernandez MD  Admit Date: 10/25/2021    Principal Diagnosis: Urinary incontinence     Other Diagnosis:   URINARY INCONTINENCE  Patient Active Problem List   Diagnosis    Asthma    Cervical disc disease    Esophageal reflux    Fatigue    Hip joint stiffness    Hyperlipidemia    Essential hypertension    Hypokalemia    Joint pain, hip    Lower back pain    Muscle spasm    Nasal congestion    Neck pain    Osteoarthritis of hip    Osteoarthritis of neck    Sacroiliac strain    Scoliosis    Shortness of breath    Snoring    Tachycardia    Urinary incontinence    Weight gain    Acute internal derangement of right knee    BMI 36.0-36.9,adult       Infection: No  Hospital Acquired: No    Surgical Operations & Procedures: axonics stimulator insertion stage II     Consultations: Anesthesia    Pertinent Findings & Procedures:   Jose De La Cruz is a 70 y.o. female   admitted for surgical management of urinary incontinence; received [place] and [place] preoperatively. She underwent axonics stimulator insertion stage II on 10/25/21. Post-op course normal, discharged home. Follow up in 1 week. Discharge instructions reviewed and questions answered.     Course of patient: normal    Discharge to: Home    Readmission planned: No    Recommendations on Discharge:     Medications:   Broderick Lawrence   Home Medication Instructions [facility]:[id number]    Printed on:10/25/21 9816   Medication Information                      Ascorbic Acid (VITAMIN C) 500 MG tablet  Take 500 mg by mouth daily Indications: take as directed             aspirin 81 MG EC tablet  Take 81 mg by mouth daily             Calcium Citrate-Vitamin D (CALCIUM CITRATE + D PO)  Take 1 tablet by mouth daily             Calcium Polycarbophil (FIBER-CAPS PO)  Take 5 capsules by mouth daily Cholecalciferol (VITAMIN D3) 50 MCG (2000 UT) CAPS  Take by mouth 2 tablets daily             cloNIDine (CATAPRES) 0.3 MG tablet  TAKE ONE TABLET BY MOUTH ONCE NIGHTLY             Coenzyme Q10 (CO Q 10) 100 MG CAPS  Take by mouth             esomeprazole (NEXIUM) 20 MG delayed release capsule  Take 2 capsules by mouth daily             Multiple Vitamin TABS  Take 1 tablet by mouth daily             Omega-3 Fatty Acids (FISH OIL) 1000 MG CAPS  Take 3,000 mg by mouth 3 times daily             Probiotic Product (PROBIOTIC-10 PO)  Take by mouth             simvastatin (ZOCOR) 20 MG tablet  TAKE ONE TABLET BY MOUTH DAILY             spironolactone (ALDACTONE) 50 MG tablet  TAKE ONE TABLET BY MOUTH DAILY WITH LUNCH             Vitamin E 400 UNITS TABS  Take 400 Units by mouth daily                    Activity:  no lifting greater than 20 lbs  Diet: regular diet  Follow up: 1 week    Condition on discharge: good and stable   Discharge Date: 10/25/21    Comments:  Home care, Follow-up care, restrictions reviewed.     Jerome Damian DO  Ob/Gyn Resident  [facility]  10/25/2021, 8:10 AM

## 2021-10-15 NOTE — H&P
OB/GYN Pre-Op H&P  [facility]    Patient Name: Efe Bhakta     Patient : 1950  [facility]  Admission Date/Time: 10/25/2021  6:09 AM  Primary Care Physician: Sumit Kirkland MD  MRN: [id number]    Date: 10/25/2021    Time: 7:18 AM    The patient was seen in pre-op holding. She is here for axonics stimulator insertion stage 2 for history of urinary incontinence. The procedure risks and complications were reviewed. The labs, Consent, and H&P were reviewed and updated. The patient was counseled on the possibility of  the need of a second surgery. The patient voiced understanding and had all of her questions answered. OBSTETRICAL HISTORY:   OB History   No obstetric history on file. PAST MEDICAL HISTORY:   has a past medical history of Arthritis, Asthma, Bronchitis, Constipation, GERD (gastroesophageal reflux disease), Hyperlipidemia, Hypertension, Hypokalemia, and Overactive bladder. PAST SURGICAL HISTORY:   has a past surgical history that includes back surgery ( and ); Bladder surgery (, ); Tubal ligation; Total hip arthroplasty (Bilateral, , ); cyst removal (2017); Colonoscopy (); Colonoscopy (N/A, 2018); Cataract removal with implant (Bilateral); pr knee scope,diagnostic (Right, 10/4/2018); Stimulator Surgery (N/A, 10/11/2021); and Stimulator Surgery (N/A, 10/11/2021).     ALLERGIES:  Allergies as of 2021 - Review Complete 05/10/2021   Allergen Reaction Noted    Ciprofloxacin  2015    Codeine  2015    Lisinopril  2015    Morphine  2015    Norvasc [amlodipine besylate]  2015    Penicillins  2015    Sanctura [trospium] Nausea Only 2016    Xarelto [rivaroxaban]  2015       MEDICATIONS:  Current Facility-Administered Medications   Medication Dose Route Frequency Provider Last Rate Last Admin    lactated ringers infusion   IntraVENous Continuous Sony Nerissa Ayala MD        sodium chloride flush 0.9 % injection 10 mL  10 mL IntraVENous 2 times per day Sharyn Juan MD        sodium chloride flush 0.9 % injection 10 mL  10 mL IntraVENous PRN Sharyn Juan MD        0.9 % sodium chloride infusion  25 mL IntraVENous PRN Sharyn Juan MD        lidocaine PF 1 % injection 1 mL  1 mL IntraDERmal Once PRN Sharyn Juan MD        propofol 500 MG/50ML injection             meperidine (DEMEROL) injection 12.5 mg  12.5 mg IntraVENous Q5 Min PRN Janeth Orozco MD        fentaNYL (SUBLIMAZE) injection 25 mcg  25 mcg IntraVENous Q5 Min PRN Janeth Orozco MD        ondansetron [facility]) injection 4 mg  4 mg IntraVENous Once PRN Janeth Orozco MD        promethazine (PHENERGAN) injection 6.25 mg  6.25 mg IntraMUSCular Once PRN Janeth Orozco MD        diphenhydrAMINE (BENADRYL) injection 12.5 mg  12.5 mg IntraVENous Once PRN Janeth Orozco MD        hydrALAZINE (APRESOLINE) injection 5 mg  5 mg IntraVENous Q10 Min PRN Janeth Orozco MD           FAMILY HISTORY:  family history includes Fainting in her mother; Heart Disease in her maternal grandmother; Hypertension in her mother and sister. SOCIAL HISTORY:   reports that she has never smoked. She has never used smokeless tobacco. She reports current alcohol use. She reports that she does not use drugs.     VITALS:  Vitals:    10/25/21 0629   Pulse: 69   Resp: 18   Temp: 98 °F (36.7 °C)   TempSrc: Temporal   SpO2: 100%   Weight: 212 lb 6.4 oz (96.3 kg)   Height: 5' 4\" (1.626 m)                                                                                                                               PHYSICAL EXAM:     Unchanged from Prior H&P  CONSTITUTIONAL:  Alert and oriented, no acute distress  HEAD: normocephalic, atraumatic  EYES: Pupils equal and reactive to light, Extraocular muscles intact, sclera non icteric  ENT: Mucus membranes moist, No otorrhea, no rhinorrhea  NECK:  supple, symmetrical, trachea midline   LUNGS:  Good air movement bilaterally, unlabored respirations, no wheezes or rhonchi  CARDIOVASCULAR: Regular rate and rhythm   ABDOMEN: soft, non tender, non distended, no rebound or guarding, no hernias, no hepatomegaly, no splenomegly  MUSCULOSKELETAL:  Equal strength bilaterally, normal muscle tone  SKIN: No abscess or rash  NEUROLOGIC:  no focal deficits  PSYCH: affect appropriate  Pelvic Exam: deferred to OR      LAB RESULTS:  Admission on 10/11/2021, Discharged on 10/11/2021   Component Date Value Ref Range Status    Specimen Description 10/11/2021 . NASOPHARYNGEAL SWAB   Final    SARS-CoV-2, Rapid 10/11/2021 Not Detected  Not Detected Final    Comment:       Rapid NAAT:  The specimen is NEGATIVE for SARS-CoV-2, the novel coronavirus associated with   COVID-19. The ID NOW COVID-19 assay is designed to detect the virus that causes COVID-19 in patients   with signs and symptoms of infection who are suspected of COVID-19. An individual without symptoms of COVID-19 and who is not shedding SARS-CoV-2 virus would   expect to have a negative (not detected) result in this assay. Negative results should be treated as presumptive and, if inconsistent with clinical signs   and symptoms or necessary for patient management,  should be tested with an alternative molecular assay. Negative results do not preclude   SARS-CoV-2 infection and   should not be used as the sole basis for patient management decisions.          Fact sheet for Healthcare Providers: Quoc  Fact sheet for Patients: Quoc          Methodology: Isothermal Nucleic Acid Amplification     Hospital Outpatient Visit on 09/29/2021   Component Date Value Ref Range Status    Ventricular Rate 09/29/2021 68  BPM Final    Atrial Rate 09/29/2021 68  BPM Final    P-R Interval 09/29/2021 160  ms Final    QRS Duration 09/29/2021 82  ms Final    Q-T Interval 09/29/2021 414  ms Final    QTc Calculation (Bazett) 09/29/2021 440  ms Final    P Axis 09/29/2021 40  degrees Final    R Axis 09/29/2021 21  degrees Final    T Axis 09/29/2021 48  degrees Final   Hospital Outpatient Visit on 09/29/2021   Component Date Value Ref Range Status    WBC 09/29/2021 7.3  3.5 - 11.3 k/uL Final    RBC 09/29/2021 5.14* 3.95 - 5.11 m/uL Final    Hemoglobin 09/29/2021 14.8  11.9 - 15.1 g/dL Final    Hematocrit 09/29/2021 45.7  36.3 - 47.1 % Final    MCV 09/29/2021 88.9  82.6 - 102.9 fL Final    MCH 09/29/2021 28.8  25.2 - 33.5 pg Final    MCHC 09/29/2021 32.4  28.4 - 34.8 g/dL Final    RDW 09/29/2021 13.0  11.8 - 14.4 % Final    Platelets 82/10/2981 266  138 - 453 k/uL Final    MPV 09/29/2021 10.0  8.1 - 13.5 fL Final    NRBC Automated 09/29/2021 0.0  0.0 per 100 WBC Final   Hospital Outpatient Visit on 09/29/2021   Component Date Value Ref Range Status    Glucose, Fasting 09/29/2021 92  70 - 99 mg/dL Final    BUN 09/29/2021 18  8 - 23 mg/dL Final    CREATININE 09/29/2021 1.11* 0.50 - 0.90 mg/dL Final    Bun/Cre Ratio 09/29/2021 NOT REPORTED  9 - 20 Final    Calcium 09/29/2021 9.6  8.6 - 10.4 mg/dL Final    Sodium 09/29/2021 142  135 - 144 mmol/L Final    Potassium 09/29/2021 4.9  3.7 - 5.3 mmol/L Final    Chloride 09/29/2021 106  98 - 107 mmol/L Final    CO2 09/29/2021 24  20 - 31 mmol/L Final    Anion Gap 09/29/2021 12  9 - 17 mmol/L Final    Alkaline Phosphatase 09/29/2021 89  35 - 104 U/L Final    ALT 09/29/2021 16  5 - 33 U/L Final    AST 09/29/2021 18  <32 U/L Final    Total Bilirubin 09/29/2021 0.48  0.3 - 1.2 mg/dL Final    Total Protein 09/29/2021 7.5  6.4 - 8.3 g/dL Final    Albumin 09/29/2021 4.4  3.5 - 5.2 g/dL Final    Albumin/Globulin Ratio 09/29/2021 1.4  1.0 - 2.5 Final    GFR Non- 09/29/2021 48* >60 mL/min Final    GFR  09/29/2021 59* >60 mL/min Final    GFR Comment 09/29/2021        Final    Comment: Average GFR for 79or more years old:   76 mL/min/1.73sq m  Chronic Kidney Disease:   <60 mL/min/1.73sq m  Kidney failure:   <15 mL/min/1.73sq m              eGFR calculated using average adult body mass. Additional eGFR calculator available at:        [employer]            GFR Staging 09/29/2021 NOT REPORTED   Final       DIAGNOSTICS:     FLUORO FOR SURGICAL PROCEDURES    Result Date: 10/11/2021  Radiology exam is complete. No Radiologist dictation. Please follow up with ordering provider. DIAGNOSIS & PLAN:  1. Urinary Incontinence    - Proceed with planned procedure: Axonics Stimulator Insertion Stage 2    - Consent signed, on chart. - The patient is ready for transport to the operative suite. Counseling: The patient was counseled on all options both medical and surgical, conservative as well as definitive. She has elected to proceed with the procedure as stated above. The patient was counseled on the procedure. Risks and complications were reviewed in detail. The patients orders, labs, consents have been completed. The history and physical as well as all supporting surgical documentation will be forwarded to the pre-operative holding area. The patient is aware that this procedure may not alleviate her symptoms.       Tonia Rodriguez DO  Ob/Gyn Resident  [facility]  10/25/2021, 7:18 AM

## 2021-10-22 ENCOUNTER — ANESTHESIA EVENT (OUTPATIENT)
Dept: OPERATING ROOM | Age: 71
End: 2021-10-22
Payer: MEDICARE

## 2021-10-25 ENCOUNTER — ANESTHESIA (OUTPATIENT)
Dept: OPERATING ROOM | Age: 71
End: 2021-10-25
Payer: MEDICARE

## 2021-10-25 ENCOUNTER — HOSPITAL ENCOUNTER (OUTPATIENT)
Age: 71
Setting detail: OUTPATIENT SURGERY
Discharge: HOME | End: 2021-10-25
Attending: OBSTETRICS & GYNECOLOGY | Admitting: OBSTETRICS & GYNECOLOGY
Payer: MEDICARE

## 2021-10-25 VITALS
TEMPERATURE: 98 F | WEIGHT: 212.4 LBS | OXYGEN SATURATION: 95 % | RESPIRATION RATE: 18 BRPM | HEIGHT: 64 IN | HEART RATE: 61 BPM | SYSTOLIC BLOOD PRESSURE: 114 MMHG | DIASTOLIC BLOOD PRESSURE: 79 MMHG | BODY MASS INDEX: 36.26 KG/M2

## 2021-10-25 VITALS — OXYGEN SATURATION: 95 % | SYSTOLIC BLOOD PRESSURE: 101 MMHG | DIASTOLIC BLOOD PRESSURE: 60 MMHG

## 2021-10-25 PROCEDURE — 2580000003 HC RX 258: Performed by: STUDENT IN AN ORGANIZED HEALTH CARE EDUCATION/TRAINING PROGRAM

## 2021-10-25 PROCEDURE — 3700000001 HC ADD 15 MINUTES (ANESTHESIA): Performed by: OBSTETRICS & GYNECOLOGY

## 2021-10-25 PROCEDURE — 2500000003 HC RX 250 WO HCPCS: Performed by: STUDENT IN AN ORGANIZED HEALTH CARE EDUCATION/TRAINING PROGRAM

## 2021-10-25 PROCEDURE — 6360000002 HC RX W HCPCS: Performed by: NURSE ANESTHETIST, CERTIFIED REGISTERED

## 2021-10-25 PROCEDURE — 6360000002 HC RX W HCPCS: Performed by: STUDENT IN AN ORGANIZED HEALTH CARE EDUCATION/TRAINING PROGRAM

## 2021-10-25 PROCEDURE — 2500000003 HC RX 250 WO HCPCS: Performed by: OBSTETRICS & GYNECOLOGY

## 2021-10-25 PROCEDURE — 3700000000 HC ANESTHESIA ATTENDED CARE: Performed by: OBSTETRICS & GYNECOLOGY

## 2021-10-25 PROCEDURE — 2580000003 HC RX 258: Performed by: ANESTHESIOLOGY

## 2021-10-25 PROCEDURE — 7100000011 HC PHASE II RECOVERY - ADDTL 15 MIN: Performed by: OBSTETRICS & GYNECOLOGY

## 2021-10-25 PROCEDURE — 2500000003 HC RX 250 WO HCPCS: Performed by: NURSE ANESTHETIST, CERTIFIED REGISTERED

## 2021-10-25 PROCEDURE — 3600000002 HC SURGERY LEVEL 2 BASE: Performed by: OBSTETRICS & GYNECOLOGY

## 2021-10-25 PROCEDURE — 2709999900 HC NON-CHARGEABLE SUPPLY: Performed by: OBSTETRICS & GYNECOLOGY

## 2021-10-25 PROCEDURE — L8689 EXTERNAL RECHARG SYS INTERN: HCPCS | Performed by: OBSTETRICS & GYNECOLOGY

## 2021-10-25 PROCEDURE — C1820 GENERATOR NEURO RECHG BAT SY: HCPCS | Performed by: OBSTETRICS & GYNECOLOGY

## 2021-10-25 PROCEDURE — 3600000012 HC SURGERY LEVEL 2 ADDTL 15MIN: Performed by: OBSTETRICS & GYNECOLOGY

## 2021-10-25 PROCEDURE — 7100000010 HC PHASE II RECOVERY - FIRST 15 MIN: Performed by: OBSTETRICS & GYNECOLOGY

## 2021-10-25 DEVICE — NEUROSTIMULATOR
Type: IMPLANTABLE DEVICE | Site: BACK | Status: FUNCTIONAL
Brand: AXONICS

## 2021-10-25 RX ORDER — ONDANSETRON 2 MG/ML
4 INJECTION INTRAMUSCULAR; INTRAVENOUS
Status: DISCONTINUED | OUTPATIENT
Start: 2021-10-25 | End: 2021-10-25 | Stop reason: HOSPADM

## 2021-10-25 RX ORDER — FENTANYL CITRATE 50 UG/ML
25 INJECTION, SOLUTION INTRAMUSCULAR; INTRAVENOUS EVERY 5 MIN PRN
Status: DISCONTINUED | OUTPATIENT
Start: 2021-10-25 | End: 2021-10-25 | Stop reason: HOSPADM

## 2021-10-25 RX ORDER — HYDRALAZINE HYDROCHLORIDE 20 MG/ML
5 INJECTION INTRAMUSCULAR; INTRAVENOUS EVERY 10 MIN PRN
Status: DISCONTINUED | OUTPATIENT
Start: 2021-10-25 | End: 2021-10-25 | Stop reason: HOSPADM

## 2021-10-25 RX ORDER — LIDOCAINE HYDROCHLORIDE 10 MG/ML
INJECTION, SOLUTION EPIDURAL; INFILTRATION; INTRACAUDAL; PERINEURAL PRN
Status: DISCONTINUED | OUTPATIENT
Start: 2021-10-25 | End: 2021-10-25 | Stop reason: SDUPTHER

## 2021-10-25 RX ORDER — PROPOFOL 10 MG/ML
INJECTION, EMULSION INTRAVENOUS CONTINUOUS PRN
Status: DISCONTINUED | OUTPATIENT
Start: 2021-10-25 | End: 2021-10-25 | Stop reason: SDUPTHER

## 2021-10-25 RX ORDER — BUPIVACAINE HYDROCHLORIDE AND EPINEPHRINE 5; 5 MG/ML; UG/ML
INJECTION, SOLUTION PERINEURAL PRN
Status: DISCONTINUED | OUTPATIENT
Start: 2021-10-25 | End: 2021-10-25 | Stop reason: ALTCHOICE

## 2021-10-25 RX ORDER — CLINDAMYCIN PHOSPHATE 300 MG/50ML
300 INJECTION INTRAVENOUS ONCE
Status: COMPLETED | OUTPATIENT
Start: 2021-10-25 | End: 2021-10-25

## 2021-10-25 RX ORDER — FENTANYL CITRATE 50 UG/ML
INJECTION, SOLUTION INTRAMUSCULAR; INTRAVENOUS PRN
Status: DISCONTINUED | OUTPATIENT
Start: 2021-10-25 | End: 2021-10-25 | Stop reason: SDUPTHER

## 2021-10-25 RX ORDER — SODIUM CHLORIDE 0.9 % (FLUSH) 0.9 %
10 SYRINGE (ML) INJECTION EVERY 12 HOURS SCHEDULED
Status: DISCONTINUED | OUTPATIENT
Start: 2021-10-25 | End: 2021-10-25 | Stop reason: HOSPADM

## 2021-10-25 RX ORDER — MIDAZOLAM HYDROCHLORIDE 1 MG/ML
INJECTION INTRAMUSCULAR; INTRAVENOUS PRN
Status: DISCONTINUED | OUTPATIENT
Start: 2021-10-25 | End: 2021-10-25 | Stop reason: SDUPTHER

## 2021-10-25 RX ORDER — CLINDAMYCIN PHOSPHATE 300 MG/50ML
INJECTION INTRAVENOUS
Status: COMPLETED
Start: 2021-10-25 | End: 2021-10-25

## 2021-10-25 RX ORDER — DIPHENHYDRAMINE HYDROCHLORIDE 50 MG/ML
12.5 INJECTION INTRAMUSCULAR; INTRAVENOUS
Status: DISCONTINUED | OUTPATIENT
Start: 2021-10-25 | End: 2021-10-25 | Stop reason: HOSPADM

## 2021-10-25 RX ORDER — SODIUM CHLORIDE 9 MG/ML
25 INJECTION, SOLUTION INTRAVENOUS PRN
Status: DISCONTINUED | OUTPATIENT
Start: 2021-10-25 | End: 2021-10-25 | Stop reason: HOSPADM

## 2021-10-25 RX ORDER — MEPERIDINE HYDROCHLORIDE 50 MG/ML
12.5 INJECTION INTRAMUSCULAR; INTRAVENOUS; SUBCUTANEOUS EVERY 5 MIN PRN
Status: DISCONTINUED | OUTPATIENT
Start: 2021-10-25 | End: 2021-10-25 | Stop reason: HOSPADM

## 2021-10-25 RX ORDER — LIDOCAINE HYDROCHLORIDE 10 MG/ML
1 INJECTION, SOLUTION EPIDURAL; INFILTRATION; INTRACAUDAL; PERINEURAL
Status: DISCONTINUED | OUTPATIENT
Start: 2021-10-25 | End: 2021-10-25 | Stop reason: HOSPADM

## 2021-10-25 RX ORDER — SODIUM CHLORIDE 0.9 % (FLUSH) 0.9 %
10 SYRINGE (ML) INJECTION PRN
Status: DISCONTINUED | OUTPATIENT
Start: 2021-10-25 | End: 2021-10-25 | Stop reason: HOSPADM

## 2021-10-25 RX ORDER — PROPOFOL 10 MG/ML
INJECTION, EMULSION INTRAVENOUS
Status: COMPLETED
Start: 2021-10-25 | End: 2021-10-25

## 2021-10-25 RX ORDER — PROMETHAZINE HYDROCHLORIDE 25 MG/ML
6.25 INJECTION, SOLUTION INTRAMUSCULAR; INTRAVENOUS
Status: DISCONTINUED | OUTPATIENT
Start: 2021-10-25 | End: 2021-10-25 | Stop reason: HOSPADM

## 2021-10-25 RX ORDER — SODIUM CHLORIDE, SODIUM LACTATE, POTASSIUM CHLORIDE, CALCIUM CHLORIDE 600; 310; 30; 20 MG/100ML; MG/100ML; MG/100ML; MG/100ML
INJECTION, SOLUTION INTRAVENOUS CONTINUOUS
Status: DISCONTINUED | OUTPATIENT
Start: 2021-10-25 | End: 2021-10-25 | Stop reason: HOSPADM

## 2021-10-25 RX ADMIN — FENTANYL CITRATE 50 MCG: 50 INJECTION, SOLUTION INTRAMUSCULAR; INTRAVENOUS at 07:29

## 2021-10-25 RX ADMIN — PROPOFOL INJECTABLE EMULSION 50 MCG/KG/MIN: 10 INJECTION, EMULSION INTRAVENOUS at 07:29

## 2021-10-25 RX ADMIN — MIDAZOLAM HYDROCHLORIDE 1 MG: 1 INJECTION, SOLUTION INTRAMUSCULAR; INTRAVENOUS at 07:23

## 2021-10-25 RX ADMIN — SODIUM CHLORIDE, POTASSIUM CHLORIDE, SODIUM LACTATE AND CALCIUM CHLORIDE: 600; 310; 30; 20 INJECTION, SOLUTION INTRAVENOUS at 07:23

## 2021-10-25 RX ADMIN — CLINDAMYCIN PHOSPHATE 300 MG: 300 INJECTION, SOLUTION INTRAVENOUS at 07:31

## 2021-10-25 RX ADMIN — FENTANYL CITRATE 25 MCG: 50 INJECTION, SOLUTION INTRAMUSCULAR; INTRAVENOUS at 07:58

## 2021-10-25 RX ADMIN — MIDAZOLAM HYDROCHLORIDE 0.5 MG: 1 INJECTION, SOLUTION INTRAMUSCULAR; INTRAVENOUS at 07:27

## 2021-10-25 RX ADMIN — GENTAMICIN SULFATE 80 MG: 40 INJECTION, SOLUTION INTRAMUSCULAR; INTRAVENOUS at 07:31

## 2021-10-25 RX ADMIN — LIDOCAINE HYDROCHLORIDE 30 MG: 10 INJECTION, SOLUTION EPIDURAL; INFILTRATION; INTRACAUDAL; PERINEURAL at 07:29

## 2021-10-25 RX ADMIN — MIDAZOLAM HYDROCHLORIDE 0.5 MG: 1 INJECTION, SOLUTION INTRAMUSCULAR; INTRAVENOUS at 07:25

## 2021-10-25 RX ADMIN — FENTANYL CITRATE 25 MCG: 50 INJECTION, SOLUTION INTRAMUSCULAR; INTRAVENOUS at 07:47

## 2021-10-25 ASSESSMENT — PULMONARY FUNCTION TESTS
PIF_VALUE: 1
PIF_VALUE: 2
PIF_VALUE: 1
PIF_VALUE: 1
PIF_VALUE: 2
PIF_VALUE: 1
PIF_VALUE: 2
PIF_VALUE: 1

## 2021-10-25 ASSESSMENT — PAIN SCALES - GENERAL
PAINLEVEL_OUTOF10: 0

## 2021-10-25 ASSESSMENT — ENCOUNTER SYMPTOMS: SHORTNESS OF BREATH: 1

## 2021-10-25 ASSESSMENT — PAIN - FUNCTIONAL ASSESSMENT: PAIN_FUNCTIONAL_ASSESSMENT: 0-10

## 2021-10-25 NOTE — OP NOTE
[address]                                OPERATIVE REPORT    PATIENT NAME: Evaristo Patrick                     :        1950  MED REC NO:   [id number]                             ROOM:  ACCOUNT NO:   [de-identified]                           ADMIT DATE: 10/25/2021  PROVIDER:     Jeremías Gupta    DATE OF PROCEDURE:  10/25/2021    INDICATION FOR SURGERY:  Refractory urge urinary incontinence with  greater than 50% improvement on stage I temporary stimulation. PREOPERATIVE DIAGNOSIS:  Refractory urge urinary incontinence with  greater than 50% improvement on stage I temporary stimulation. POSTOPERATIVE DIAGNOSIS:  Refractory urge urinary incontinence with  greater than 50% improvement on stage I temporary stimulation. OPERATION PERFORMED:  Axonics stage II permanent neurostimulator  placement. SURGEON:  Jeremías Gupta DO.    ASSISTANT:  Erika Álvarez DO.    ANESTHESIA:  General.    FINDINGS:  As above. SPECIMENS:  None. COMPLICATIONS:  None. BLOOD LOSS:  Negligible. COURSE:  Prior to the procedure, risks and benefits of the procedure  were explained to the patient. The patient understood and signed  informed consent under no duress or confusion. She had over 80% to 90%  improvement in urgency, frequency, and urge-related leakage with  nocturia. She did receive a preoperative antibiotic and EPC cuffs were  functional.  She wished to proceed. She was taken back to the OR, and  prepped and draped in sterile fashion in the prone position under  general anesthesia. A surgical time-out was taken. The temporary  neurostimulator incision was anesthetized with 1% lidocaine with  epinephrine. A scalpel incision was made and the stimulator cord was  exteriorized. Using a torque wrench screw, the temporary lead wire was   from the external wire.   The housing was cut and the wire was  removed. The temporary neurostimulator wire was then attached to the  battery and a torque screw wrench was used to tighten appropriately with  one click. The permanent neurostimulator was then placed within the  confines of the incisional pocket and irrigated with a copious amount of  saline. All readings were functional and the skin incision was closed  with delayed absorbable suture and skin adhesive. The patient tolerated  the procedure well, went to Recovery in stable fashion. She received  preoperative antibiotics. No antibiotic need for home. She may use  over-the-counter Tylenol. She will follow up in one to two weeks for  postoperative followup. The  was contacted postoperatively by  personal consultation. Kimberlee Quinteros    D: 10/25/2021 11:44:42       T: 10/25/2021 11:48:08     [email or web address]  Job#: [id number]     Doc#: [id number]    CC:   Jermaine Lennon

## 2021-10-25 NOTE — ANESTHESIA PRE PROCEDURE
Department of Anesthesiology  Preprocedure Note       Name:  Efe Bhakta   Age:  70 y.o.  :  1950                                          MRN:  [id number]         Date:  10/25/2021      Surgeon: Skylar Humphrey):  Radha Armstrong DO    Procedure: Procedure(s):  AXONICS STIMULATOR INSERTION STAGE 2    Medications prior to admission:   Prior to Admission medications    Medication Sig Start Date End Date Taking?  Authorizing Provider   Coenzyme Q10 (CO Q 10) 100 MG CAPS Take by mouth   Yes Historical Provider, MD   simvastatin (ZOCOR) 20 MG tablet TAKE ONE TABLET BY MOUTH DAILY 9/29/21 10/29/21 Yes Richard Rocha PA-C   spironolactone (ALDACTONE) 50 MG tablet TAKE ONE TABLET BY MOUTH DAILY WITH LUNCH 21  Yes Richard Rocha PA-C   Cholecalciferol (VITAMIN D3) 50 MCG ( UT) CAPS Take by mouth 2 tablets daily   Yes Historical Provider, MD   Probiotic Product (PROBIOTIC-10 PO) Take by mouth   Yes Historical Provider, MD   Omega-3 Fatty Acids (FISH OIL) 1000 MG CAPS Take 3,000 mg by mouth 3 times daily   Yes Historical Provider, MD   aspirin 81 MG EC tablet Take 81 mg by mouth daily   Yes Historical Provider, MD   cloNIDine (CATAPRES) 0.3 MG tablet TAKE ONE TABLET BY MOUTH ONCE NIGHTLY 6/3/21  Yes Sumit Kirkland MD   Calcium Citrate-Vitamin D (CALCIUM CITRATE + D PO) Take 1 tablet by mouth daily   Yes Historical Provider, MD   Calcium Polycarbophil (FIBER-CAPS PO) Take 5 capsules by mouth daily   Yes Historical Provider, MD   esomeprazole (NEXIUM) 20 MG delayed release capsule Take 2 capsules by mouth daily 10/5/17  Yes Sumit Kirkland MD   Multiple Vitamin TABS Take 1 tablet by mouth daily   Yes Historical Provider, MD   Ascorbic Acid (VITAMIN C) 500 MG tablet Take 500 mg by mouth daily Indications: take as directed   Yes Historical Provider, MD   Vitamin E 400 UNITS TABS Take 400 Units by mouth daily    Yes Historical Provider, MD   clindamycin (CLEOCIN) 300 MG capsule Take 1 capsule by mouth See Admin Instructions Take 2 caps 1 hr prior to dental maida't then 1 cap twice daily until gone  Patient not taking: Reported on 9/13/2021 3/28/16   Corbin Grubbs MD       Current medications:    Current Facility-Administered Medications   Medication Dose Route Frequency Provider Last Rate Last Admin    lactated ringers infusion   IntraVENous Continuous Shannan Gil MD        sodium chloride flush 0.9 % injection 10 mL  10 mL IntraVENous 2 times per day Shannan Gil MD        sodium chloride flush 0.9 % injection 10 mL  10 mL IntraVENous PRN Shannan Gil MD        0.9 % sodium chloride infusion  25 mL IntraVENous PRN Shannan Gil MD        lidocaine PF 1 % injection 1 mL  1 mL IntraDERmal Once PRN Shannan Gil MD           Allergies:     Allergies   Allergen Reactions    Ciprofloxacin     Codeine     Lisinopril     Morphine     Norvasc [Amlodipine Besylate]     Penicillins     Sanctura [Trospium] Nausea Only     Bloated, nauseated    Xarelto [Rivaroxaban]        Problem List:    Patient Active Problem List   Diagnosis Code    Asthma J45.909    Cervical disc disease M50.90    Esophageal reflux K21.9    Fatigue R53.83    Hip joint stiffness M25.659    Hyperlipidemia E78.5    Essential hypertension I10    Hypokalemia E87.6    Joint pain, hip M25.559    Lower back pain M54.50    Muscle spasm M62.838    Nasal congestion R09.81    Neck pain M54.2    Osteoarthritis of hip M16.9    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    Sacroiliac strain S39.012A    Scoliosis M41.9    Shortness of breath R06.02    Snoring R06.83    Tachycardia R00.0    Urinary incontinence R32    Weight gain R63.5    Acute internal derangement of right knee M23.91    BMI 36.0-36.9,adult Z68.36       Past Medical History:        Diagnosis Date    Arthritis     Asthma     \"winter\" asthma    Bronchitis     Constipation     GERD (gastroesophageal reflux disease)     Hyperlipidemia     10/24/21    BMI:   Wt Readings from Last 3 Encounters:   10/25/21 212 lb 6.4 oz (96.3 kg)   10/11/21 214 lb (97.1 kg)   09/29/21 211 lb (95.7 kg)     Body mass index is 36.46 kg/m². CBC:   Lab Results   Component Value Date    WBC 7.3 09/29/2021    RBC 5.14 09/29/2021    HGB 14.8 09/29/2021    HCT 45.7 09/29/2021    MCV 88.9 09/29/2021    RDW 13.0 09/29/2021     09/29/2021       CMP:   Lab Results   Component Value Date     09/29/2021    K 4.9 09/29/2021     09/29/2021    CO2 24 09/29/2021    BUN 18 09/29/2021    CREATININE 1.11 09/29/2021    GFRAA 59 09/29/2021    LABGLOM 48 09/29/2021    GLUCOSE 98 09/25/2018    PROT 7.5 09/29/2021    CALCIUM 9.6 09/29/2021    BILITOT 0.48 09/29/2021    ALKPHOS 89 09/29/2021    AST 18 09/29/2021    ALT 16 09/29/2021       POC Tests: No results for input(s): POCGLU, POCNA, POCK, POCCL, POCBUN, POCHEMO, POCHCT in the last 72 hours.     Coags: No results found for: PROTIME, INR, APTT    HCG (If Applicable): No results found for: PREGTESTUR, PREGSERUM, HCG, HCGQUANT     ABGs: No results found for: PHART, PO2ART, ZIU8MDZ, RTS0ESD, BEART, L4WDPNCS     Type & Screen (If Applicable):  No results found for: LABABO, LABRH    Drug/Infectious Status (If Applicable):  No results found for: HIV, HEPCAB    COVID-19 Screening (If Applicable):   Lab Results   Component Value Date    COVID19 Not Detected 10/11/2021           Anesthesia Evaluation  Patient summary reviewed and Nursing notes reviewed no history of anesthetic complications:   Airway: Mallampati: II  TM distance: >3 FB   Neck ROM: full  Mouth opening: > = 3 FB Dental: normal exam         Pulmonary:normal exam  breath sounds clear to auscultation  (+) shortness of breath:  asthma: seasonal asthma,                            Cardiovascular:  Exercise tolerance: no interval change,   (+) hypertension: no interval change, hyperlipidemia        Rhythm: regular  Rate: normal                    Neuro/Psych: ROS comment: Cervical disc disease GI/Hepatic/Renal:   (+) GERD: no interval change,          ROS comment: URINARY INCONTINENCE, severe obesity. Endo/Other:    (+) : arthritis: OA and no interval change. , . Abdominal:       Abdomen: soft. Vascular: negative vascular ROS. Other Findings:             Anesthesia Plan      MAC and general     ASA 2       Induction: intravenous. MIPS: Prophylactic antiemetics administered. Anesthetic plan and risks discussed with patient. Plan discussed with CRNA.                 Noah Dominguez MD   10/25/2021

## 2021-10-25 NOTE — ANESTHESIA POSTPROCEDURE EVALUATION
POST- ANESTHESIA EVALUATION       Pt Name: Keegan Peoples  MRN: [id number]  YOB: 1950  Date of evaluation: 10/25/2021  Time:  9:02 AM      /79   Pulse 61   Temp 98 °F (36.7 °C) (Temporal)   Resp 18   Ht 5' 4\" (1.626 m)   Wt 212 lb 6.4 oz (96.3 kg)   SpO2 95%   BMI 36.46 kg/m²      Consciousness Level  Awake  Cardiopulmonary Status  Stable  Pain Adequately Treated YES  Nausea / Vomiting  NO  Adequate Hydration  YES  Anesthesia Related Complications NONE      Electronically signed by Janeth Orozco MD on 10/25/2021 at 9:02 AM       Department of Anesthesiology  Postprocedure Note    Patient: Keegan Peoples  MRN: [id number]  YOB: 1950  Date of evaluation: 10/25/2021  Time:  9:02 AM     Procedure Summary     Date: 10/25/21 Room / Location: [facility] [address]    Anesthesia Start: 2782 Anesthesia Stop: 2755    Procedure: [address] 2 (N/A ) Diagnosis: (URINARY INCONTINENCE)    Surgeons: Theo Tolliver DO Responsible Provider: Janeth Orozco MD    Anesthesia Type: MAC, general ASA Status: 2          Anesthesia Type: MAC, general    Kevin Phase I:      Kevin Phase II: Kevin Score: 9    Last vitals: Reviewed and per EMR flowsheets.        Anesthesia Post Evaluation

## 2021-10-26 ENCOUNTER — TELEPHONE (OUTPATIENT)
Dept: PRIMARY CARE CLINIC | Age: 71
End: 2021-10-26

## 2021-10-26 NOTE — TELEPHONE ENCOUNTER
Had a bladder surgery yesterday. She is wanting to get her COVID booster and wondered she should wait a period of time to heal or if she can just get it.

## 2021-11-29 ENCOUNTER — HOSPITAL ENCOUNTER (OUTPATIENT)
Dept: PREADMISSION TESTING | Age: 71
Discharge: HOME | End: 2021-12-03

## 2021-11-29 VITALS — WEIGHT: 205 LBS | BODY MASS INDEX: 35 KG/M2 | HEIGHT: 64 IN

## 2021-11-29 NOTE — PROGRESS NOTES
Pre-op Instructions For Out-Patient Endoscopy Surgery    Medication Instructions:  · Please stop herbs and any supplements now (includes vitamins and minerals). · Please contact your surgeon and prescribing physician for pre-op instructions for any blood thinners. (aspirin)    · If you have inhalers/aerosol treatments at home, please use them the morning of your surgery and bring the inhalers with you to the hospital.    · Please take the following medications the morning of your surgery with a sip of water:    Esomeprazole    Surgery Instructions:  1. After midnight before surgery:  Do not eat or drink anything, including water, mints, gum, and hard candy. You may brush your teeth without swallowing. No smoking, chewing tobacco, or street drugs. 2. Please shower or bathe before surgery. 3. Please do not wear any cologne, lotion, powder, jewelry, piercings, perfume, makeup, nail polish, hair accessories, or hair spray on the day of surgery. Wear loose comfortable clothing. 4. Leave your valuables at home. Bring a storage case for any glasses/contacts. 5. An adult who is responsible for you MUST drive you home and should be with you for the first 24 hours after surgery. The Day of Surgery:  · Arrive at [facility] AT [facility] Surgery Entrance at the time directed by your surgeon and check in at the desk. · If you have a living will or healthcare power of , please bring a copy. · You will be taken to the pre-op holding area where you will be prepared for surgery. A physical assessment will be performed by a nurse practitioner or house officer. Your IV will be started and you will meet your anesthesiologist.    · When you go to surgery, your family will be directed to the surgical waiting room, where the doctor should speak with them after your surgery.     · After surgery, you will be taken to the recovery room then when you are awake and stable you will go to the short stay unit for preparation to be discharged. Only your one designated person is allowed to come to short stay for your discharge. Above instructions reviewed via phone during PAT phone interview. Patient verbalized understanding of above instructions.

## 2021-11-29 NOTE — PROGRESS NOTES
Patient relates that she just had a Axonics bladder stimulator implanted by Dr. Karel Andres on 10/25/21. She relates that she does have a remote control for the stimulator. Patient instructed to bring the remote control in with her that day. Patient relates that she is going to call her [employer] rep  for any further instructions. Patient's medical history, EKG from 9/28/21, and recent bladder surgery discussed with Dr. Demetria Davila along with informing him that patient instructed by this nurse to bring in bladder stimulator remote control on day of surgery. This nurse also related to Dr. Demetria Davila that patient will be calling her [facility] rep to notify them of her upcoming surgery and for any further instructions.

## 2021-12-07 ENCOUNTER — HOSPITAL ENCOUNTER (OUTPATIENT)
Dept: WOMENS IMAGING | Age: 71
Discharge: HOME | End: 2021-12-09
Payer: MEDICARE

## 2021-12-07 DIAGNOSIS — Z12.31 ENCOUNTER FOR SCREENING MAMMOGRAM FOR BREAST CANCER: ICD-10-CM

## 2021-12-07 PROCEDURE — 77063 BREAST TOMOSYNTHESIS BI: CPT

## 2021-12-10 ENCOUNTER — ANESTHESIA EVENT (OUTPATIENT)
Dept: ENDOSCOPY | Age: 71
End: 2021-12-10
Payer: MEDICARE

## 2021-12-13 ENCOUNTER — HOSPITAL ENCOUNTER (OUTPATIENT)
Age: 71
Setting detail: OUTPATIENT SURGERY
Discharge: HOME | End: 2021-12-13
Attending: SURGERY | Admitting: SURGERY
Payer: MEDICARE

## 2021-12-13 ENCOUNTER — ANESTHESIA (OUTPATIENT)
Dept: ENDOSCOPY | Age: 71
End: 2021-12-13
Payer: MEDICARE

## 2021-12-13 VITALS
TEMPERATURE: 98 F | HEIGHT: 64 IN | DIASTOLIC BLOOD PRESSURE: 94 MMHG | WEIGHT: 205 LBS | BODY MASS INDEX: 35 KG/M2 | OXYGEN SATURATION: 97 % | HEART RATE: 70 BPM | SYSTOLIC BLOOD PRESSURE: 147 MMHG | RESPIRATION RATE: 14 BRPM

## 2021-12-13 VITALS
RESPIRATION RATE: 17 BRPM | TEMPERATURE: 96.8 F | SYSTOLIC BLOOD PRESSURE: 95 MMHG | OXYGEN SATURATION: 100 % | DIASTOLIC BLOOD PRESSURE: 53 MMHG

## 2021-12-13 PROCEDURE — 2580000003 HC RX 258: Performed by: ANESTHESIOLOGY

## 2021-12-13 PROCEDURE — 7100000001 HC PACU RECOVERY - ADDTL 15 MIN: Performed by: SURGERY

## 2021-12-13 PROCEDURE — 6360000002 HC RX W HCPCS: Performed by: NURSE ANESTHETIST, CERTIFIED REGISTERED

## 2021-12-13 PROCEDURE — 3700000001 HC ADD 15 MINUTES (ANESTHESIA): Performed by: SURGERY

## 2021-12-13 PROCEDURE — 2500000003 HC RX 250 WO HCPCS: Performed by: NURSE ANESTHETIST, CERTIFIED REGISTERED

## 2021-12-13 PROCEDURE — 3700000000 HC ANESTHESIA ATTENDED CARE: Performed by: SURGERY

## 2021-12-13 PROCEDURE — 3609010600 HC COLONOSCOPY POLYPECTOMY SNARE/COLD BIOPSY: Performed by: SURGERY

## 2021-12-13 PROCEDURE — 7100000000 HC PACU RECOVERY - FIRST 15 MIN: Performed by: SURGERY

## 2021-12-13 PROCEDURE — 88305 TISSUE EXAM BY PATHOLOGIST: CPT

## 2021-12-13 PROCEDURE — 2709999900 HC NON-CHARGEABLE SUPPLY: Performed by: SURGERY

## 2021-12-13 RX ORDER — LIDOCAINE HYDROCHLORIDE 20 MG/ML
INJECTION, SOLUTION EPIDURAL; INFILTRATION; INTRACAUDAL; PERINEURAL PRN
Status: DISCONTINUED | OUTPATIENT
Start: 2021-12-13 | End: 2021-12-13 | Stop reason: SDUPTHER

## 2021-12-13 RX ORDER — PROMETHAZINE HYDROCHLORIDE 25 MG/ML
6.25 INJECTION, SOLUTION INTRAMUSCULAR; INTRAVENOUS
Status: DISCONTINUED | OUTPATIENT
Start: 2021-12-13 | End: 2021-12-13

## 2021-12-13 RX ORDER — SODIUM CHLORIDE, SODIUM LACTATE, POTASSIUM CHLORIDE, CALCIUM CHLORIDE 600; 310; 30; 20 MG/100ML; MG/100ML; MG/100ML; MG/100ML
INJECTION, SOLUTION INTRAVENOUS CONTINUOUS
Status: DISCONTINUED | OUTPATIENT
Start: 2021-12-13 | End: 2021-12-13 | Stop reason: HOSPADM

## 2021-12-13 RX ORDER — 0.9 % SODIUM CHLORIDE 0.9 %
500 INTRAVENOUS SOLUTION INTRAVENOUS
Status: DISCONTINUED | OUTPATIENT
Start: 2021-12-13 | End: 2021-12-13 | Stop reason: HOSPADM

## 2021-12-13 RX ORDER — HYDRALAZINE HYDROCHLORIDE 20 MG/ML
5 INJECTION INTRAMUSCULAR; INTRAVENOUS EVERY 10 MIN PRN
Status: DISCONTINUED | OUTPATIENT
Start: 2021-12-13 | End: 2021-12-13 | Stop reason: HOSPADM

## 2021-12-13 RX ORDER — LIDOCAINE HYDROCHLORIDE 10 MG/ML
1 INJECTION, SOLUTION EPIDURAL; INFILTRATION; INTRACAUDAL; PERINEURAL
Status: DISCONTINUED | OUTPATIENT
Start: 2021-12-13 | End: 2021-12-13 | Stop reason: HOSPADM

## 2021-12-13 RX ORDER — DIPHENHYDRAMINE HYDROCHLORIDE 50 MG/ML
12.5 INJECTION INTRAMUSCULAR; INTRAVENOUS
Status: DISCONTINUED | OUTPATIENT
Start: 2021-12-13 | End: 2021-12-13 | Stop reason: HOSPADM

## 2021-12-13 RX ORDER — ONDANSETRON 2 MG/ML
4 INJECTION INTRAMUSCULAR; INTRAVENOUS
Status: DISCONTINUED | OUTPATIENT
Start: 2021-12-13 | End: 2021-12-13 | Stop reason: HOSPADM

## 2021-12-13 RX ORDER — LABETALOL HYDROCHLORIDE 5 MG/ML
5 INJECTION, SOLUTION INTRAVENOUS EVERY 10 MIN PRN
Status: DISCONTINUED | OUTPATIENT
Start: 2021-12-13 | End: 2021-12-13 | Stop reason: HOSPADM

## 2021-12-13 RX ORDER — PROPOFOL 10 MG/ML
INJECTION, EMULSION INTRAVENOUS PRN
Status: DISCONTINUED | OUTPATIENT
Start: 2021-12-13 | End: 2021-12-13 | Stop reason: SDUPTHER

## 2021-12-13 RX ADMIN — PROPOFOL 200 MG: 10 INJECTION, EMULSION INTRAVENOUS at 09:47

## 2021-12-13 RX ADMIN — PROPOFOL 50 MG: 10 INJECTION, EMULSION INTRAVENOUS at 09:48

## 2021-12-13 RX ADMIN — LIDOCAINE HYDROCHLORIDE 60 MG: 20 INJECTION, SOLUTION EPIDURAL; INFILTRATION; INTRACAUDAL; PERINEURAL at 09:47

## 2021-12-13 RX ADMIN — SODIUM CHLORIDE, POTASSIUM CHLORIDE, SODIUM LACTATE AND CALCIUM CHLORIDE: 600; 310; 30; 20 INJECTION, SOLUTION INTRAVENOUS at 08:53

## 2021-12-13 ASSESSMENT — ENCOUNTER SYMPTOMS
SHORTNESS OF BREATH: 0
CHEST TIGHTNESS: 0
TROUBLE SWALLOWING: 0
COUGH: 0
SINUS PRESSURE: 0
RHINORRHEA: 0
BACK PAIN: 0
SORE THROAT: 0
WHEEZING: 0
APNEA: 0
SINUS PAIN: 0
SHORTNESS OF BREATH: 0

## 2021-12-13 ASSESSMENT — PULMONARY FUNCTION TESTS
PIF_VALUE: 4
PIF_VALUE: 1
PIF_VALUE: 1
PIF_VALUE: 26
PIF_VALUE: 20
PIF_VALUE: 25
PIF_VALUE: 19
PIF_VALUE: 1
PIF_VALUE: 0
PIF_VALUE: 18
PIF_VALUE: 20
PIF_VALUE: 19
PIF_VALUE: 1
PIF_VALUE: 20
PIF_VALUE: 1
PIF_VALUE: 19
PIF_VALUE: 25
PIF_VALUE: 18
PIF_VALUE: 24
PIF_VALUE: 3
PIF_VALUE: 20
PIF_VALUE: 21
PIF_VALUE: 19
PIF_VALUE: 25
PIF_VALUE: 24
PIF_VALUE: 20
PIF_VALUE: 25
PIF_VALUE: 18
PIF_VALUE: 19
PIF_VALUE: 23
PIF_VALUE: 23
PIF_VALUE: 20
PIF_VALUE: 1
PIF_VALUE: 16
PIF_VALUE: 1
PIF_VALUE: 24
PIF_VALUE: 19
PIF_VALUE: 17
PIF_VALUE: 18
PIF_VALUE: 19
PIF_VALUE: 21
PIF_VALUE: 1
PIF_VALUE: 19
PIF_VALUE: 1
PIF_VALUE: 1
PIF_VALUE: 22
PIF_VALUE: 19
PIF_VALUE: 20
PIF_VALUE: 19
PIF_VALUE: 22
PIF_VALUE: 19
PIF_VALUE: 23

## 2021-12-13 ASSESSMENT — PAIN SCALES - GENERAL: PAINLEVEL_OUTOF10: 0

## 2021-12-13 ASSESSMENT — PAIN - FUNCTIONAL ASSESSMENT: PAIN_FUNCTIONAL_ASSESSMENT: 0-10

## 2021-12-13 NOTE — ANESTHESIA PRE PROCEDURE
Department of Anesthesiology  Preprocedure Note       Name:  Keegna Peoples   Age:  70 y.o.  :  1950                                          MRN:  [id number]         Date:  2021      Surgeon: Matthew Eastman):  Harrison Craig MD    Procedure: Procedure(s):  COLONOSCOPY DIAGNOSTIC    Medications prior to admission:   Prior to Admission medications    Medication Sig Start Date End Date Taking?  Authorizing Provider   simvastatin (ZOCOR) 20 MG tablet TAKE ONE TABLET BY MOUTH DAILY 21 Yes Edi Germain PA-C   spironolactone (ALDACTONE) 50 MG tablet TAKE ONE TABLET BY MOUTH DAILY WITH LUNCH 21  Yes Edi Germain PA-C   cloNIDine (CATAPRES) 0.3 MG tablet TAKE ONE TABLET BY MOUTH ONCE NIGHTLY 6/3/21  Yes Arthur Harrison MD   esomeprazole (NEXIUM) 20 MG delayed release capsule Take 2 capsules by mouth daily 10/5/17  Yes Arthur Harrison MD   Coenzyme Q10 (CO Q 10) 100 MG CAPS Take by mouth    Historical Provider, MD   Cholecalciferol (VITAMIN D3) 50 MCG (2000 UT) CAPS Take by mouth 2 tablets daily    Historical Provider, MD   Probiotic Product (PROBIOTIC-10 PO) Take by mouth    Historical Provider, MD   Omega-3 Fatty Acids (FISH OIL) 1000 MG CAPS Take 3,000 mg by mouth daily     Historical Provider, MD   aspirin 81 MG EC tablet Take 81 mg by mouth daily    Historical Provider, MD   Calcium Citrate-Vitamin D (CALCIUM CITRATE + D PO) Take 2 tablets by mouth daily     Historical Provider, MD   Calcium Polycarbophil (FIBER-CAPS PO) Take 5 capsules by mouth daily    Historical Provider, MD   Multiple Vitamin TABS Take 1 tablet by mouth daily    Historical Provider, MD   Ascorbic Acid (VITAMIN C) 500 MG tablet Take 500 mg by mouth daily Indications: take as directed    Historical Provider, MD   Vitamin E 400 UNITS TABS Take 400 Units by mouth Twice a Week Indications: take as directed     Historical Provider, MD       Current medications:    Current Facility-Administered Medications Medication Dose Route Frequency Provider Last Rate Last Admin    lactated ringers infusion   IntraVENous Continuous Justen Silva MD        lidocaine PF 1 % injection 1 mL  1 mL IntraDERmal Once PRN Odilia Solorio MD           Allergies: Allergies   Allergen Reactions    Ciprofloxacin Other (See Comments)     Patient relates only IV    Codeine     Lisinopril     Morphine     Norvasc [Amlodipine Besylate]     Penicillins     Sanctura [Trospium] Nausea Only     Bloated, nauseated    Xarelto [Rivaroxaban]        Problem List:    Patient Active Problem List   Diagnosis Code    Asthma J45.909    Cervical disc disease M50.90    Esophageal reflux K21.9    Fatigue R53.83    Hip joint stiffness M25.659    Hyperlipidemia E78.5    Essential hypertension I10    Hypokalemia E87.6    Joint pain, hip M25.559    Lower back pain M54.50    Muscle spasm M62.838    Nasal congestion R09.81    Neck pain M54.2    Osteoarthritis of hip M16.9    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    Sacroiliac strain S39.012A    Scoliosis M41.9    Shortness of breath R06.02    Snoring R06.83    Tachycardia R00.0    Urinary incontinence R32    Weight gain R63.5    Acute internal derangement of right knee M23.91    BMI 36.0-36.9,adult Z68.36       Past Medical History:        Diagnosis Date    Arthritis     Asthma     \"winter\" asthma    Bronchitis     Constipation     GERD (gastroesophageal reflux disease)     Hyperlipidemia     Hypertension     Hypokalemia     Overactive bladder     Wears glasses        Past Surgical History:        Procedure Laterality Date    BACK SURGERY  1983 and 1992    neck and lower back    BLADDER SURGERY  2006, 2008    2 surgeries: sling surgery first then another sling and tack bladder up surgery    CATARACT REMOVAL WITH IMPLANT Bilateral     COLONOSCOPY  2015    had polyp, done at [facility].  Dr Bertha Roland COLONOSCOPY N/A 8/21/2018    COLONOSCOPY POLYPECTOMY /COLD BX  performed by [facility] Lisette Villa MD at [facility]  02/2017    upper rt back.  KNEE ARTHROSCOPY Right     [place] KNEE SCOPE,DIAGNOSTIC Right 10/4/2018    KNEE ARTHROSCOPY FOR PARTIAL, MEDIAL MENISCECTOMY. performed by Darlene Yo MD at [place] N/A 10/11/2021    AXONICS STIMULATOR INSERTION STAGE 1 (N/A )    STIMULATOR SURGERY N/A 10/11/2021    AXONICS STIMULATOR INSERTION STAGE 1 performed by Mark Jules DO at [place] [address]  10/25/2021     AXONICS STIMULATOR INSERTION STAGE 2     STIMULATOR SURGERY N/A 10/25/2021    AXONICS STIMULATOR INSERTION STAGE 2 performed by Mark Jules DO at [facility] Bilateral 2013, 2015    Dr Nolvia Holbrook History:    Social History     Tobacco Use    Smoking status: Never Smoker    Smokeless tobacco: Never Used   Substance Use Topics    Alcohol use: Yes     Alcohol/week: 0.0 standard drinks     Types: 1 Glasses of wine, 1 Cans of beer, 1 Shots of liquor per week     Comment: 3 times a week                                Counseling given: Not Answered      Vital Signs (Current):   Vitals:    12/13/21 0831   BP: (!) 145/85   Pulse: 78   Resp: 18   Temp: 97.1 °F (36.2 °C)   TempSrc: Infrared   SpO2: 95%   Weight: 205 lb (93 kg)   Height: 5' 4\" (1.626 m)                                              BP Readings from Last 3 Encounters:   12/13/21 (!) 145/85   10/25/21 101/60   10/25/21 114/79       NPO Status: Time of last liquid consumption: 2015                        Time of last solid consumption: 1830                        Date of last liquid consumption: 12/12/21                        Date of last solid food consumption: 12/11/21    BMI:   Wt Readings from Last 3 Encounters:   12/13/21 205 lb (93 kg)   11/29/21 205 lb (93 kg)   10/25/21 212 lb 6.4 oz (96.3 kg)     Body mass index is 35.19 kg/m².     CBC:   Lab Results   Component Value Date    WBC 7.3 09/29/2021    RBC 5.14 09/29/2021    HGB 14.8 09/29/2021    HCT 45.7 09/29/2021    MCV 88.9 09/29/2021    RDW 13.0 09/29/2021     09/29/2021       CMP:   Lab Results   Component Value Date     09/29/2021    K 4.9 09/29/2021     09/29/2021    CO2 24 09/29/2021    BUN 18 09/29/2021    CREATININE 1.11 09/29/2021    GFRAA 59 09/29/2021    LABGLOM 48 09/29/2021    GLUCOSE 98 09/25/2018    PROT 7.5 09/29/2021    CALCIUM 9.6 09/29/2021    BILITOT 0.48 09/29/2021    ALKPHOS 89 09/29/2021    AST 18 09/29/2021    ALT 16 09/29/2021       POC Tests: No results for input(s): POCGLU, POCNA, POCK, POCCL, POCBUN, POCHEMO, POCHCT in the last 72 hours. Coags: No results found for: PROTIME, INR, APTT    HCG (If Applicable): No results found for: PREGTESTUR, PREGSERUM, HCG, HCGQUANT     ABGs: No results found for: PHART, PO2ART, KGE8KBG, JRZ0NTO, BEART, R4YLTMUN     Type & Screen (If Applicable):  No results found for: LABABO, LABRH    Drug/Infectious Status (If Applicable):  No results found for: HIV, HEPCAB    COVID-19 Screening (If Applicable):   Lab Results   Component Value Date    COVID19 Not Detected 10/11/2021           Anesthesia Evaluation  Patient summary reviewed and Nursing notes reviewed no history of anesthetic complications:   Airway: Mallampati: II  TM distance: >3 FB   Neck ROM: full  Mouth opening: > = 3 FB Dental:          Pulmonary:normal exam  breath sounds clear to auscultation  (+) asthma:     (-) shortness of breath                           Cardiovascular:    (+) hypertension:, hyperlipidemia    (-)  angina    ECG reviewed  Rhythm: regular  Rate: normal                    Neuro/Psych:   Negative Neuro/Psych ROS              GI/Hepatic/Renal:   (+) GERD:, bowel prep,           Endo/Other:    (+) : arthritis:., .                 Abdominal:             Vascular: Other Findings:             Anesthesia Plan      general     ASA 2       Induction: intravenous.     MIPS: Prophylactic antiemetics administered. Anesthetic plan and risks discussed with patient. Plan discussed with CRNA.                   Quinn Kidd MD   12/13/2021

## 2021-12-13 NOTE — OP NOTE
Operative Note      Patient: Efe Bhakta  YOB: 1950  MRN: [id number]    Date of Procedure: 12/13/2021    PROCEDURE NOTE    DATE OF PROCEDURE: 12/13/2021    SURGEON: Jose Alejandro Brice MD    ASSISTANT: None    PREOPERATIVE DIAGNOSIS: History of colon polyp    POSTOPERATIVE DIAGNOSIS: Redundant colon. Sigmoid diverticulosis. Prominent external hemorrhoid. Grade 1 internal hemorrhoid. Possible ascending colon polyp    OPERATION: Total colonoscopy to cecum with hot snare polypectomy proximal ascending colon polyp    ANESTHESIA: General    ESTIMATED BLOOD LOSS: None    COMPLICATIONS: None     SPECIMENS:  Was Obtained: Proximal ascending colon polyp    HISTORY: The patient is a 70y.o. year old female with history of above preop diagnosis. I recommended colonoscopy with possible biopsy or polypectomy and I explained the risk, benefits, expected outcome, and alternatives to the procedure. Risks included but are not limited to bleeding, infection, respiratory distress, hypotension, and perforation of the colon and possibility of missing a lesion. The patient understands and is in agreement. PROCEDURE: The patient was given IV conscious sedation. The patient's SPO2 remained above 90% throughout the procedure. Digital rectal exam was normal.  The colonoscope was inserted through the anus into the rectum and advanced under direct vision to the cecum without difficulty. Terminal ileum was examined for approximately 2 inches. The prep was good. Findings:    Cecum/Ascending colon: abnormal: Proximal ascending colon polyp removed and retrieved with hot snare polypectomy technique    Transverse colon: normal    Descending/Sigmoid colon: abnormal: Sigmoid diverticulosis    Rectum/Anus: examined in normal and retroflexed positions and was abnormal: Grade 1 internal hemorrhoid. Prominent external hemorrhoid. Withdrawal Time was (minutes): 20      Next screening colonoscopy: 3 years.   If screening is less than 10 years the recommended reason is due:polyps    The colon was decompressed. While withdrawing the scope the above findings were verified and the scope was removed. The patient tolerated the procedure and conscious sedation without unusual events. In the recovery room patient was examined and remains hemodynamically stable. Discharge home when criteria met. Recommendations/Plan:   1. F/U Biopsies  2. F/U In Office as instructed  3. Discussed with the family  4. High fiber diet   5.  Precautions to avoid constipation    Electronically signed by Amy Reyes MD  on 12/13/2021 at 9:52 AM

## 2021-12-13 NOTE — ANESTHESIA POSTPROCEDURE EVALUATION
Department of Anesthesiology  Postprocedure Note    Patient: Darlene Roman  MRN: [id number]  YOB: 1950  Date of evaluation: 12/13/2021  Time:  1:57 PM     Procedure Summary     Date: 12/13/21 Room / Location: [facility] ENDO 04 / [facility] ENDO    Anesthesia Start: 0932 Anesthesia Stop: 7096    Procedure: COLONOSCOPY POLYPECTOMY SNARE/COLD BIOPSY (N/A Anus) Diagnosis: (HISTORY OF COLON POLYPS    (PT  HAS HAD COVID VACCINE))    Surgeons: Rica Wright MD Responsible Provider: Shola Garcia MD    Anesthesia Type: general ASA Status: 2          Anesthesia Type: general    Kevin Phase I: Kevin Score: 10    Kevin Phase II:      Last vitals: Reviewed and per EMR flowsheets.        Anesthesia Post Evaluation    Comments: POST- ANESTHESIA EVALUATION       Pt Name: Darlene Roman  MRN: [id number]  YOB: 1950  Date of evaluation: 12/13/2021  Time:  1:57 PM      BP (!) 147/94   Pulse 70   Temp 98 °F (36.7 °C) (Infrared)   Resp 14   Ht 5' 4\" (1.626 m)   Wt 205 lb (93 kg)   SpO2 97%   BMI 35.19 kg/m²      Consciousness Level  Awake  Cardiopulmonary Status  Stable  Pain Adequately Treated YES  Nausea / Vomiting  NO  Adequate Hydration  YES  Anesthesia Related Complications NONE      Electronically signed by Shola Garcia MD on 12/13/2021 at 1:57 PM

## 2021-12-13 NOTE — H&P
HISTORY and [place] [address]       NAME:  Keegan Peoples  MRN: [id number]   YOB: 1950   Date: 12/13/2021   Age: 70 y.o. Gender: female       COMPLAINT AND PRESENT HISTORY:   Keegan Peoples  is a 70 y.o. female presenting today for a diagnostic colonoscopy. Pt reports having colonoscopies in the past with polyps removed. She denies any family hx of colon cancer. Pt denies any gi symptoms including n/v/d, no abdominal pain, no bloody or tarry stools. She does have occasional constipation for which she does take senna for symptom. She also c/o some GERD. Pt reports completing bowel prep without complications, last BM reported this morning. She states last BM was clear with no stool particles. Pt has a PMHX significant for asthma, HTN, HLD she does have a bladder stimulator. NPO since midnight. Pt does not wear dentures. Pt denies any hx of MRSA infection  Pt not currently taking any blood thinners or anticoagulants  Pt denies any personal or FHx of complications with anesthesia. Pt denies any acute symptoms of illness at this time including no SOB, CP, fever, URI or UTI symptoms. RECENT IMAGING    JORGE NATO DIGITAL SCREEN BILATERAL    Result Date: 12/7/2021  Unremarkable study of the breasts. No evidence of significant interval change. BIRADS: BIRADS - CATEGORY 1 Negative, no evidence of malignancy. Normal interval follow-up is recommended in 12 months. OVERALL ASSESSMENT - NEGATIVE A letter of notification will be sent to the patient regarding the results. The [employer] [facility] [employer] recommends annual mammograms for women 40 years and older.         PAST MEDICAL HISTORY     Past Medical History:   Diagnosis Date    Arthritis     Asthma     \"winter\" asthma    Bronchitis     Constipation     GERD (gastroesophageal reflux disease)     Hyperlipidemia     Hypertension     Hypokalemia     Overactive bladder     Wears glasses        SURGICAL HISTORY Past Surgical History:   Procedure Laterality Date    BACK SURGERY  1983 and 1992    neck and lower back    BLADDER SURGERY  2006, 2008    2 surgeries: sling surgery first then another sling and tack bladder up surgery    CATARACT REMOVAL WITH IMPLANT Bilateral     COLONOSCOPY  2015    had polyp, done at [facility]. Dr Miguelangel Sheridan 8/21/2018    COLONOSCOPY POLYPECTOMY /COLD BX  performed by Jose Alejandro Brice MD at [address] [place] [address]  02/2017    upper rt back.  KNEE ARTHROSCOPY Right     [place] KNEE SCOPE,DIAGNOSTIC Right 10/4/2018    KNEE ARTHROSCOPY FOR PARTIAL, MEDIAL MENISCECTOMY. performed by Mateusz Bland MD at [place] N/A 10/11/2021    AXONICS STIMULATOR INSERTION STAGE 1 (N/A )    STIMULATOR SURGERY N/A 10/11/2021    AXONICS STIMULATOR INSERTION STAGE 1 performed by Radha Armstrong DO at [place] [address]  10/25/2021     AXONICS STIMULATOR INSERTION STAGE 2     STIMULATOR SURGERY N/A 10/25/2021    AXONICS STIMULATOR INSERTION STAGE 2 performed by Radha Armstrong DO at [address] [place] [address] Bilateral 2013, 2015    Dr Diony Paredes History   Problem Relation Age of Onset    Hypertension Mother     Fainting Mother     Hypertension Sister     Heart Disease Maternal Grandmother        SOCIAL HISTORY       Social History     Socioeconomic History    Marital status:      Spouse name: Not on file    Number of children: Not on file    Years of education: Not on file    Highest education level: Not on file   Occupational History    Not on file   Tobacco Use    Smoking status: Never Smoker    Smokeless tobacco: Never Used   Vaping Use    Vaping Use: Never used   Substance and Sexual Activity    Alcohol use:  Yes     Alcohol/week: 0.0 standard drinks     Types: 1 Glasses of wine, 1 Cans of beer, 1 Shots of liquor per week     Comment: 3 times a week    Drug use: No    Sexual activity: Not on file   Other Topics Concern    Not on file   Social History Narrative    Not on file     Social Determinants of Health     Financial Resource Strain: Low Risk     Difficulty of Paying Living Expenses: Not very hard   Food Insecurity: No Food Insecurity    Worried About Running Out of Food in the Last Year: Never true    Joselyn of Food in the Last Year: Never true   Transportation Needs: No Transportation Needs    Lack of Transportation (Medical): No    Lack of Transportation (Non-Medical): No   Physical Activity:     Days of Exercise per Week: Not on file    Minutes of Exercise per Session: Not on file   Stress:     Feeling of Stress : Not on file   Social Connections:     Frequency of Communication with Friends and Family: Not on file    Frequency of Social Gatherings with Friends and Family: Not on file    Attends Christianity Services: Not on file    Active Member of [address] [place] [address] [employer] or Organizations: Not on file    Attends Club or Organization Meetings: Not on file    Marital Status: Not on file   Intimate Partner Violence:     Fear of Current or Ex-Partner: Not on file    Emotionally Abused: Not on file    Physically Abused: Not on file    Sexually Abused: Not on file   Housing Stability:     Unable to Pay for Housing in the Last Year: Not on file    Number of [place] in the Last Year: Not on file    Unstable Housing in the Last Year: Not on file           REVIEW OF SYSTEMS      Allergies   Allergen Reactions    Ciprofloxacin Other (See Comments)     Patient relates only IV    Codeine     Lisinopril     Morphine     Norvasc [Amlodipine Besylate]     Penicillins     Sanctura [Trospium] Nausea Only     Bloated, nauseated    Xarelto [Rivaroxaban]        No current facility-administered medications on file prior to encounter.      Current Outpatient Medications on File Prior to Encounter   Medication Sig Dispense Refill    Coenzyme Q10 (CO Q 10) 100 MG CAPS Take by mouth      simvastatin (ZOCOR) 20 MG tablet TAKE ONE TABLET BY MOUTH DAILY 90 tablet 0    spironolactone (ALDACTONE) 50 MG tablet TAKE ONE TABLET BY MOUTH DAILY WITH LUNCH 90 tablet 0    Cholecalciferol (VITAMIN D3) 50 MCG (2000 UT) CAPS Take by mouth 2 tablets daily      Probiotic Product (PROBIOTIC-10 PO) Take by mouth      Omega-3 Fatty Acids (FISH OIL) 1000 MG CAPS Take 3,000 mg by mouth daily       aspirin 81 MG EC tablet Take 81 mg by mouth daily      cloNIDine (CATAPRES) 0.3 MG tablet TAKE ONE TABLET BY MOUTH ONCE NIGHTLY 30 tablet 5    Calcium Citrate-Vitamin D (CALCIUM CITRATE + D PO) Take 2 tablets by mouth daily       Calcium Polycarbophil (FIBER-CAPS PO) Take 5 capsules by mouth daily      esomeprazole (NEXIUM) 20 MG delayed release capsule Take 2 capsules by mouth daily 60 capsule 3    Multiple Vitamin TABS Take 1 tablet by mouth daily      Ascorbic Acid (VITAMIN C) 500 MG tablet Take 500 mg by mouth daily Indications: take as directed      Vitamin E 400 UNITS TABS Take 400 Units by mouth Twice a Week Indications: take as directed           Review of Systems   Constitutional: Negative for chills, diaphoresis, fatigue and fever. HENT: Negative for congestion, dental problem, ear pain, postnasal drip, rhinorrhea, sinus pressure, sinus pain, sore throat and trouble swallowing. Eyes: Positive for visual disturbance. Glasses   Respiratory: Negative for apnea, cough, chest tightness, shortness of breath and wheezing. Cardiovascular: Negative for chest pain, palpitations and leg swelling. Genitourinary: Negative for dysuria, flank pain, frequency and hematuria. Reports an asymptomatic bladder infection, was on macrobid. Musculoskeletal: Negative for back pain, joint swelling and myalgias. Skin: Negative for rash and wound. Neurological: Negative for dizziness, weakness, numbness and headaches. Hematological: Does not bruise/bleed easily. Psychiatric/Behavioral: Negative for agitation and confusion. The patient is not nervous/anxious. See HPI    GENERAL PHYSICAL EXAM:     Vitals:See nurse flowsheet for current vitals. Physical Exam  Constitutional:       General: She is not in acute distress. Appearance: Normal appearance. She is well-developed. She is obese. She is not ill-appearing or toxic-appearing. HENT:      Head: Normocephalic and atraumatic. Mouth/Throat:      Mouth: Mucous membranes are moist.      Pharynx: Oropharynx is clear. No oropharyngeal exudate or posterior oropharyngeal erythema. Eyes:      Extraocular Movements: Extraocular movements intact. Conjunctiva/sclera: Conjunctivae normal.      Pupils: Pupils are equal, round, and reactive to light. Comments: +glasses   Cardiovascular:      Rate and Rhythm: Normal rate and regular rhythm. Pulses: Normal pulses. Heart sounds: Normal heart sounds. No murmur heard. No friction rub. No gallop. Pulmonary:      Effort: Pulmonary effort is normal.      Breath sounds: Normal breath sounds. No wheezing. Abdominal:      General: Bowel sounds are normal. There is no distension. Palpations: Abdomen is soft. Tenderness: There is no abdominal tenderness. There is no guarding or rebound. Comments: Hyperactive bs   Musculoskeletal:         General: No swelling. Normal range of motion. Cervical back: Normal range of motion and neck supple. No rigidity or tenderness. Right lower leg: No edema. Left lower leg: No edema. Skin:     General: Skin is warm and dry. Findings: No erythema. Neurological:      General: No focal deficit present. Mental Status: She is alert and oriented to person, place, and time. Mental status is at baseline. Sensory: No sensory deficit. Psychiatric:         Mood and Affect: Mood normal.         Behavior: Behavior normal.         Thought Content:  Thought content normal. Judgment: Judgment normal.                                                                                         PROVISIONAL DIAGNOSES / SURGERY:      Diagnostic colonoscopy  History of colon cancer    Patient Active Problem List    Diagnosis Date Noted    BMI 36.0-36.9,adult 02/25/2020    Acute internal derangement of right knee 08/06/2018    Asthma 11/06/2015    Cervical disc disease 11/06/2015    Esophageal reflux 11/06/2015    Fatigue 11/06/2015    Hip joint stiffness 11/06/2015    Hyperlipidemia 11/06/2015    Essential hypertension 11/06/2015    Hypokalemia 11/06/2015    Joint pain, hip 11/06/2015    Lower back pain 11/06/2015    Muscle spasm 11/06/2015    Nasal congestion 11/06/2015    Neck pain 11/06/2015    Osteoarthritis of hip 11/06/2015    Osteoarthritis of neck 11/06/2015    Sacroiliac strain 11/06/2015    Scoliosis 11/06/2015    Shortness of breath 11/06/2015    Snoring 11/06/2015    Tachycardia 11/06/2015    Urinary incontinence 11/06/2015    Weight gain 11/06/2015               YARA Song - CNP on 12/13/2021 at 8:14 AM

## 2021-12-14 PROBLEM — D12.6 TUBULAR ADENOMA OF COLON: Chronic | Status: ACTIVE | Noted: 2021-12-14

## 2021-12-14 LAB — SURGICAL PATHOLOGY REPORT: NORMAL

## 2021-12-22 DIAGNOSIS — I10 ESSENTIAL HYPERTENSION: ICD-10-CM

## 2021-12-22 DIAGNOSIS — E78.2 MIXED HYPERLIPIDEMIA: ICD-10-CM

## 2021-12-22 LAB
CHOLESTEROL/HDL RATIO: 3.3
CHOLESTEROL: 180 MG/DL
HDLC SERPL-MCNC: 55 MG/DL
LDL CHOLESTEROL: 92 MG/DL (ref 0–130)
TRIGL SERPL-MCNC: 165 MG/DL
VLDLC SERPL CALC-MCNC: ABNORMAL MG/DL (ref 1–30)

## 2021-12-29 DIAGNOSIS — I10 ESSENTIAL HYPERTENSION: ICD-10-CM

## 2021-12-29 RX ORDER — CLONIDINE HYDROCHLORIDE 0.3 MG/1
TABLET ORAL
Qty: 90 TABLET | Refills: 3 | Status: SHIPPED | OUTPATIENT
Start: 2021-12-29 | End: 2022-01-10 | Stop reason: SDUPTHER

## 2022-01-04 DIAGNOSIS — I10 ESSENTIAL HYPERTENSION: ICD-10-CM

## 2022-01-04 RX ORDER — SPIRONOLACTONE 50 MG/1
TABLET, FILM COATED ORAL
Qty: 90 TABLET | Refills: 0 | Status: SHIPPED | OUTPATIENT
Start: 2022-01-04 | End: 2022-01-12

## 2022-01-10 DIAGNOSIS — I10 ESSENTIAL HYPERTENSION: ICD-10-CM

## 2022-01-10 RX ORDER — SIMVASTATIN 20 MG
TABLET ORAL
Qty: 90 TABLET | Refills: 3 | Status: SHIPPED | OUTPATIENT
Start: 2022-01-10

## 2022-01-10 RX ORDER — CLONIDINE HYDROCHLORIDE 0.3 MG/1
TABLET ORAL
Qty: 90 TABLET | Refills: 3 | Status: SHIPPED | OUTPATIENT
Start: 2022-01-10

## 2022-01-10 NOTE — TELEPHONE ENCOUNTER
Pt stopped at check in, stating the clonidine was sent to a mail service on 12/29/21, she states she does not use mail service and has never used a mail service before. She is going to call them and tell them to not send. She only has 1 tab left of each medication pended.  Request was from a pharmacy fax sent   I will call mail service to cancel and stop sending request       Please approve or deny       Pharm- [facility] [address]

## 2022-02-09 ENCOUNTER — OFFICE VISIT (OUTPATIENT)
Dept: ORTHOPEDIC SURGERY | Age: 72
End: 2022-02-09
Payer: MEDICARE

## 2022-02-09 DIAGNOSIS — M25.552 LEFT HIP PAIN: Primary | ICD-10-CM

## 2022-02-09 DIAGNOSIS — M25.562 LEFT KNEE PAIN, UNSPECIFIED CHRONICITY: ICD-10-CM

## 2022-02-09 PROCEDURE — 1123F ACP DISCUSS/DSCN MKR DOCD: CPT | Performed by: ORTHOPAEDIC SURGERY

## 2022-02-09 PROCEDURE — 1036F TOBACCO NON-USER: CPT | Performed by: ORTHOPAEDIC SURGERY

## 2022-02-09 PROCEDURE — G8428 CUR MEDS NOT DOCUMENT: HCPCS | Performed by: ORTHOPAEDIC SURGERY

## 2022-02-09 PROCEDURE — G8417 CALC BMI ABV UP PARAM F/U: HCPCS | Performed by: ORTHOPAEDIC SURGERY

## 2022-02-09 PROCEDURE — G8484 FLU IMMUNIZE NO ADMIN: HCPCS | Performed by: ORTHOPAEDIC SURGERY

## 2022-02-09 PROCEDURE — 99213 OFFICE O/P EST LOW 20 MIN: CPT | Performed by: ORTHOPAEDIC SURGERY

## 2022-02-09 PROCEDURE — G8399 PT W/DXA RESULTS DOCUMENT: HCPCS | Performed by: ORTHOPAEDIC SURGERY

## 2022-02-09 PROCEDURE — 3017F COLORECTAL CA SCREEN DOC REV: CPT | Performed by: ORTHOPAEDIC SURGERY

## 2022-02-09 PROCEDURE — 20610 DRAIN/INJ JOINT/BURSA W/O US: CPT | Performed by: ORTHOPAEDIC SURGERY

## 2022-02-09 PROCEDURE — 1090F PRES/ABSN URINE INCON ASSESS: CPT | Performed by: ORTHOPAEDIC SURGERY

## 2022-02-09 PROCEDURE — 4040F PNEUMOC VAC/ADMIN/RCVD: CPT | Performed by: ORTHOPAEDIC SURGERY

## 2022-02-09 RX ORDER — LIDOCAINE HYDROCHLORIDE 10 MG/ML
2 INJECTION, SOLUTION INFILTRATION; PERINEURAL ONCE
Status: COMPLETED | OUTPATIENT
Start: 2022-02-09 | End: 2022-02-09

## 2022-02-09 RX ORDER — BUPIVACAINE HYDROCHLORIDE 5 MG/ML
2 INJECTION, SOLUTION PERINEURAL ONCE
Status: COMPLETED | OUTPATIENT
Start: 2022-02-09 | End: 2022-02-09

## 2022-02-09 RX ORDER — BETAMETHASONE SODIUM PHOSPHATE AND BETAMETHASONE ACETATE 3; 3 MG/ML; MG/ML
12 INJECTION, SUSPENSION INTRA-ARTICULAR; INTRALESIONAL; INTRAMUSCULAR; SOFT TISSUE ONCE
Status: COMPLETED | OUTPATIENT
Start: 2022-02-09 | End: 2022-02-09

## 2022-02-09 RX ADMIN — BETAMETHASONE SODIUM PHOSPHATE AND BETAMETHASONE ACETATE 12 MG: 3; 3 INJECTION, SUSPENSION INTRA-ARTICULAR; INTRALESIONAL; INTRAMUSCULAR; SOFT TISSUE at 10:37

## 2022-02-09 RX ADMIN — LIDOCAINE HYDROCHLORIDE 2 ML: 10 INJECTION, SOLUTION INFILTRATION; PERINEURAL at 10:40

## 2022-02-09 RX ADMIN — BUPIVACAINE HYDROCHLORIDE 10 MG: 5 INJECTION, SOLUTION PERINEURAL at 10:39

## 2022-02-09 NOTE — PROGRESS NOTES
Shaila Orlando M.D.            [address] [place] [address]., [address] [facility],[address], [facility] [address].           Dept Phone: [phone]           Dept Fax:  [address], [place]          Dept Phone: [phone]           Dept Fax:  [phone]      Chief Compliant:  Chief Complaint   Patient presents with    Pain     Lt knee        History of Present Illness: This is a 70 y.o. female who presents to the clinic today for evaluation / follow up of left leg pain. Patient is 54-year-old patient has a history having a right total hip arthroplasty done by me years ago as well as her right knee arthroscopy. She has no complaints of these. She states that last week when she was at the store and she felt a snapping sensation in her shoe which she felt like on the outside of her leg. She points to the lateral aspect of her knee and may be going up to the side of the bed is gotten somewhat better for her but she still having some discomfort walking. Does not really point to the medial side of her knee where it bothers her. .       Review of Systems   Constitutional: Negative for fever, chills, sweats. Eyes: Negative for changes in vision, or pain. HENT: Negative for ear ache, epistaxis, or sore throat. Respiratory/Cardio: Negative for Chest pain, palpitations, SOB, or cough. Gastrointestinal: Negative for abdominal pain, N/V/D. Genitourinary: Negative for dysuria, frequency, urgency, or hematuria. Neurological: Negative for headache, numbness, or weakness. Integumentary: Negative for rash, itching, laceration, or abrasion. Musculoskeletal: Positive for Pain (Lt knee)       Physical Exam:  Constitutional: Patient is oriented to person, place, and time. Patient appears well-developed and well nourished.    HENT: Negative otherwise noted  Head: Normocephalic and Atraumatic  Nose: Normal  Eyes: Conjunctivae and EOM are normal  Neck: Normal range of motion Neck supple. Respiratory/Cardio: Effort normal. No respiratory distress. Musculoskeletal: Examination patient left knee does not note any obvious effusion. Knee motion is 0 to 130 degrees. Manny's is minimally positive medially. Collaterals cruciates are appropriate. Patient has some mild discomfort on rotation of her hip but she is quite tender over her greater trochanter and this can be expressed down her IT band as well. She has a positive Anthony's as well. Neurological: Patient is alert and oriented to person, place, and time. Normal strenght. No sensory deficit. Skin: Skin is warm and dry  Psychiatric: Behavior is normal. Thought content normal.  Nursing note and vitals reviewed. Labs and Imaging:     XR taken today:  XR KNEE LEFT (1-2 VIEWS)    Result Date: 2/9/2022  X-rays taken a reviewed by me show standing AP both knees and lateral left knee. Patient has very minimal joint space narrowing of the medial compartment left knee. No acute process is noted. Orders Placed This Encounter   Procedures    XR KNEE LEFT (1-2 VIEWS)     Standing Status:   Future     Number of Occurrences:   1     Standing Expiration Date:   2/8/2023  20610 - DRAIN/INJECT LARGE JOINT BURSA       Assessment and Plan:  1. Left hip pain    2. Left knee pain, unspecified chronicity    3. History of total hip replacement right  4       history knee arthroscopy right  5.       Trochanteric bursitis left hip    Administrations This Visit     betamethasone acetate-betamethasone sodium phosphate (CELESTONE) injection 12 mg     Admin Date  02/09/2022  10:37 Action  Given Dose  12 mg Route  Intra-artICUlar Site  Hip Left Administered By  Gail Ernandez LPN    Ordering Provider: Sheng Sanders MD    NDC: 1536-8647-08    Lot#: [id number]    : [employer]    Patient Supplied?: No bupivacaine (MARCAINE) 0.5 % injection 10 mg     Admin Date  02/09/2022  10:39 Action  Given Dose  10 mg Route  Intra-artICUlar Site  Hip Left Administered By  Sharon Rojas LPN    Ordering Provider: Sherian Leyden, MD    NDC: 6965-3999-05    Lot#: [id number]    : H[facility]IRA    Patient Supplied?: No          lidocaine 1 % injection 2 mL     Admin Date  02/09/2022  10:40 Action  Given Dose  2 mL Route  Intra-artICUlar Site  Hip Left Administered By  Sharon Rojas LPN    Ordering Provider: Sherian Leyden, MD    [address]: 7502-9270-20    Lot#: [id number]    : [id number] [facility]    Patient Supplied?: No                This is a 70 y.o. female who presents to the clinic today for evaluation / follow up of trochanteric bursitis left hip.      Past History:    Current Outpatient Medications:     spironolactone (ALDACTONE) 50 MG tablet, TAKE ONE TABLET BY MOUTH DAILY WITH LUNCH, Disp: 90 tablet, Rfl: 2    cloNIDine (CATAPRES) 0.3 MG tablet, TAKE ONE TABLET BY MOUTH ONCE NIGHTLY, Disp: 90 tablet, Rfl: 3    simvastatin (ZOCOR) 20 MG tablet, TAKE ONE TABLET BY MOUTH DAILY, Disp: 90 tablet, Rfl: 3    Coenzyme Q10 (CO Q 10) 100 MG CAPS, Take by mouth, Disp: , Rfl:     Cholecalciferol (VITAMIN D3) 50 MCG (2000 UT) CAPS, Take by mouth 2 tablets daily, Disp: , Rfl:     Probiotic Product (PROBIOTIC-10 PO), Take by mouth, Disp: , Rfl:     Omega-3 Fatty Acids (FISH OIL) 1000 MG CAPS, Take 3,000 mg by mouth daily , Disp: , Rfl:     aspirin 81 MG EC tablet, Take 81 mg by mouth daily, Disp: , Rfl:     Calcium Citrate-Vitamin D (CALCIUM CITRATE + D PO), Take 2 tablets by mouth daily , Disp: , Rfl:     Calcium Polycarbophil (FIBER-CAPS PO), Take 5 capsules by mouth daily, Disp: , Rfl:     esomeprazole (NEXIUM) 20 MG delayed release capsule, Take 2 capsules by mouth daily, Disp: 60 capsule, Rfl: 3    Multiple Vitamin TABS, Take 1 tablet by mouth daily, Disp: , Rfl:     Ascorbic Acid (VITAMIN C) 500 MG tablet, Take 500 mg by mouth daily Indications: take as directed, Disp: , Rfl:     Vitamin E 400 UNITS TABS, Take 400 Units by mouth Twice a Week Indications: take as directed , Disp: , Rfl:   Allergies   Allergen Reactions    Ciprofloxacin Other (See Comments)     Patient relates only IV    Codeine     Lisinopril     Morphine     Norvasc [Amlodipine Besylate]     Penicillins     Sanctura [Trospium] Nausea Only     Bloated, nauseated    Xarelto [Rivaroxaban]      Social History     Socioeconomic History    Marital status:      Spouse name: Not on file    Number of children: Not on file    Years of education: Not on file    Highest education level: Not on file   Occupational History    Not on file   Tobacco Use    Smoking status: Never Smoker    Smokeless tobacco: Never Used   Vaping Use    Vaping Use: Never used   Substance and Sexual Activity    Alcohol use: Yes     Alcohol/week: 0.0 standard drinks     Types: 1 Glasses of wine, 1 Cans of beer, 1 Shots of liquor per week     Comment: 3 times a week    Drug use: No    Sexual activity: Not on file   Other Topics Concern    Not on file   Social History Narrative    Not on file     Social Determinants of Health     Financial Resource Strain: Low Risk     Difficulty of Paying Living Expenses: Not very hard   Food Insecurity: No Food Insecurity    Worried About Running Out of Food in the Last Year: Never true    Joselyn of Food in the Last Year: Never true   Transportation Needs: No Transportation Needs    Lack of Transportation (Medical): No    Lack of Transportation (Non-Medical):  No   Physical Activity:     Days of Exercise per Week: Not on file    Minutes of Exercise per Session: Not on file   Stress:     Feeling of Stress : Not on file   Social Connections:     Frequency of Communication with Friends and Family: Not on file    Frequency of Social Gatherings with Friends and Family: Not on file    Attends [employer] Services: Not on file   Iveth Lim Active Member of Clubs or Organizations: Not on file    Attends Club or Organization Meetings: Not on file    Marital Status: Not on file   Intimate Partner Violence:     Fear of Current or Ex-Partner: Not on file    Emotionally Abused: Not on file    Physically Abused: Not on file    Sexually Abused: Not on file   Housing Stability:     Unable to Pay for Housing in the Last Year: Not on file    Number of [place] in the Last Year: Not on file    Unstable Housing in the Last Year: Not on file     Past Medical History:   Diagnosis Date    Arthritis     Asthma     \"winter\" asthma    Bronchitis     Constipation     GERD (gastroesophageal reflux disease)     Hyperlipidemia     Hypertension     Hypokalemia     Overactive bladder     Wears glasses      Past Surgical History:   Procedure Laterality Date   [address] and [facility]    neck and lower back    BLADDER SURGERY  2006, 2008    2 surgeries: sling surgery first then another sling and tack bladder up surgery    CATARACT REMOVAL WITH IMPLANT Bilateral     COLONOSCOPY  2015    had polyp, done at [facility]. Dr Kristal Reyes 8/21/2018    COLONOSCOPY POLYPECTOMY /COLD BX  performed by Carlos Alberto Moeller MD at [address] [place] [address] COLONOSCOPY N/A 12/13/2021    COLONOSCOPY POLYPECTOMY SNARE/COLD BIOPSY performed by Carlos Alberto Moeller MD at [address] [place] [address]  02/2017    upper rt back.  KNEE ARTHROSCOPY Right     [place] KNEE SCOPE,DIAGNOSTIC Right 10/4/2018    KNEE ARTHROSCOPY FOR PARTIAL, MEDIAL MENISCECTOMY.  performed by Charlene Mandujano MD at [address] [facility] [address] N/A 10/11/2021    AXONICS STIMULATOR INSERTION STAGE 1 (N/A )    STIMULATOR SURGERY N/A 10/11/2021    AXONICS STIMULATOR INSERTION STAGE 1 performed by Marcellus Osborn DO at [place] [address]  10/25/2021     AXONICS STIMULATOR INSERTION STAGE 2     STIMULATOR SURGERY N/A 10/25/2021    AXONICS STIMULATOR INSERTION STAGE 2 performed by Juanita Gonzalez Amie Jean DO at [facility] Bilateral 2013, 2015    Dr María Martinez History   Problem Relation Age of Onset    Hypertension Mother     Fainting Mother     Hypertension Sister     Heart Disease Maternal Grandmother    Plan  An informed verbal consent for the procedure was obtained and risks including, but not limited to: allergy to medications, injection, bleeding, stiffness of joint, recurrence of symptoms, loss of function, swelling, drainage, irrigation, need for surgery and pseudo-septic inflammation, were explained to the patient. Also, discussed was the potential for further injections, irrigation and debridement and surgery. Alternate means of treatment have also been discussed with the patient. Administrations This Visit     betamethasone acetate-betamethasone sodium phosphate (CELESTONE) injection 12 mg     Admin Date  02/09/2022  10:37 Action  Given Dose  12 mg Route  Intra-artICUlar Site  Hip Left Administered By  Vidal Luz LPN    Ordering Provider: Cesar Tong MD    ND: 7842-2047-12    Lot#: [id number]    : [employer]    Patient Supplied?: No          bupivacaine (MARCAINE) 0.5 % injection 10 mg     Admin Date  02/09/2022  10:39 Action  Given Dose  10 mg Route  Intra-artICUlar Site  Hip Left Administered By  Vidal Luz LPN    Ordering Provider: Cesar Tong MD    NDC: 2023-4461-70    Lot#: [id number]    : HOSPIRA    Patient Supplied?: No          lidocaine 1 % injection 2 mL     Admin Date  02/09/2022  10:40 Action  Given Dose  2 mL Route  Intra-artICUlar Site  Hip Left Administered By  Vidal Luz LPN    Ordering Provider: Cesar Tong MD    [address]: [id number]    Lot#: [id number]    : [id number] [facility]    Patient Supplied?: No            Under sterile conditions the patient's left greater trochanter point of maximal tenderness injected with lidocaine 2 cc bupivacaine 2 cc and Celestone 2 cc. She tolerated procedure well    Patient was given a home stretching program for trochanteric bursitis. Anticipated doing well with this. Call if she continues have problems otherwise back here for routine follow-up for her total hip arthroplasty                    Provider Attestation:  Nain Green, personally performed the services described in this documentation. All medical record entries made by the scribe were at my direction and in my presence. I have reviewed the chart and discharge instructions and agree that the records reflect my personal performance and is accurate and complete. Gabriella Singh MD. 02/09/22      Please note that this chart was generated using voice recognition Dragon dictation software. Although every effort was made to ensure the accuracy of this automated transcription, some errors in transcription may have occurred.

## 2022-03-14 ENCOUNTER — OFFICE VISIT (OUTPATIENT)
Dept: PRIMARY CARE CLINIC | Age: 72
End: 2022-03-14
Payer: MEDICARE

## 2022-03-14 VITALS
WEIGHT: 209 LBS | HEART RATE: 82 BPM | BODY MASS INDEX: 35.68 KG/M2 | SYSTOLIC BLOOD PRESSURE: 142 MMHG | HEIGHT: 64 IN | OXYGEN SATURATION: 97 % | DIASTOLIC BLOOD PRESSURE: 88 MMHG

## 2022-03-14 DIAGNOSIS — E78.2 MIXED HYPERLIPIDEMIA: ICD-10-CM

## 2022-03-14 DIAGNOSIS — E66.01 SEVERE OBESITY (BMI 35.0-39.9) WITH COMORBIDITY (HCC): ICD-10-CM

## 2022-03-14 DIAGNOSIS — E78.5 HYPERLIPIDEMIA, UNSPECIFIED HYPERLIPIDEMIA TYPE: ICD-10-CM

## 2022-03-14 DIAGNOSIS — K59.09 CHRONIC CONSTIPATION: ICD-10-CM

## 2022-03-14 DIAGNOSIS — I10 ESSENTIAL HYPERTENSION: Primary | ICD-10-CM

## 2022-03-14 PROBLEM — N32.81 OVERACTIVE BLADDER: Status: ACTIVE | Noted: 2022-03-14

## 2022-03-14 PROBLEM — N76.3 CHRONIC VULVITIS: Status: ACTIVE | Noted: 2022-03-14

## 2022-03-14 PROBLEM — N95.2 POSTMENOPAUSAL ATROPHIC VAGINITIS: Status: ACTIVE | Noted: 2022-03-14

## 2022-03-14 PROCEDURE — 99213 OFFICE O/P EST LOW 20 MIN: CPT | Performed by: FAMILY MEDICINE

## 2022-03-14 PROCEDURE — 1123F ACP DISCUSS/DSCN MKR DOCD: CPT | Performed by: FAMILY MEDICINE

## 2022-03-14 PROCEDURE — G8427 DOCREV CUR MEDS BY ELIG CLIN: HCPCS | Performed by: FAMILY MEDICINE

## 2022-03-14 PROCEDURE — 4040F PNEUMOC VAC/ADMIN/RCVD: CPT | Performed by: FAMILY MEDICINE

## 2022-03-14 PROCEDURE — G8399 PT W/DXA RESULTS DOCUMENT: HCPCS | Performed by: FAMILY MEDICINE

## 2022-03-14 PROCEDURE — G8417 CALC BMI ABV UP PARAM F/U: HCPCS | Performed by: FAMILY MEDICINE

## 2022-03-14 PROCEDURE — 1036F TOBACCO NON-USER: CPT | Performed by: FAMILY MEDICINE

## 2022-03-14 PROCEDURE — 1090F PRES/ABSN URINE INCON ASSESS: CPT | Performed by: FAMILY MEDICINE

## 2022-03-14 PROCEDURE — 3017F COLORECTAL CA SCREEN DOC REV: CPT | Performed by: FAMILY MEDICINE

## 2022-03-14 PROCEDURE — G8484 FLU IMMUNIZE NO ADMIN: HCPCS | Performed by: FAMILY MEDICINE

## 2022-03-14 RX ORDER — SENNA PLUS 8.6 MG/1
2 TABLET ORAL EVERY OTHER DAY
Qty: 30 TABLET | Refills: 11 | COMMUNITY
Start: 2022-03-14 | End: 2023-03-14

## 2022-03-14 ASSESSMENT — ENCOUNTER SYMPTOMS
RESPIRATORY NEGATIVE: 1
CONSTIPATION: 1

## 2022-03-14 NOTE — PROGRESS NOTES
[facility] PRIMARY CARE  [address]  [facility] [address]  Dept: [address] Ascencion Potts is a 70 y.o. female Established patient, who presents today for her medical conditions/complaintsas noted below. Chief Complaint   Patient presents with    6 Month Follow-Up     Pt here today for 6 month f/u       HPI:     HPI    No med SE  Reviewed prior notes None  Reviewed previous Labs and Imaging    Has lost 4 #    Had bladder stimulator put in and had 4 uti's since then. Irritation in the vagina and had biopsy done  Had been treated with steroid cream. She looked it up and found spironolactone could be adding to the vaginal irritation. She is seeing specialist and they considered estrogen cream but is not on it.     macrobid gives her headaches. LDL Cholesterol (mg/dL)   Date Value   12/22/2021 92   10/19/2020 67   12/09/2019 106     LDL Calculated (mg/dL)   Date Value   08/01/2018 75   06/26/2017 70       (goal LDL is <100)   AST (U/L)   Date Value   09/29/2021 18     ALT (U/L)   Date Value   09/29/2021 16     BUN (mg/dL)   Date Value   09/29/2021 18     BP Readings from Last 3 Encounters:   03/14/22 (!) 142/88   12/13/21 (!) 95/53   12/13/21 (!) 147/94          (goal 120/80)    Past Medical History:   Diagnosis Date    Arthritis     Asthma     \"winter\" asthma    Bronchitis     Constipation     GERD (gastroesophageal reflux disease)     Hyperlipidemia     Hypertension     Hypokalemia     Overactive bladder     Wears glasses       Past Surgical History:   Procedure Laterality Date    BACK SURGERY  1983 and 1992    neck and lower back    BLADDER SURGERY  2006, 2008    2 surgeries: sling surgery first then another sling and tack bladder up surgery    CATARACT REMOVAL WITH IMPLANT Bilateral     COLONOSCOPY  2015    had polyp, [facility] at [facility].  Dr Robbin Mena COLONOSCOPY N/A 8/21/2018    COLONOSCOPY POLYPECTOMY /COLD BX  performed by Kym Alvarez MD at [facility] COLONOSCOPY N/A 12/13/2021    COLONOSCOPY POLYPECTOMY SNARE/COLD BIOPSY performed by Bebe Torres MD at [facility]  02/2017    upper rt back.  KNEE ARTHROSCOPY Right     [place] KNEE SCOPE,DIAGNOSTIC Right 10/4/2018    KNEE ARTHROSCOPY FOR PARTIAL, MEDIAL MENISCECTOMY. performed by Sherian Leyden, MD at [facility] N/A 10/11/2021    AXONICS STIMULATOR INSERTION STAGE 1 (N/A )    STIMULATOR SURGERY N/A 10/11/2021    AXONICS STIMULATOR INSERTION STAGE 1 performed by Yanique Dalal DO at [place] [address]  10/25/2021     AXONICS STIMULATOR INSERTION STAGE 2     STIMULATOR SURGERY N/A 10/25/2021    AXONICS STIMULATOR INSERTION STAGE 2 performed [facility] Yanique Dalal DO [facility] Bilateral 2013, 2015    Dr Lavonda Hammans History   Problem Relation Age of Onset    Hypertension Mother     Fainting Mother     Hypertension Sister     Heart Disease Maternal Grandmother        Social History     Tobacco Use    Smoking status: Never Smoker    Smokeless tobacco: Never Used   Substance Use Topics    Alcohol use:  Yes     Alcohol/week: 0.0 standard drinks     Types: 1 Glasses of wine, 1 Cans of beer, 1 Shots of liquor per week     Comment: 3 times a week      Current Outpatient Medications   Medication Sig Dispense Refill    spironolactone (ALDACTONE) 50 MG tablet TAKE ONE TABLET BY MOUTH DAILY WITH LUNCH 90 tablet 2    cloNIDine (CATAPRES) 0.3 MG tablet TAKE ONE TABLET BY MOUTH ONCE NIGHTLY 90 tablet 3    simvastatin (ZOCOR) 20 MG tablet TAKE ONE TABLET BY MOUTH DAILY 90 tablet 3    Coenzyme Q10 (CO Q 10) 100 MG CAPS Take by mouth      Cholecalciferol (VITAMIN D3) 50 MCG (2000 UT) CAPS Take by mouth 2 tablets daily      Probiotic Product (PROBIOTIC-10 PO) Take by mouth      Omega-3 Fatty Acids (FISH OIL) 1000 MG CAPS Take 3,000 mg by mouth daily       aspirin 81 MG EC tablet Take 81 mg by mouth daily      Calcium Citrate-Vitamin D (CALCIUM CITRATE + D PO) Take 2 tablets by mouth daily       Calcium Polycarbophil (FIBER-CAPS PO) Take 5 capsules by mouth daily      esomeprazole (NEXIUM) 20 MG delayed release capsule Take 2 capsules by mouth daily 60 capsule 3    Multiple Vitamin TABS Take 1 tablet by mouth daily      Ascorbic Acid (VITAMIN C) 500 MG tablet Take 500 mg by mouth daily Indications: take as directed      Vitamin E 400 UNITS TABS Take 400 Units by mouth Twice a Week Indications: take as directed       triamcinolone (KENALOG) 0.1 % ointment        No current facility-administered medications for this visit. Allergies   Allergen Reactions    Ciprofloxacin Other (See Comments)     Patient relates only IV    Codeine     Lisinopril     Morphine     Norvasc [Amlodipine Besylate]     Penicillins     Sanctura [Trospium] Nausea Only     Bloated, nauseated    Xarelto [Rivaroxaban]     Macrobid [Nitrofurantoin] Other (See Comments)     headaches       Health Maintenance   Topic Date Due    Shingles Vaccine (2 of 3) 02/28/2012    Depression Screen  09/13/2022    Annual Wellness Visit (AWV)  09/14/2022    Potassium monitoring  09/29/2022    Creatinine monitoring  09/29/2022    Lipid screen  12/22/2022    Breast cancer screen  12/07/2023    DTaP/Tdap/Td vaccine (2 - Td or Tdap) 05/02/2026    Colorectal Cancer Screen  12/13/2031    DEXA (modify frequency per FRAX score)  Completed    Flu vaccine  Completed    Pneumococcal 65+ years Vaccine  Completed    COVID-19 Vaccine  Completed    Hepatitis C screen  Completed    Hepatitis A vaccine  Aged Out    Hepatitis B vaccine  Aged Out    Hib vaccine  Aged Out    Meningococcal (ACWY) vaccine  Aged Out       Subjective:      Review of Systems   Respiratory: Negative. Cardiovascular: Negative. Gastrointestinal: Positive for constipation. Musculoskeletal: Positive for arthralgias.         Left knee pain and left leg pain  Saw ortho doctor       Objective:     BP (!) 142/88   Pulse 82   Ht 5' 4\" (1.626 m)   Wt 209 lb (94.8 kg)   SpO2 97%   BMI 35.87 kg/m²   Physical Exam  Vitals and nursing note reviewed. Constitutional:       General: She is not in acute distress. Appearance: She is well-developed. She is not ill-appearing. HENT:      Head: Normocephalic and atraumatic. Right Ear: External ear normal.      Left Ear: External ear normal.   Eyes:      General: No scleral icterus. Right eye: No discharge. Left eye: No discharge. Conjunctiva/sclera: Conjunctivae normal.   Neck:      Thyroid: No thyromegaly. Trachea: No tracheal deviation. Cardiovascular:      Rate and Rhythm: Normal rate and regular rhythm. Heart sounds: Normal heart sounds. Pulmonary:      Effort: Pulmonary effort is normal. No respiratory distress. Breath sounds: Normal breath sounds. No wheezing. Musculoskeletal:      Right lower leg: No edema. Left lower leg: No edema. Lymphadenopathy:      Cervical: No cervical adenopathy. Skin:     General: Skin is warm. Findings: No rash. Neurological:      Mental Status: She is alert and oriented to person, place, and time. Psychiatric:         Mood and Affect: Mood normal.         Behavior: Behavior normal.         Thought Content: Thought content normal.         Assessment:       Diagnosis Orders   1. Essential hypertension     2. Mixed hyperlipidemia     3. Hyperlipidemia, unspecified hyperlipidemia type          Plan:      Return in about 6 months (around 9/14/2022). Watch diet  No orders of the defined types were placed in this encounter. No orders of the defined types were placed in this encounter. Patient given educationalmaterials - see patient instructions. Discussed use, benefit, and side effectsof prescribed medications. All patient questions answered. Pt voiced understanding. Reviewed health maintenance. Instructed to continue current medications, diet andexercise. Patient agreed with treatment plan. Follow up as directed.      Electronicallysigned by Hieu Maharaj MD on 3/14/2022 at 9:55 AM

## 2022-03-14 NOTE — PATIENT INSTRUCTIONS
Patient Education        A Healthy Lifestyle: Care Instructions  Your Care Instructions     A healthy lifestyle can help you feel good, stay at a healthy weight, and have plenty of energy for both work and play. A healthy lifestyle is something you can share with your whole family. A healthy lifestyle also can lower your risk for serious health problems, such as high blood pressure, heart disease, and diabetes. You can follow a few steps listed below to improve your health and the health of your family. Follow-up care is a key part of your treatment and safety. Be sure to make and go to all appointments, and call your doctor if you are having problems. It's also a good idea to know your test results and keep a list of the medicines you take. How can you care for yourself at home? · Do not eat too much sugar, fat, or fast foods. You can still have dessert and treats now and then. The goal is moderation. · Start small to improve your eating habits. Pay attention to portion sizes, drink less juice and soda pop, and eat more fruits and vegetables. ? Eat a healthy amount of food. A 3-ounce serving of meat, for example, is about the size of a deck of cards. Fill the rest of your plate with vegetables and whole grains. ? Limit the amount of soda and sports drinks you have every day. Drink more water when you are thirsty. ? Eat plenty of fruits and vegetables every day. Have an apple or some carrot sticks as an afternoon snack instead of a candy bar. Try to have fruits and/or vegetables at every meal.  · Make exercise part of your daily routine. You may want to start with simple activities, such as walking, bicycling, or slow swimming. Try to be active 30 to 60 minutes every day. You do not need to do all 30 to 60 minutes all at once. For example, you can exercise 3 times a day for 10 or 20 minutes.  Moderate exercise is safe for most people, but it is always a good idea to talk to your doctor before starting an exercise program.  · Keep moving. Bart Dolan the lawn, work in the garden, or [facility]. Take the stairs instead of the elevator at work. · If you smoke, quit. People who smoke have an increased risk for heart attack, stroke, cancer, and other lung illnesses. Quitting is hard, but there are ways to boost your chance of quitting tobacco for good. ? Use nicotine gum, patches, or lozenges. ? Ask your doctor about stop-smoking programs and medicines. ? Keep trying. In addition to reducing your risk of diseases in the future, you will notice some benefits soon after you stop using tobacco. If you have shortness of breath or asthma symptoms, they will likely get better within a few weeks after you quit. · Limit how much alcohol you drink. Moderate amounts of alcohol (up to 2 drinks a day for men, 1 drink a day for women) are okay. But drinking too much can lead to liver problems, high blood pressure, and other health problems. Family health  If you have a family, there are many things you can do together to improve your health. · Eat meals together as a family as often as possible. · Eat healthy foods. This includes fruits, vegetables, lean meats and dairy, and whole grains. · Include your family in your fitness plan. Most people think of activities such as jogging or tennis as the way to fitness, but there are many ways you and your family can be more active. Anything that makes you breathe hard and gets your heart pumping is exercise. Here are some tips:  ? Walk to do errands or to take your child to school or the bus.  ? Go for a family bike ride after dinner instead of watching TV. Where can you learn more? Go to https://Global Renew[employer]estoo.[employer] ([employer]). org and sign in to your [employer] account. Enter I187 in the ConferenceEdge box to learn more about \"A Healthy Lifestyle: Care Instructions. \"     If you do not have an account, please click on the \"Sign Up Now\" link.   Current as of: June 16, 2021               Content Version: 13.1  © 8455-9692 Healthwise, Incorporated. Care instructions adapted under license by [facility] ([facility]). If you have questions about a medical condition or this instruction, always ask your healthcare professional. [address] any warranty or liability for your use of this information.

## 2022-09-16 ENCOUNTER — OFFICE VISIT (OUTPATIENT)
Dept: PRIMARY CARE CLINIC | Age: 72
End: 2022-09-16
Payer: MEDICARE

## 2022-09-16 VITALS
DIASTOLIC BLOOD PRESSURE: 86 MMHG | SYSTOLIC BLOOD PRESSURE: 136 MMHG | HEART RATE: 66 BPM | HEIGHT: 64 IN | OXYGEN SATURATION: 97 % | BODY MASS INDEX: 36.84 KG/M2 | WEIGHT: 215.8 LBS

## 2022-09-16 DIAGNOSIS — Z23 NEED FOR VACCINATION: ICD-10-CM

## 2022-09-16 DIAGNOSIS — S46.219A BICEPS TENDON TEAR: ICD-10-CM

## 2022-09-16 DIAGNOSIS — E78.2 MIXED HYPERLIPIDEMIA: ICD-10-CM

## 2022-09-16 DIAGNOSIS — I10 ESSENTIAL HYPERTENSION: Primary | ICD-10-CM

## 2022-09-16 DIAGNOSIS — N18.30 STAGE 3 CHRONIC KIDNEY DISEASE, UNSPECIFIED WHETHER STAGE 3A OR 3B CKD (HCC): ICD-10-CM

## 2022-09-16 PROCEDURE — G8417 CALC BMI ABV UP PARAM F/U: HCPCS | Performed by: FAMILY MEDICINE

## 2022-09-16 PROCEDURE — 1036F TOBACCO NON-USER: CPT | Performed by: FAMILY MEDICINE

## 2022-09-16 PROCEDURE — G0008 ADMIN INFLUENZA VIRUS VAC: HCPCS | Performed by: FAMILY MEDICINE

## 2022-09-16 PROCEDURE — G8399 PT W/DXA RESULTS DOCUMENT: HCPCS | Performed by: FAMILY MEDICINE

## 2022-09-16 PROCEDURE — 3017F COLORECTAL CA SCREEN DOC REV: CPT | Performed by: FAMILY MEDICINE

## 2022-09-16 PROCEDURE — 90694 VACC AIIV4 NO PRSRV 0.5ML IM: CPT | Performed by: FAMILY MEDICINE

## 2022-09-16 PROCEDURE — G8427 DOCREV CUR MEDS BY ELIG CLIN: HCPCS | Performed by: FAMILY MEDICINE

## 2022-09-16 PROCEDURE — 1123F ACP DISCUSS/DSCN MKR DOCD: CPT | Performed by: FAMILY MEDICINE

## 2022-09-16 PROCEDURE — 99214 OFFICE O/P EST MOD 30 MIN: CPT | Performed by: FAMILY MEDICINE

## 2022-09-16 PROCEDURE — 1090F PRES/ABSN URINE INCON ASSESS: CPT | Performed by: FAMILY MEDICINE

## 2022-09-16 RX ORDER — ESTRADIOL 0.1 MG/G
CREAM VAGINAL
COMMUNITY
Start: 2022-03-23

## 2022-09-16 RX ORDER — CRANBERRY FRUIT EXTRACT 200 MG
CAPSULE ORAL
COMMUNITY

## 2022-09-16 SDOH — ECONOMIC STABILITY: FOOD INSECURITY: WITHIN THE PAST 12 MONTHS, YOU WORRIED THAT YOUR FOOD WOULD RUN OUT BEFORE YOU GOT MONEY TO BUY MORE.: NEVER TRUE

## 2022-09-16 SDOH — ECONOMIC STABILITY: FOOD INSECURITY: WITHIN THE PAST 12 MONTHS, THE FOOD YOU BOUGHT JUST DIDN'T LAST AND YOU DIDN'T HAVE MONEY TO GET MORE.: NEVER TRUE

## 2022-09-16 ASSESSMENT — PATIENT HEALTH QUESTIONNAIRE - PHQ9
SUM OF ALL RESPONSES TO PHQ QUESTIONS 1-9: 0
SUM OF ALL RESPONSES TO PHQ QUESTIONS 1-9: 0
2. FEELING DOWN, DEPRESSED OR HOPELESS: 0
SUM OF ALL RESPONSES TO PHQ9 QUESTIONS 1 & 2: 0
SUM OF ALL RESPONSES TO PHQ QUESTIONS 1-9: 0
1. LITTLE INTEREST OR PLEASURE IN DOING THINGS: 0
SUM OF ALL RESPONSES TO PHQ QUESTIONS 1-9: 0

## 2022-09-16 ASSESSMENT — SOCIAL DETERMINANTS OF HEALTH (SDOH): HOW HARD IS IT FOR YOU TO PAY FOR THE VERY BASICS LIKE FOOD, HOUSING, MEDICAL CARE, AND HEATING?: NOT HARD AT ALL

## 2022-09-16 ASSESSMENT — ENCOUNTER SYMPTOMS
BACK PAIN: 1
SHORTNESS OF BREATH: 1

## 2022-09-16 NOTE — PROGRESS NOTES
[facility] PRIMARY CARE  [address] [facility] [address]  Dept: [address] Laurie Castro is a 67 y.o. female Established patient, who presents today for her medical conditions/complaintsas noted below. Chief Complaint   Patient presents with    Hypertension       HPI:     HPI  No med SE    Sometimes has to do a catch up breath with exertion    Reviewed prior notes None  Reviewed previous Labs and Imaging    LDL Cholesterol (mg/dL)   Date Value   12/22/2021 92   10/19/2020 67   12/09/2019 106     LDL Calculated (mg/dL)   Date Value   08/01/2018 75   06/26/2017 70       (goal LDL is <100)   AST (U/L)   Date Value   09/29/2021 18     ALT (U/L)   Date Value   09/29/2021 16     BUN (mg/dL)   Date Value   09/29/2021 18     BP Readings from Last 3 Encounters:   09/16/22 136/86   03/14/22 (!) 142/88   12/13/21 (!) 95/53          (goal 120/80)    Past Medical History:   Diagnosis Date    Arthritis     Asthma     \"winter\" asthma    Bronchitis     Constipation     GERD (gastroesophageal reflux disease)     Hyperlipidemia     Hypertension     Hypokalemia     Overactive bladder     Wears glasses       Past Surgical History:   Procedure Laterality Date    [address] and 1992    neck and lower back    BLADDER SURGERY  2006, 2008    2 surgeries: sling surgery first then another sling and tack bladder up surgery    CATARACT REMOVAL WITH IMPLANT Bilateral     COLONOSCOPY  2015    had polyp, done at [facility]. Dr J Carlos Thorne    COLONOSCOPY N/A 8/21/2018    COLONOSCOPY POLYPECTOMY /COLD BX  performed by Tony Vera MD at [address],[facility] [address] N/A 12/13/2021    COLONOSCOPY POLYPECTOMY SNARE/COLD BIOPSY performed by Tony Vera MD at [address] [facility] [address] [facility]  02/2017    upper rt back. KNEE ARTHROSCOPY Right     CO KNEE SCOPE,DIAGNOSTIC Right 10/4/2018    KNEE ARTHROSCOPY FOR PARTIAL, MEDIAL MENISCECTOMY.  performed by Corbin Grubbs MD at [address] [facility] SURGERY N/A 10/11/2021    AXONICS STIMULATOR INSERTION STAGE 1 (N/A )    STIMULATOR SURGERY N/A 10/11/2021    AXONICS STIMULATOR INSERTION STAGE 1 performed by Alesia Quiros DO at [address]  10/25/2021     AXONICS STIMULATOR INSERTION STAGE 2     STIMULATOR SURGERY N/A 10/25/2021    AXONICS STIMULATOR INSERTION STAGE 2 performed by Alesia Quiros DO at [address] Bilateral 2013, 2015    Dr Jody Crocker History   Problem Relation Age of Onset    Hypertension Mother     Fainting Mother     Hypertension Sister     Heart Disease Maternal Grandmother        Social History     Tobacco Use    Smoking status: Never    Smokeless tobacco: Never   Substance Use Topics    Alcohol use:  Yes     Alcohol/week: 0.0 standard drinks     Types: 1 Glasses of wine, 1 Cans of beer, 1 Shots of liquor per week     Comment: 3 times a week      Current Outpatient Medications   Medication Sig Dispense Refill    estradiol (ESTRACE) 0.1 MG/GM vaginal cream 1 gram at HS      Cranberry 200 MG CAPS Take by mouth      Handicap Placard MISC by Does not apply route For orthopedic issues, expires 9/16/2027 1 each 0    triamcinolone (KENALOG) 0.1 % ointment       senna (SENOKOT) 8.6 MG tablet Take 2 tablets by mouth every other day 30 tablet 11    spironolactone (ALDACTONE) 50 MG tablet TAKE ONE TABLET BY MOUTH DAILY WITH LUNCH 90 tablet 2    cloNIDine (CATAPRES) 0.3 MG tablet TAKE ONE TABLET BY MOUTH ONCE NIGHTLY 90 tablet 3    simvastatin (ZOCOR) 20 MG tablet TAKE ONE TABLET BY MOUTH DAILY 90 tablet 3    Coenzyme Q10 (CO Q 10) 100 MG CAPS Take by mouth      Cholecalciferol (VITAMIN D3) 50 MCG (2000 UT) CAPS Take by mouth 2 tablets daily      Probiotic Product (PROBIOTIC-10 PO) Take by mouth      Omega-3 Fatty Acids (FISH OIL) 1000 MG CAPS Take 3,000 mg by mouth daily       aspirin 81 MG EC tablet Take 81 mg by mouth daily      Calcium Citrate-Vitamin D (CALCIUM CITRATE + D PO) Take 2 tablets by mouth daily       Calcium Polycarbophil (FIBER-CAPS PO) Take 5 capsules by mouth daily      esomeprazole (NEXIUM) 20 MG delayed release capsule Take 2 capsules by mouth daily 60 capsule 3    Multiple Vitamin TABS Take 1 tablet by mouth daily      Ascorbic Acid (VITAMIN C) 500 MG tablet Take 500 mg by mouth daily Indications: take as directed      Vitamin E 400 UNITS TABS Take 400 Units by mouth Twice a Week Indications: take as directed        No current facility-administered medications for this visit. Allergies   Allergen Reactions    Ciprofloxacin Other (See Comments)     Patient relates only IV    Codeine     Lisinopril     Morphine     Norvasc [Amlodipine Besylate]     Penicillins     Sanctura [Trospium] Nausea Only     Bloated, nauseated    Xarelto [Rivaroxaban]     Macrobid [Nitrofurantoin] Other (See Comments)     headaches       Health Maintenance   Topic Date Due    Shingles vaccine (2 of 3) 02/28/2012    Depression Screen  09/13/2022    Annual Wellness Visit (AWV)  09/14/2022    Lipids  12/22/2022    Breast cancer screen  12/07/2023    DTaP/Tdap/Td vaccine (2 - Td or Tdap) 05/02/2026    Colorectal Cancer Screen  12/13/2031    DEXA (modify frequency per FRAX score)  Completed    Flu vaccine  Completed    Pneumococcal 65+ years Vaccine  Completed    COVID-19 Vaccine  Completed    Hepatitis C screen  Completed    Hepatitis A vaccine  Aged Out    Hepatitis B vaccine  Aged Out    Hib vaccine  Aged Out    Meningococcal (ACWY) vaccine  Aged Out       Subjective:      Review of Systems   Respiratory:  Positive for shortness of breath. Cardiovascular:  Negative for chest pain and leg swelling. Mild flutter and elevated heart rate when trying to get up from bed off and on. Musculoskeletal:  Positive for arthralgias and back pain.         Has trouble getting out of bed due to joint pain   Was reaching for a water bottle in July and had a snap in the left upper arm and severe bruising. Objective:     /86   Pulse 66   Ht 5' 4\" (1.626 m)   Wt 215 lb 12.8 oz (97.9 kg)   SpO2 97%   BMI 37.04 kg/m²   Physical Exam  Vitals and nursing note reviewed. Constitutional:       General: She is not in acute distress. Appearance: Normal appearance. She is well-developed. She is not ill-appearing. HENT:      Head: Normocephalic and atraumatic. Right Ear: External ear normal.      Left Ear: External ear normal.   Eyes:      General: No scleral icterus. Right eye: No discharge. Left eye: No discharge. Conjunctiva/sclera: Conjunctivae normal.   Neck:      Thyroid: No thyromegaly. Trachea: No tracheal deviation. Cardiovascular:      Rate and Rhythm: Normal rate and regular rhythm. Heart sounds: Normal heart sounds. Pulmonary:      Effort: Pulmonary effort is normal. No respiratory distress. Breath sounds: Normal breath sounds. No wheezing. Musculoskeletal:      Right lower leg: No edema. Left lower leg: No edema. Comments: Biceps tendon balling up in left ant arm: appears to be a tear in tendon   Lymphadenopathy:      Cervical: No cervical adenopathy. Skin:     General: Skin is warm. Findings: No rash. Neurological:      Mental Status: She is alert and oriented to person, place, and time. Psychiatric:         Mood and Affect: Mood normal.         Behavior: Behavior normal.         Thought Content: Thought content normal.       Assessment:       Diagnosis Orders   1. Essential hypertension  Comprehensive Metabolic Panel, Fasting    Lipid Panel      2. Biceps tendon tear  [address], MD, Orthopedic Surgery, [place]      3. Mixed hyperlipidemia  Comprehensive Metabolic Panel, Fasting    Lipid Panel      4. Need for vaccination  Influenza, FLUAD, (age 72 y+), IM, Preservative Free, 0.5 mL      5.  Stage 3 chronic kidney disease, unspecified whether stage 3a or 3b CKD ([facility] [address]               Plan:

## 2022-10-02 SDOH — HEALTH STABILITY: PHYSICAL HEALTH: ON AVERAGE, HOW MANY DAYS PER WEEK DO YOU ENGAGE IN MODERATE TO STRENUOUS EXERCISE (LIKE A BRISK WALK)?: 3 DAYS

## 2022-10-02 SDOH — HEALTH STABILITY: PHYSICAL HEALTH: ON AVERAGE, HOW MANY MINUTES DO YOU ENGAGE IN EXERCISE AT THIS LEVEL?: 20 MIN

## 2022-10-03 ENCOUNTER — OFFICE VISIT (OUTPATIENT)
Dept: ORTHOPEDIC SURGERY | Age: 72
End: 2022-10-03
Payer: MEDICARE

## 2022-10-03 VITALS — RESPIRATION RATE: 16 BRPM | HEIGHT: 64 IN | WEIGHT: 215 LBS | BODY MASS INDEX: 36.7 KG/M2

## 2022-10-03 DIAGNOSIS — M19.011 OSTEOARTHRITIS OF RIGHT GLENOHUMERAL JOINT: Primary | ICD-10-CM

## 2022-10-03 DIAGNOSIS — M25.512 LEFT SHOULDER PAIN, UNSPECIFIED CHRONICITY: ICD-10-CM

## 2022-10-03 DIAGNOSIS — M25.511 RIGHT SHOULDER PAIN, UNSPECIFIED CHRONICITY: ICD-10-CM

## 2022-10-03 PROCEDURE — 1036F TOBACCO NON-USER: CPT | Performed by: ORTHOPAEDIC SURGERY

## 2022-10-03 PROCEDURE — G8484 FLU IMMUNIZE NO ADMIN: HCPCS | Performed by: ORTHOPAEDIC SURGERY

## 2022-10-03 PROCEDURE — 3017F COLORECTAL CA SCREEN DOC REV: CPT | Performed by: ORTHOPAEDIC SURGERY

## 2022-10-03 PROCEDURE — 1090F PRES/ABSN URINE INCON ASSESS: CPT | Performed by: ORTHOPAEDIC SURGERY

## 2022-10-03 PROCEDURE — G8399 PT W/DXA RESULTS DOCUMENT: HCPCS | Performed by: ORTHOPAEDIC SURGERY

## 2022-10-03 PROCEDURE — 99214 OFFICE O/P EST MOD 30 MIN: CPT | Performed by: ORTHOPAEDIC SURGERY

## 2022-10-03 PROCEDURE — 1123F ACP DISCUSS/DSCN MKR DOCD: CPT | Performed by: ORTHOPAEDIC SURGERY

## 2022-10-03 PROCEDURE — 20610 DRAIN/INJ JOINT/BURSA W/O US: CPT | Performed by: ORTHOPAEDIC SURGERY

## 2022-10-03 PROCEDURE — G8417 CALC BMI ABV UP PARAM F/U: HCPCS | Performed by: ORTHOPAEDIC SURGERY

## 2022-10-03 PROCEDURE — G8427 DOCREV CUR MEDS BY ELIG CLIN: HCPCS | Performed by: ORTHOPAEDIC SURGERY

## 2022-10-03 RX ORDER — TRIAMCINOLONE ACETONIDE 40 MG/ML
40 INJECTION, SUSPENSION INTRA-ARTICULAR; INTRAMUSCULAR ONCE
Status: COMPLETED | OUTPATIENT
Start: 2022-10-03 | End: 2022-10-03

## 2022-10-03 RX ORDER — LIDOCAINE HYDROCHLORIDE 10 MG/ML
5 INJECTION, SOLUTION INFILTRATION; PERINEURAL ONCE
Status: COMPLETED | OUTPATIENT
Start: 2022-10-03 | End: 2022-10-03

## 2022-10-03 RX ADMIN — TRIAMCINOLONE ACETONIDE 40 MG: 40 INJECTION, SUSPENSION INTRA-ARTICULAR; INTRAMUSCULAR at 09:02

## 2022-10-03 RX ADMIN — LIDOCAINE HYDROCHLORIDE 5 ML: 10 INJECTION, SOLUTION INFILTRATION; PERINEURAL at 09:01

## 2022-10-03 NOTE — PROGRESS NOTES
Orthopedic Shoulder Encounter Note     Chief complaint: right shoulder pain    HPI: Robbin Balderrama is a 67 y.o.  right-hand dominant female who presents for evaluation of both her shoulders. She indicates that starting with the left shoulder she felt a painful snap in her shoulder as she reached across her body back in July. She noticed a lump in the biceps muscle and has been dealing with some pain in this area since then. She feels some discomfort laying on the left side. She denies having any weakness. Her pain does not radiate and it is not associate with any numbness or tingling. With the right shoulder she has been having pain for at least 2 years now that is progressively getting worse. She has limitation in her ability to raise this arm as a result of pain and stiffness and she is unable to sleep on the right side. Previous treatment:    NSAIDs: Ibuprofen    Physical Therapy: No    Injections: None    Surgeries: None    Review of Systems:   Constitutional: Negative for fever, chills, sweats. Neurological: Negative for headache, numbness, or weakness. Musculoskeletal: As noted in HPI     Past Medical History  Melina Meraz  has a past medical history of Arthritis, Asthma, Bronchitis, Constipation, GERD (gastroesophageal reflux disease), Hyperlipidemia, Hypertension, Hypokalemia, Overactive bladder, and Wears glasses. Past Surgical History  Melina Meraz  has a past surgical history that includes back surgery (1983 and 1992); Bladder surgery (2006, 2008); Tubal ligation; Total hip arthroplasty (Bilateral, 2013, 2015); cyst removal (02/2017); Colonoscopy (2015); Colonoscopy (N/A, 8/21/2018); Cataract removal with implant (Bilateral); pr knee scope,diagnostic (Right, 10/4/2018); Stimulator Surgery (N/A, 10/11/2021); Stimulator Surgery (N/A, 10/11/2021); Stimulator Surgery (10/25/2021); Stimulator Surgery (N/A, 10/25/2021); Knee arthroscopy (Right); and Colonoscopy (N/A, 12/13/2021).     Current Medications  Current Outpatient Medications   Medication Sig Dispense Refill    estradiol (ESTRACE) 0.1 MG/GM vaginal cream 1 gram at HS      Cranberry 200 MG CAPS Take by mouth      Handicap Placard MISC by Does not apply route For orthopedic issues, expires 9/16/2027 1 each 0    triamcinolone (KENALOG) 0.1 % ointment       senna (SENOKOT) 8.6 MG tablet Take 2 tablets by mouth every other day 30 tablet 11    spironolactone (ALDACTONE) 50 MG tablet TAKE ONE TABLET BY MOUTH DAILY WITH LUNCH 90 tablet 2    cloNIDine (CATAPRES) 0.3 MG tablet TAKE ONE TABLET BY MOUTH ONCE NIGHTLY 90 tablet 3    simvastatin (ZOCOR) 20 MG tablet TAKE ONE TABLET BY MOUTH DAILY 90 tablet 3    Coenzyme Q10 (CO Q 10) 100 MG CAPS Take by mouth      Cholecalciferol (VITAMIN D3) 50 MCG (2000 UT) CAPS Take by mouth 2 tablets daily      Probiotic Product (PROBIOTIC-10 PO) Take by mouth      Omega-3 Fatty Acids (FISH OIL) 1000 MG CAPS Take 3,000 mg by mouth daily       aspirin 81 MG EC tablet Take 81 mg by mouth daily      Calcium Citrate-Vitamin D (CALCIUM CITRATE + D PO) Take 2 tablets by mouth daily       Calcium Polycarbophil (FIBER-CAPS PO) Take 5 capsules by mouth daily      esomeprazole (NEXIUM) 20 MG delayed release capsule Take 2 capsules by mouth daily 60 capsule 3    Multiple Vitamin TABS Take 1 tablet by mouth daily      Ascorbic Acid (VITAMIN C) 500 MG tablet Take 500 mg by mouth daily Indications: take as directed      Vitamin E 400 UNITS TABS Take 400 Units by mouth Twice a Week Indications: take as directed        No current facility-administered medications for this visit. Allergies  Allergies have been reviewed. Shay Hernández is allergic to ciprofloxacin, codeine, lisinopril, morphine, norvasc [amlodipine besylate], penicillins, sanctura [trospium], xarelto [rivaroxaban], and macrobid [nitrofurantoin]. Social History  Shay Hernández  reports that she has never smoked. She has never used smokeless tobacco. She reports current alcohol use. She reports that she does not use drugs. Family History  Muna's family history includes Fainting in her mother; Heart Disease in her maternal grandmother; Hypertension in her mother and sister. Physical Exam:     There were no vitals taken for this visit. Constitutional: Patient is oriented to person, place, and time. Patient appears well-developed and well nourished. Mental Status/psychiatric: Behavior is normal. Thought content normal.  HENT: Negative otherwise noted  Head: Normocephalic and Atraumatic  Nose: Normal  Respiratory/Cardio: Effort normal. No respiratory distress. Shoulder:    Skin: Skin is warm and dry; no swelling or obvious muscular atrophy.    Vasculature: 2+ radial pulses bilaterally  Neuro: Sensation grossly intact to light touch diffusely  Tenderness: Tender to palpation of anterior aspect of the left shoulder    ROM: (Degrees)    Right   A P   Left   A P    Elevation  110 105   Elevation  135 140  Abduction  100 105   Abduction  135  145  ER   20 30   ER   45 70  IR   Waist    IR   T12   90 abd/ER      90 abd/ER     90 abd/IR      90 abd/IR     Crepitation  No    Crepitation No  Dyskenesia  No    Dyskenesia No      Muscle strength:    Right       Left    Deltoid   5    Deltoid   5  Supraspinatus  5    Supraspinatus  5  ER   5    ER   5  IR   5    IR   5    Special tests    Right   Rotator Cuff    Left    y   Painful arc    n   y   Pain with ER    n    y   Neer's     n    y   Hawkin's    n    n   Drop Arm    n  n   Lift off/Belly Press   n  n   ER Lag    n          Borg[employer]ner Joint  n   AC tenderness   n  n   Cross-chest adduction  n       Labrum/biceps    n   Fouke's    n   n   Biceps sheer    n      n   Speed's/Yergason's   y    n   Tenderness Biceps Groove  y    n   Yevgeniy's    y         Instability  n   Ant Apprehension   n    n   Post Apprehension   n    n   Ant Load shift    n    n   Post Load shift   n   n   Sulcus     n  n   Generalized Laxity   n  n   Relocation test   n  n   Crank test     n  n   Richard-superior escape  n       Imaging:  Xrays: 4 views of the left shoulder obtained on 10/3/2022 were independently reviewed  Indications: Left shoulder pain  Findings: Normal glenohumeral joint space. Small inferior humeral head osteophyte. Moderate osteophytic changes involving the distal clavicle and acromion at the joint associated with some narrowing. Type III acromion. No obvious fracture, dislocation or subluxation. Impression: Left shoulder radiograph with moderate degenerative changes as outlined above involving the acromioclavicular joint. Xrays: 4 views of the right shoulder obtained on 10/3/2022 were independently reviewed  Indications: Right shoulder pain  Findings: Near complete to complete glenohumeral joint space loss associated with a large inferior humeral head osteophyte and a central pattern of glenoid wear. Moderate osteophytic changes involving the distal clavicle and acromion at the acromioclavicular joint. Impression: Right shoulder radiographs with severe glenohumeral joint degenerative changes as outlined above. Impression/Plan:     Magdalene Santos is a 67 y.o. old female with left shoulder pain due to proximal biceps tendon rupture and right shoulder pain due to severe glenohumeral joint osteoarthritis. I had a discussion with the patient educating her about these conditions and discussed treatment options available to her. I recommended managing both conservatively. I had a discussion with her about use of physical therapy, anti-inflammatories, cortisone injections and activity modification. At this time she is electing to proceed with an injection to the right shoulder and states that she will continue to use over-the-counter anti-inflammatories for the left. She received the injection to the right shoulder as outlined below.   I will see her back in my clinic as needed but she may certainly return or call at anytime with persistent or worsening symptoms and with any questions or concerns. Procedure: right shoulder glenohumeral injection  Following an appropriate discussion with the patient regarding the risks and benefits of the procedure she consented to proceed. her right shoulder was prepped using chlorhexadine solution. Using aseptic technique and through a posterior approach, her right shoulder glenohumeral joint space was injected with a 6 cc mixture of 1cc 40mg/ml kenalog and 5 cc of 1% lidocaine without epinephrine. A band aid was applied to the injection site. she tolerated the injection with no immediate adverse reactions. This note is created with the assistance of a speech recognition program.  While intending to generate adocument that actually reflects the content of the visit, the document can still have some errors including those of syntax and sound a like substitutions which may escape proof reading. It such instances, actual meaningcan be extrapolated by contextual diversion.     NA = Not assessed  RTC = Rotator cuff  RCT = Rotator cuff tear  ER = External rotation  IR = Internal rotation  AC = Acromioclavicular  GH = Glenohumeral  n = No  y = Yes

## 2022-11-17 DIAGNOSIS — I10 ESSENTIAL HYPERTENSION: ICD-10-CM

## 2022-11-17 DIAGNOSIS — E78.2 MIXED HYPERLIPIDEMIA: ICD-10-CM

## 2022-11-17 LAB
ALBUMIN SERPL-MCNC: 4.4 G/DL (ref 3.5–5.2)
ALBUMIN/GLOBULIN RATIO: 1.8 (ref 1–2.5)
ALP BLD-CCNC: 81 U/L (ref 35–104)
ALT SERPL-CCNC: 17 U/L (ref 5–33)
ANION GAP SERPL CALCULATED.3IONS-SCNC: 8 MMOL/L (ref 9–17)
AST SERPL-CCNC: 21 U/L
BILIRUB SERPL-MCNC: 0.5 MG/DL (ref 0.3–1.2)
BUN BLDV-MCNC: 14 MG/DL (ref 8–23)
CALCIUM SERPL-MCNC: 9.3 MG/DL (ref 8.6–10.4)
CHLORIDE BLD-SCNC: 104 MMOL/L (ref 98–107)
CHOLESTEROL/HDL RATIO: 2.8
CHOLESTEROL: 176 MG/DL
CO2: 28 MMOL/L (ref 20–31)
CREAT SERPL-MCNC: 0.99 MG/DL (ref 0.5–0.9)
GFR SERPL CREATININE-BSD FRML MDRD: >60 ML/MIN/1.73M2
GLUCOSE FASTING: 94 MG/DL (ref 70–99)
HDLC SERPL-MCNC: 64 MG/DL
LDL CHOLESTEROL: 78 MG/DL (ref 0–130)
POTASSIUM SERPL-SCNC: 4.6 MMOL/L (ref 3.7–5.3)
SODIUM BLD-SCNC: 140 MMOL/L (ref 135–144)
TOTAL PROTEIN: 6.9 G/DL (ref 6.4–8.3)
TRIGL SERPL-MCNC: 171 MG/DL

## 2023-01-02 DIAGNOSIS — I10 ESSENTIAL HYPERTENSION: ICD-10-CM

## 2023-01-03 RX ORDER — SIMVASTATIN 20 MG
TABLET ORAL
Qty: 90 TABLET | Refills: 3 | Status: SHIPPED | OUTPATIENT
Start: 2023-01-03

## 2023-01-03 RX ORDER — CLONIDINE HYDROCHLORIDE 0.3 MG/1
TABLET ORAL
Qty: 90 TABLET | Refills: 3 | OUTPATIENT
Start: 2023-01-03

## 2023-01-03 RX ORDER — SIMVASTATIN 20 MG
TABLET ORAL
Qty: 90 TABLET | Refills: 3 | OUTPATIENT
Start: 2023-01-03

## 2023-01-03 RX ORDER — CLONIDINE HYDROCHLORIDE 0.3 MG/1
TABLET ORAL
Qty: 90 TABLET | Refills: 3 | Status: SHIPPED | OUTPATIENT
Start: 2023-01-03

## 2023-01-03 RX ORDER — SPIRONOLACTONE 50 MG/1
TABLET, FILM COATED ORAL
Qty: 90 TABLET | Refills: 2 | Status: SHIPPED | OUTPATIENT
Start: 2023-01-03

## 2023-03-06 ENCOUNTER — HOSPITAL ENCOUNTER (OUTPATIENT)
Dept: WOMENS IMAGING | Age: 73
Discharge: HOME | End: 2023-03-08
Payer: MEDICARE

## 2023-03-06 DIAGNOSIS — Z12.31 ENCOUNTER FOR SCREENING MAMMOGRAM FOR BREAST CANCER: ICD-10-CM

## 2023-03-06 PROCEDURE — 77063 BREAST TOMOSYNTHESIS BI: CPT

## 2023-03-09 DIAGNOSIS — R92.8 ABNORMAL MAMMOGRAM: Primary | ICD-10-CM

## 2023-03-09 NOTE — RESULT ENCOUNTER NOTE
Assymetry on right breast mammogram, needs more views and possible u/s  Did she get orders for that yet ?

## 2023-03-31 ENCOUNTER — HOSPITAL ENCOUNTER (OUTPATIENT)
Dept: WOMENS IMAGING | Age: 73
End: 2023-03-31
Payer: MEDICARE

## 2023-03-31 ENCOUNTER — OFFICE VISIT (OUTPATIENT)
Dept: PRIMARY CARE CLINIC | Age: 73
End: 2023-03-31

## 2023-03-31 VITALS
WEIGHT: 217.6 LBS | BODY MASS INDEX: 37.15 KG/M2 | HEART RATE: 70 BPM | DIASTOLIC BLOOD PRESSURE: 88 MMHG | OXYGEN SATURATION: 96 % | HEIGHT: 64 IN | TEMPERATURE: 98.1 F | SYSTOLIC BLOOD PRESSURE: 132 MMHG

## 2023-03-31 DIAGNOSIS — R92.8 ABNORMAL MAMMOGRAM: ICD-10-CM

## 2023-03-31 DIAGNOSIS — M53.3 SACROILIAC JOINT DISEASE: ICD-10-CM

## 2023-03-31 DIAGNOSIS — M25.551 RIGHT HIP PAIN: Primary | ICD-10-CM

## 2023-03-31 DIAGNOSIS — N18.30 STAGE 3 CHRONIC KIDNEY DISEASE, UNSPECIFIED WHETHER STAGE 3A OR 3B CKD (HCC): ICD-10-CM

## 2023-03-31 PROCEDURE — G0279 TOMOSYNTHESIS, MAMMO: HCPCS

## 2023-03-31 SDOH — ECONOMIC STABILITY: FOOD INSECURITY: WITHIN THE PAST 12 MONTHS, THE FOOD YOU BOUGHT JUST DIDN'T LAST AND YOU DIDN'T HAVE MONEY TO GET MORE.: NEVER TRUE

## 2023-03-31 SDOH — ECONOMIC STABILITY: HOUSING INSECURITY
IN THE LAST 12 MONTHS, WAS THERE A TIME WHEN YOU DID NOT HAVE A STEADY PLACE TO SLEEP OR SLEPT IN A SHELTER (INCLUDING NOW)?: NO

## 2023-03-31 SDOH — ECONOMIC STABILITY: INCOME INSECURITY: HOW HARD IS IT FOR YOU TO PAY FOR THE VERY BASICS LIKE FOOD, HOUSING, MEDICAL CARE, AND HEATING?: NOT HARD AT ALL

## 2023-03-31 SDOH — ECONOMIC STABILITY: FOOD INSECURITY: WITHIN THE PAST 12 MONTHS, YOU WORRIED THAT YOUR FOOD WOULD RUN OUT BEFORE YOU GOT MONEY TO BUY MORE.: NEVER TRUE

## 2023-03-31 SDOH — ECONOMIC STABILITY: TRANSPORTATION INSECURITY
IN THE PAST 12 MONTHS, HAS LACK OF TRANSPORTATION KEPT YOU FROM MEETINGS, WORK, OR FROM GETTING THINGS NEEDED FOR DAILY LIVING?: NO

## 2023-03-31 ASSESSMENT — ENCOUNTER SYMPTOMS: BACK PAIN: 1

## 2023-03-31 ASSESSMENT — PATIENT HEALTH QUESTIONNAIRE - PHQ9
SUM OF ALL RESPONSES TO PHQ QUESTIONS 1-9: 0
SUM OF ALL RESPONSES TO PHQ QUESTIONS 1-9: 0
SUM OF ALL RESPONSES TO PHQ9 QUESTIONS 1 & 2: 0
1. LITTLE INTEREST OR PLEASURE IN DOING THINGS: 0
2. FEELING DOWN, DEPRESSED OR HOPELESS: 0
SUM OF ALL RESPONSES TO PHQ QUESTIONS 1-9: 0
SUM OF ALL RESPONSES TO PHQ QUESTIONS 1-9: 0

## 2023-03-31 NOTE — PROGRESS NOTES
tablet Take 500 mg by mouth daily Indications: take as directed      Vitamin E 400 UNITS TABS Take 400 Units by mouth Twice a Week Indications: take as directed        No current facility-administered medications for this visit. Allergies   Allergen Reactions    Ciprofloxacin Other (See Comments)     Patient relates only IV    Codeine     Lisinopril     Morphine     Norvasc [Amlodipine Besylate]     Penicillins     Sanctura [Trospium] Nausea Only     Bloated, nauseated    Xarelto [Rivaroxaban]     Macrobid [Nitrofurantoin] Other (See Comments)     headaches       Subjective:     Review of Systems   Musculoskeletal:  Positive for arthralgias and back pain. Objective:     /88   Pulse 70   Temp 98.1 °F (36.7 °C)   Ht 5' 4\" (1.626 m)   Wt 217 lb 9.6 oz (98.7 kg)   SpO2 96%   BMI 37.35 kg/m²   Physical Exam  Vitals and nursing note reviewed. Constitutional:       Appearance: Normal appearance. HENT:      Head: Normocephalic and atraumatic. Abdominal:      General: Bowel sounds are normal. There is no distension. Palpations: Abdomen is soft. There is no mass. Tenderness: There is no abdominal tenderness. There is no guarding. Hernia: No hernia is present. Comments: No hernia palpated in right groin/vulva area but is tender there   Musculoskeletal:      Comments: Tender right pelvic groin/inner ASIS muscle  Tender right greater trochanter. Hip ROM wnl has some pain  Slightly tender to palpation right SI joint. Skin:     General: Skin is warm. Neurological:      Mental Status: She is alert and oriented to person, place, and time. Psychiatric:         Mood and Affect: Mood normal.         Behavior: Behavior normal.         Thought Content: Thought content normal.       Assessment:       Diagnosis Orders   1. Right hip pain  XR HIP RIGHT MIN [id number] [facility]      2.  Stage 3 chronic kidney disease, unspecified whether stage 3a or 3b CKD

## 2023-04-03 ENCOUNTER — HOSPITAL ENCOUNTER (OUTPATIENT)
Dept: GENERAL RADIOLOGY | Age: 73
Discharge: HOME | End: 2023-04-05
Payer: MEDICARE

## 2023-04-03 ENCOUNTER — HOSPITAL ENCOUNTER (OUTPATIENT)
Age: 73
Discharge: HOME | End: 2023-04-05
Payer: MEDICARE

## 2023-04-03 DIAGNOSIS — M53.3 SACROILIAC JOINT DISEASE: ICD-10-CM

## 2023-04-03 DIAGNOSIS — M25.551 RIGHT HIP PAIN: ICD-10-CM

## 2023-04-03 PROCEDURE — 73502 X-RAY EXAM HIP UNI 2-3 VIEWS: CPT

## 2023-04-04 NOTE — RESULT ENCOUNTER NOTE
Right hip x-ray shows hardware is in the proper place. Pelvic bone is intact.   Pain is probably coming from tight muscles or tendons do gentle stretching

## 2023-04-10 ENCOUNTER — OFFICE VISIT (OUTPATIENT)
Dept: ORTHOPEDIC SURGERY | Age: 73
End: 2023-04-10
Payer: MEDICARE

## 2023-04-10 VITALS — WEIGHT: 217 LBS | RESPIRATION RATE: 14 BRPM | BODY MASS INDEX: 37.05 KG/M2 | HEIGHT: 64 IN

## 2023-04-10 DIAGNOSIS — M19.011 OSTEOARTHRITIS OF RIGHT GLENOHUMERAL JOINT: Primary | ICD-10-CM

## 2023-04-10 PROCEDURE — 20610 DRAIN/INJ JOINT/BURSA W/O US: CPT | Performed by: ORTHOPAEDIC SURGERY

## 2023-04-10 PROCEDURE — 99999 PR OFFICE/OUTPT VISIT,PROCEDURE ONLY: CPT | Performed by: ORTHOPAEDIC SURGERY

## 2023-04-10 RX ORDER — TRIAMCINOLONE ACETONIDE 40 MG/ML
40 INJECTION, SUSPENSION INTRA-ARTICULAR; INTRAMUSCULAR ONCE
Status: COMPLETED | OUTPATIENT
Start: 2023-04-10 | End: 2023-04-10

## 2023-04-10 RX ORDER — LIDOCAINE HYDROCHLORIDE 10 MG/ML
5 INJECTION, SOLUTION INFILTRATION; PERINEURAL ONCE
Status: COMPLETED | OUTPATIENT
Start: 2023-04-10 | End: 2023-04-10

## 2023-04-10 RX ADMIN — TRIAMCINOLONE ACETONIDE 40 MG: 40 INJECTION, SUSPENSION INTRA-ARTICULAR; INTRAMUSCULAR at 14:57

## 2023-04-10 RX ADMIN — LIDOCAINE HYDROCHLORIDE 5 ML: 10 INJECTION, SOLUTION INFILTRATION; PERINEURAL at 14:57

## 2023-04-20 ENCOUNTER — HOSPITAL ENCOUNTER (OUTPATIENT)
Dept: PHYSICAL THERAPY | Age: 73
Setting detail: THERAPIES SERIES
Discharge: HOME | End: 2023-04-20
Payer: MEDICARE

## 2023-04-20 PROCEDURE — 97162 PT EVAL MOD COMPLEX 30 MIN: CPT

## 2023-04-20 PROCEDURE — 97110 THERAPEUTIC EXERCISES: CPT

## 2023-04-21 NOTE — CONSULTS
[x] [facility]) - [facility] & Therapy  [address]  [place]: [phone]   F: [phone]    Physical Therapy Lower Extremity Evaluation    Date:  2023  Patient: Anyi Yu  : 1950  MRN: [id number]  Physician: Danilo Ashton MD     Insurance: / [facility] supplement Medical necessity  Medical Diagnosis:  (R) hip pain M25.551, SI disease M53.3  Rehab Codes: (R) hip pain M25.551, weakness M25.351  Onset date: referral 3/31/23  Next Dr's appt.: PRN  Visit Count:    Cancel/No Show: 0/0    Subjective: Patient presents to therapy with complaints of (R) anterior and (R) lateral hip pain. Noted insidious onset of symptoms that started around 3/28/23 per the patient. Patient initially noted severe/significant issues with actively lifting the leg up in the air to get in/out of car, with prolonged standing and walking. She has noted significantly improved symptoms in the last week. Still notes issues with these issues minimally as well as issues with full lateral hip and anterior hip strength with weakness (R) versus (L). Does have a history of (B) hip replacement ~9 years ago (R) and 7 years ago (L). Patient presents as possible hip flexor issue and was given exercises accordingly. CC:complaints of anterior and lateral hip discomfort. HPI: insidious onset of symptoms 3/28, physician referral 3/31/23    PMHx: [] Unremarkable [] Diabetes [x] HTN  [] Pacemaker   [] MI/Heart Problems [] Cancer [x] Arthritis [] Other:              [x] Refer to full medical chart  In EPIC     Comorbidities:   [x] Obesity [] Dialysis  [] Other:   [x] Asthma/COPD [] Dementia [] Other:   [] Stroke [] Sleep apnea [] Other:   [] Vascular disease [] Rheumatic disease [] Other:     Tests: [x] X-Ray:3/31/23 [] MRI:  [] Other:   FINDINGS:   Total bilateral hip arthroplasty. No acute osseous abnormality. Right hip   alignment is anatomic.   No radiographic evidence of hardware failure

## 2023-04-25 ENCOUNTER — HOSPITAL ENCOUNTER (OUTPATIENT)
Dept: PHYSICAL THERAPY | Age: 73
Setting detail: THERAPIES SERIES
Discharge: HOME | End: 2023-04-25
Payer: MEDICARE

## 2023-04-25 PROCEDURE — 97110 THERAPEUTIC EXERCISES: CPT

## 2023-04-25 NOTE — FLOWSHEET NOTE
[] [facility]) - [facility] & Therapy  [address]  [place]: [phone]   F: [phone]    Physical Therapy Daily Treatment Note      Date:  2023  Patient Name:  Gloira Victoria    :  1950  MRN: [id number]  Physician: Sneha Uribe MD                                    Insurance: / [facility] supplement Medical necessity  Medical Diagnosis:  (R) hip pain M25.551, SI disease M53.3                   Rehab Codes: (R) hip pain M25.551, weakness M25.351  Onset date: referral 3/31/23             Next Dr's appt.: PRN  Visit Count:                                   Cancel/No Show: 0/0    Subjective:    :Patient arrives and reports compliance with HEP states she does feel sore in abdominal region that may be caused by bridging exercise. Denies pain in R hip at this time. Pain:  [] Yes  [x] No Location: R anterior/lateral hip Pain Rating: (0-10 scale) denies/10  Pain altered Tx:  [] No  [] Yes  Action:  Comments:    Objective:  Modalities:   Precautions: B ALEKSANDRA anterior approach   Exercises:  Exercise Reps/ Time Weight/ Level Completed  Today Comments   Modified parminder stretch  30\"x3  x    LTR 30\"x3 ea   x    BKFO 20x ea Green  x    R clamshell  20x  Green  x    R hip abd  20x  green x Resistance above knee level           STS 10x2  x    Step ups fwd/lat  10x2 6in step x    Mini lunge fwd/lat  10x  x             Other: Reviewed HEP 5'   Specific Instructions for next treatment: prone hip ext, standing 3 way hip with resistance, mini squats       Assessment:   :First session post evaluation- patient completed all exercises to tolerance with emphasis on proximal hip strengthening and functional strengthening this date. Patient did not have increased pain symptoms throughout session but general fatigued noted. Briefly reviewed HEP with patient demonstrating good understanding of each. [x] Progressing toward goals. [] No change.      [] Other:    [] Patient would continue

## 2023-04-26 ENCOUNTER — OFFICE VISIT (OUTPATIENT)
Dept: PRIMARY CARE CLINIC | Age: 73
End: 2023-04-26
Payer: MEDICARE

## 2023-04-26 VITALS
HEART RATE: 83 BPM | WEIGHT: 213.6 LBS | HEIGHT: 64 IN | DIASTOLIC BLOOD PRESSURE: 80 MMHG | SYSTOLIC BLOOD PRESSURE: 132 MMHG | BODY MASS INDEX: 36.47 KG/M2 | OXYGEN SATURATION: 95 %

## 2023-04-26 DIAGNOSIS — Z00.00 MEDICARE ANNUAL WELLNESS VISIT, SUBSEQUENT: Primary | ICD-10-CM

## 2023-04-26 DIAGNOSIS — E66.01 SEVERE OBESITY (BMI 35.0-39.9) WITH COMORBIDITY (HCC): ICD-10-CM

## 2023-04-26 DIAGNOSIS — I10 ESSENTIAL HYPERTENSION: ICD-10-CM

## 2023-04-26 PROCEDURE — G0439 PPPS, SUBSEQ VISIT: HCPCS | Performed by: FAMILY MEDICINE

## 2023-04-26 PROCEDURE — 3017F COLORECTAL CA SCREEN DOC REV: CPT | Performed by: FAMILY MEDICINE

## 2023-04-26 PROCEDURE — 1123F ACP DISCUSS/DSCN MKR DOCD: CPT | Performed by: FAMILY MEDICINE

## 2023-04-26 PROCEDURE — 3079F DIAST BP 80-89 MM HG: CPT | Performed by: FAMILY MEDICINE

## 2023-04-26 PROCEDURE — 3075F SYST BP GE 130 - 139MM HG: CPT | Performed by: FAMILY MEDICINE

## 2023-04-26 SDOH — HEALTH STABILITY: PHYSICAL HEALTH: ON AVERAGE, HOW MANY MINUTES DO YOU ENGAGE IN EXERCISE AT THIS LEVEL?: 30 MIN

## 2023-04-26 SDOH — HEALTH STABILITY: PHYSICAL HEALTH: ON AVERAGE, HOW MANY DAYS PER WEEK DO YOU ENGAGE IN MODERATE TO STRENUOUS EXERCISE (LIKE A BRISK WALK)?: 4 DAYS

## 2023-04-26 ASSESSMENT — PATIENT HEALTH QUESTIONNAIRE - PHQ9
SUM OF ALL RESPONSES TO PHQ9 QUESTIONS 1 & 2: 0
SUM OF ALL RESPONSES TO PHQ QUESTIONS 1-9: 0
1. LITTLE INTEREST OR PLEASURE IN DOING THINGS: 0
SUM OF ALL RESPONSES TO PHQ QUESTIONS 1-9: 0
2. FEELING DOWN, DEPRESSED OR HOPELESS: 0
SUM OF ALL RESPONSES TO PHQ QUESTIONS 1-9: 0
SUM OF ALL RESPONSES TO PHQ QUESTIONS 1-9: 0

## 2023-04-26 ASSESSMENT — LIFESTYLE VARIABLES
HOW OFTEN DO YOU HAVE A DRINK CONTAINING ALCOHOL: 2
HOW OFTEN DO YOU HAVE A DRINK CONTAINING ALCOHOL: MONTHLY OR LESS
HOW OFTEN DO YOU HAVE SIX OR MORE DRINKS ON ONE OCCASION: 1
HOW MANY STANDARD DRINKS CONTAINING ALCOHOL DO YOU HAVE ON A TYPICAL DAY: 1
HOW MANY STANDARD DRINKS CONTAINING ALCOHOL DO YOU HAVE ON A TYPICAL DAY: 1 OR 2

## 2023-04-26 NOTE — PROGRESS NOTES
ill-appearing. HENT:      Head: Normocephalic and atraumatic. Right Ear: External ear normal.      Left Ear: External ear normal.   Eyes:      General: No scleral icterus. Right eye: No discharge. Left eye: No discharge. Conjunctiva/sclera: Conjunctivae normal.   Neck:      Thyroid: No thyromegaly. Trachea: No tracheal deviation. Cardiovascular:      Rate and Rhythm: Normal rate and regular rhythm. Heart sounds: Normal heart sounds. Pulmonary:      Effort: Pulmonary effort is normal. No respiratory distress. Breath sounds: Normal breath sounds. No wheezing. Musculoskeletal:      Right lower leg: No edema. Left lower leg: No edema. Lymphadenopathy:      Cervical: No cervical adenopathy. Skin:     General: Skin is warm. Findings: No rash. Neurological:      Mental Status: She is alert and oriented to person, place, and time. Psychiatric:         Mood and Affect: Mood normal.         Behavior: Behavior normal.         Thought Content: Thought content normal.                Allergies   Allergen Reactions    Ciprofloxacin Other (See Comments)     Patient relates only IV    Codeine     Lisinopril     Morphine     Norvasc [Amlodipine Besylate]     Penicillins     Sanctura [Trospium] Nausea Only     Bloated, nauseated    Xarelto [Rivaroxaban]     Macrobid [Nitrofurantoin] Other (See Comments)     headaches     Prior to Visit Medications    Medication Sig Taking?  Authorizing Provider   cloNIDine (CATAPRES) 0.3 MG tablet TAKE ONE TABLET BY MOUTH ONCE NIGHTLY Yes Modesto Hoff MD   simvastatin (ZOCOR) 20 MG tablet TAKE ONE TABLET BY MOUTH DAILY Yes Modesto Hoff MD   spironolactone (ALDACTONE) 50 MG tablet Take 1 tablet by mouth daily with lunch Yes Modesto Hoff MD   estradiol (ESTRACE) 0.1 MG/GM vaginal cream 1 gram at HS Yes Historical Provider, MD   Cranberry 200 MG CAPS Take by mouth Yes Historical Provider, MD   Handicap Placard MISC by Does not

## 2023-05-01 ENCOUNTER — HOSPITAL ENCOUNTER (OUTPATIENT)
Dept: PHYSICAL THERAPY | Age: 73
Setting detail: THERAPIES SERIES
Discharge: HOME | End: 2023-05-01
Payer: MEDICARE

## 2023-05-01 PROCEDURE — 97110 THERAPEUTIC EXERCISES: CPT

## 2023-05-01 NOTE — FLOWSHEET NOTE
[] [facility]) - [facility] [place] [facility] & Therapy  [address]  [place]: [phone]   F: [phone]    Physical Therapy Daily Treatment Note      Date:  2023  Patient Name:  Asher Mixon    :  1950  MRN: [id number]  Physician: Jair Amezquita MD                                    Insurance: / [facility] supplement Medical necessity  Medical Diagnosis:  (R) hip pain M25.551, SI disease M53.3                   Rehab Codes: (R) hip pain M25.551, weakness M25.351  Onset date: referral 3/31/23             Next Dr's appt.: PRN  Visit Count: 3/8                                  Cancel/No Show: 0/0    Subjective:    : patient arrived and reports she continues to have no pain. Patient states she spoke to her doctor and doctor told her to finish out what she had scheduled and then D/C to HEP. Pain:  [] Yes  [x] No Location: R anterior/lateral hip Pain Rating: (0-10 scale) denies/10  Pain altered Tx:  [] No  [] Yes  Action:  Comments:    Objective:  Modalities:   Precautions: B ALEKSANDRA anterior approach   Exercises:  Exercise Reps/ Time Weight/ Level Completed  Today Comments   Modified parminder stretch  30\"x3  x    LTR 30\"x3 ea   x    BKFO 20x ea Green  x    R clamshell  20x  Green  x    R hip abd  20x  green x Resistance above knee level           STS 10x2  x    Step ups fwd/lat  10x2 6in step x    Mini lunge fwd/lat  10x  x    3 way hip  10x2 Red  x             Other: Reviewed HEP 5'   Specific Instructions for next treatment: prone hip ext, mini squats       Assessment:   : Patient completed all exercises to tolerance with emphasis on LE mobility and strengthening. Patient did not have increased pain symptoms with exercises. General fatigue noted at end of session. Most cueing required for technique with standing exercise this date. [x] Progressing toward goals. [] No change.      [] Other:    [] Patient would continue to benefit from skilled physical therapy services in order to: work

## 2023-05-08 ENCOUNTER — HOSPITAL ENCOUNTER (OUTPATIENT)
Dept: PHYSICAL THERAPY | Age: 73
Setting detail: THERAPIES SERIES
Discharge: HOME | End: 2023-05-08
Payer: MEDICARE

## 2023-05-08 PROCEDURE — 97110 THERAPEUTIC EXERCISES: CPT

## 2023-05-08 PROCEDURE — 97112 NEUROMUSCULAR REEDUCATION: CPT

## 2023-05-08 NOTE — FLOWSHEET NOTE
[x] [facility]) - [facility] & Therapy  [address]  [place]: [phone]   F: [phone]    Physical Therapy Daily Treatment Note/ PROGRESS NOTE/DISCHARGE      Date:  2023  Patient Name:  Rufino Bradford    :  1950  MRN: [id number]  Physician: Stanley Chacon MD                                    Insurance: / [facility] supplement Medical necessity  Medical Diagnosis:  (R) hip pain M25.551, SI disease M53.3                   Rehab Codes: (R) hip pain M25.551, weakness M25.351  Onset date: referral 3/31/23             Next Dr's appt.: PRN  Visit Count:                                   Cancel/No Show: 0/0  Reporting period 23-23    Subjective:    No significant pain in the anterior hip or lateral hip at this time. Patient with some continued complaints of weakness and demonstrates some weakness with hip testing in the clinic- but these are improved overall and should continue to improve. Improved SL bridge, improved ambulation, improved stairs without pain. Pain:  [] Yes  [x] No Location: R anterior/lateral hip Pain Rating: (0-10 scale) denies/10  Pain altered Tx:  [] No  [] Yes  Action:  Comments:    Objective:  ROM:  Hip abduction 50 degrees (B)  Flexion (SLR) 70 degrees (B)  ER 55 degrees (R), (L) 40 degrees  IR 20 degrees, (L) 15 degrees  Presence of (B) ALEKSANDRA anterior approach.      Strength:  Able to do (B) bridge without significant compensation  Hip ER 4+/5 (B)  Hip flexion, abduction 4+/5 (R), (L) 4+/5  SL bridge with NO SIGNIFICANT compensation (R) versus (L)     OBSERVATION No Deficit Deficit Not Tested Comments   Posture           Forward Head []  []  []      Rounded Shoulders []  []  []      Kyphosis []  []  []      Lordosis []  []  []      Lateral Shift []  []  []      Scoliosis []  []  []      Iliac Crest []  []  []      PSIS []  []  []      ASIS []  []  []      Genu Valgus []  []  []      Genu Varus []  []  []      Genu Recurvatum []  []  []

## 2023-07-31 ENCOUNTER — NURSE ONLY (OUTPATIENT)
Dept: PRIMARY CARE CLINIC | Age: 73
End: 2023-07-31

## 2023-07-31 VITALS
SYSTOLIC BLOOD PRESSURE: 122 MMHG | OXYGEN SATURATION: 95 % | DIASTOLIC BLOOD PRESSURE: 84 MMHG | BODY MASS INDEX: 38.17 KG/M2 | HEART RATE: 73 BPM | WEIGHT: 222.4 LBS

## 2023-07-31 RX ORDER — ZOSTER VACCINE RECOMBINANT, ADJUVANTED 50 MCG/0.5
0.5 KIT INTRAMUSCULAR SEE ADMIN INSTRUCTIONS
Qty: 0.5 ML | Refills: 0 | Status: CANCELLED | OUTPATIENT
Start: 2023-07-31 | End: 2024-01-27

## 2023-07-31 NOTE — PROGRESS NOTES
After obtaining consent, and per orders of Dr. Tana Diez, injection of Shingrix given in Right deltoid by Osvaldo Elias MA. Patient tolerated procedure well.

## 2023-08-04 ENCOUNTER — HOSPITAL ENCOUNTER (OUTPATIENT)
Age: 73
Setting detail: SPECIMEN
Discharge: HOME | End: 2023-08-04

## 2023-08-04 DIAGNOSIS — I10 ESSENTIAL HYPERTENSION: ICD-10-CM

## 2023-08-04 DIAGNOSIS — E66.01 SEVERE OBESITY (BMI 35.0-39.9) WITH COMORBIDITY (HCC): ICD-10-CM

## 2023-08-04 LAB — TSH SERPL DL<=0.05 MIU/L-ACNC: 3.16 UIU/ML (ref 0.3–5)

## 2023-09-28 DIAGNOSIS — I10 ESSENTIAL HYPERTENSION: ICD-10-CM

## 2023-09-28 RX ORDER — SPIRONOLACTONE 50 MG/1
TABLET, FILM COATED ORAL
Qty: 90 TABLET | Refills: 2 | Status: SHIPPED | OUTPATIENT
Start: 2023-09-28

## 2023-09-28 NOTE — TELEPHONE ENCOUNTER
LAST VISIT:   4/26/2023     Future Appointments   Date Time Provider [address]   10/27/2023  9:20 AM MD JOVITA Driver

## 2023-10-13 ENCOUNTER — OFFICE VISIT (OUTPATIENT)
Dept: ORTHOPEDIC SURGERY | Age: 73
End: 2023-10-13

## 2023-10-13 VITALS — RESPIRATION RATE: 14 BRPM | WEIGHT: 212 LBS | BODY MASS INDEX: 36.19 KG/M2 | HEIGHT: 64 IN

## 2023-10-13 DIAGNOSIS — M19.011 OSTEOARTHRITIS OF RIGHT GLENOHUMERAL JOINT: Primary | ICD-10-CM

## 2023-10-13 RX ORDER — LIDOCAINE HYDROCHLORIDE 10 MG/ML
5 INJECTION, SOLUTION INFILTRATION; PERINEURAL ONCE
Status: COMPLETED | OUTPATIENT
Start: 2023-10-13 | End: 2023-10-13

## 2023-10-13 RX ORDER — TRIAMCINOLONE ACETONIDE 40 MG/ML
40 INJECTION, SUSPENSION INTRA-ARTICULAR; INTRAMUSCULAR ONCE
Status: COMPLETED | OUTPATIENT
Start: 2023-10-13 | End: 2023-10-13

## 2023-10-13 RX ADMIN — LIDOCAINE HYDROCHLORIDE 5 ML: 10 INJECTION, SOLUTION INFILTRATION; PERINEURAL at 11:33

## 2023-10-13 RX ADMIN — TRIAMCINOLONE ACETONIDE 40 MG: 40 INJECTION, SUSPENSION INTRA-ARTICULAR; INTRAMUSCULAR at 11:34

## 2023-10-13 NOTE — PROGRESS NOTES
HPI: Ms. Ifrah Bliss is a 24-year-old lady presenting today requesting a repeat cortisone injection to her right shoulder. She has been seen in the past for her right shoulder and has been diagnosed with severe glenohumeral joint osteoarthritis. She has been managed thus far with a couple of cortisone injections. The last injection came on 4/10/2023 and she states that it worked well for her lasting about 4 months. Given the fact that the injections have worked well for her I did move ahead with the requested injection as outlined below. She indicates that she would like to make it past the holiday and is considering surgery after that. I will have her follow-up in my clinic as needed but she may return or call at anytime with persistent or worsening symptoms or with any questions or concerns. Procedure: right shoulder glenohumeral injection  Following an appropriate discussion with the patient regarding the risks and benefits of the procedure she consented to proceed. her right shoulder was prepped using chlorhexadine solution. Using aseptic technique and through a posterior approach, her right shoulder glenohumeral joint space was injected with a 6 cc mixture of 1cc 40mg/ml kenalog and 5 cc of 1% lidocaine without epinephrine. A band aid was applied to the injection site. she tolerated the injection with no immediate adverse reactions.

## 2023-10-26 ENCOUNTER — OFFICE VISIT (OUTPATIENT)
Dept: PODIATRY | Age: 73
End: 2023-10-26
Payer: MEDICARE

## 2023-10-26 VITALS — HEIGHT: 64 IN | BODY MASS INDEX: 36.19 KG/M2 | WEIGHT: 212 LBS

## 2023-10-26 DIAGNOSIS — M79.675 PAIN OF TOES OF BOTH FEET: ICD-10-CM

## 2023-10-26 DIAGNOSIS — M79.674 PAIN OF TOES OF BOTH FEET: ICD-10-CM

## 2023-10-26 DIAGNOSIS — B35.1 ONYCHOMYCOSIS OF TOENAIL: Primary | ICD-10-CM

## 2023-10-26 PROCEDURE — G8399 PT W/DXA RESULTS DOCUMENT: HCPCS | Performed by: PODIATRIST

## 2023-10-26 PROCEDURE — G8427 DOCREV CUR MEDS BY ELIG CLIN: HCPCS | Performed by: PODIATRIST

## 2023-10-26 PROCEDURE — 1123F ACP DISCUSS/DSCN MKR DOCD: CPT | Performed by: PODIATRIST

## 2023-10-26 PROCEDURE — G8484 FLU IMMUNIZE NO ADMIN: HCPCS | Performed by: PODIATRIST

## 2023-10-26 PROCEDURE — G8417 CALC BMI ABV UP PARAM F/U: HCPCS | Performed by: PODIATRIST

## 2023-10-26 PROCEDURE — 3017F COLORECTAL CA SCREEN DOC REV: CPT | Performed by: PODIATRIST

## 2023-10-26 PROCEDURE — 1090F PRES/ABSN URINE INCON ASSESS: CPT | Performed by: PODIATRIST

## 2023-10-26 PROCEDURE — 1036F TOBACCO NON-USER: CPT | Performed by: PODIATRIST

## 2023-10-26 PROCEDURE — 11721 DEBRIDE NAIL 6 OR MORE: CPT | Performed by: PODIATRIST

## 2023-10-26 PROCEDURE — 99202 OFFICE O/P NEW SF 15 MIN: CPT | Performed by: PODIATRIST

## 2023-10-26 ASSESSMENT — ENCOUNTER SYMPTOMS
SHORTNESS OF BREATH: 0
NAUSEA: 0
COLOR CHANGE: 0
BACK PAIN: 0
DIARRHEA: 0

## 2023-10-26 NOTE — PROGRESS NOTES
Nancy Huerta is a 68 y.o. female who presents to the office today with chief complaint of possible ingrowth to both big toenails, left greater than right. Chief Complaint   Patient presents with    Nail Problem     B/l hallux nails painful    Symptoms began about 6 month(s) ago. Patient denies injury to the feet. Patient states that the pain is greatest with shoe gear and ambulation. Pain is rated 8 out of 10 at it's worst and is described as intermittent. Treatments prior to today's visit include: None. Allergies   Allergen Reactions    Ciprofloxacin Other (See Comments)     Patient relates only IV    Codeine     Lisinopril     Morphine     Norvasc [Amlodipine Besylate]     Penicillins     Sanctura [Trospium] Nausea Only     Bloated, nauseated    Xarelto [Rivaroxaban]     Macrobid [Nitrofurantoin] Other (See Comments)     headaches       Past Medical History:   Diagnosis Date    Arthritis     Asthma     \"winter\" asthma    Bronchitis     Constipation     GERD (gastroesophageal reflux disease)     Hyperlipidemia     Hypertension     Hypokalemia     Overactive bladder     Wears glasses        Prior to Admission medications    Medication Sig Start Date End Date Taking?  Authorizing Provider   spironolactone (ALDACTONE) 50 MG tablet TAKE ONE TABLET BY MOUTH DAILY WITH LUNCH 9/28/23  Yes Sumaya Lee MD   cloNIDine (CATAPRES) 0.3 MG tablet TAKE ONE TABLET BY MOUTH ONCE NIGHTLY 1/3/23  Yes Sumaya Lee MD   simvastatin (ZOCOR) 20 MG tablet TAKE ONE TABLET BY MOUTH DAILY 1/3/23  Yes Sumaya Lee MD   estradiol (ESTRACE) 0.1 MG/GM vaginal cream 1 gram at [facility] 3/23/22  Yes Niels Can MD   Cranberry 200 MG CAPS Take by mouth   Yes Provider, Historical, MD   Handicap Placard MISC by Does not apply route For orthopedic issues, expires 9/16/2027 9/16/22  Yes Sumaya Lee MD   triamcinolone (KENALOG) 0.1 % ointment  1/14/22  Yes Niels Can MD   Coenzyme Q10 (CO Q 10) 100 MG CAPS Take

## 2023-10-27 ENCOUNTER — OFFICE VISIT (OUTPATIENT)
Dept: PRIMARY CARE CLINIC | Age: 73
End: 2023-10-27

## 2023-10-27 VITALS
DIASTOLIC BLOOD PRESSURE: 100 MMHG | HEART RATE: 66 BPM | HEIGHT: 64 IN | OXYGEN SATURATION: 97 % | SYSTOLIC BLOOD PRESSURE: 140 MMHG | BODY MASS INDEX: 37.08 KG/M2 | WEIGHT: 217.2 LBS

## 2023-10-27 DIAGNOSIS — Z23 NEED FOR VACCINATION: ICD-10-CM

## 2023-10-27 DIAGNOSIS — N18.30 STAGE 3 CHRONIC KIDNEY DISEASE, UNSPECIFIED WHETHER STAGE 3A OR 3B CKD (HCC): ICD-10-CM

## 2023-10-27 DIAGNOSIS — I10 ESSENTIAL HYPERTENSION: Primary | ICD-10-CM

## 2023-10-27 DIAGNOSIS — R00.2 PALPITATION: ICD-10-CM

## 2023-10-27 DIAGNOSIS — E78.2 MIXED HYPERLIPIDEMIA: ICD-10-CM

## 2023-10-27 ASSESSMENT — ENCOUNTER SYMPTOMS: RESPIRATORY NEGATIVE: 1

## 2023-11-14 ENCOUNTER — HOSPITAL ENCOUNTER (OUTPATIENT)
Age: 73
Discharge: HOME | End: 2023-11-14
Payer: MEDICARE

## 2023-11-14 DIAGNOSIS — R00.2 PALPITATION: ICD-10-CM

## 2023-11-14 LAB
EKG ATRIAL RATE: 69 BPM
EKG P AXIS: 50 DEGREES
EKG P-R INTERVAL: 158 MS
EKG Q-T INTERVAL: 416 MS
EKG QRS DURATION: 82 MS
EKG QTC CALCULATION (BAZETT): 445 MS
EKG R AXIS: 50 DEGREES
EKG T AXIS: 63 DEGREES
EKG VENTRICULAR RATE: 69 BPM

## 2023-11-14 PROCEDURE — 93005 ELECTROCARDIOGRAM TRACING: CPT

## 2023-11-14 PROCEDURE — 93010 ELECTROCARDIOGRAM REPORT: CPT | Performed by: INTERNAL MEDICINE

## 2023-11-15 DIAGNOSIS — R94.31 ABNORMAL EKG: Primary | ICD-10-CM

## 2023-11-15 DIAGNOSIS — I10 ESSENTIAL HYPERTENSION: ICD-10-CM

## 2023-11-15 RX ORDER — SODIUM CHLORIDE 9 MG/ML
500 INJECTION, SOLUTION INTRAVENOUS CONTINUOUS PRN
OUTPATIENT
Start: 2023-11-15 | End: 2023-11-15

## 2023-11-15 RX ORDER — ATROPINE SULFATE 0.1 MG/ML
0.5 INJECTION INTRAVENOUS EVERY 5 MIN PRN
OUTPATIENT
Start: 2023-11-15 | End: 2023-11-15

## 2023-11-15 RX ORDER — REGADENOSON 0.08 MG/ML
0.4 INJECTION, SOLUTION INTRAVENOUS
OUTPATIENT
Start: 2023-11-15

## 2023-11-15 RX ORDER — SODIUM CHLORIDE 0.9 % (FLUSH) 0.9 %
10 SYRINGE (ML) INJECTION PRN
OUTPATIENT
Start: 2023-11-15 | End: 2023-11-15

## 2023-11-15 RX ORDER — METOPROLOL TARTRATE 5 MG/5ML
5 INJECTION INTRAVENOUS EVERY 5 MIN PRN
OUTPATIENT
Start: 2023-11-15 | End: 2023-11-15

## 2023-11-15 RX ORDER — METOPROLOL SUCCINATE 25 MG/1
25 TABLET, EXTENDED RELEASE ORAL DAILY
Qty: 30 TABLET | Refills: 3 | Status: SHIPPED | OUTPATIENT
Start: 2023-11-15

## 2023-11-15 RX ORDER — NITROGLYCERIN 0.4 MG/1
0.4 TABLET SUBLINGUAL EVERY 5 MIN PRN
OUTPATIENT
Start: 2023-11-15 | End: 2023-11-15

## 2023-11-15 RX ORDER — ALBUTEROL SULFATE 90 UG/1
2 AEROSOL, METERED RESPIRATORY (INHALATION) PRN
OUTPATIENT
Start: 2023-11-15 | End: 2023-11-15

## 2023-11-15 RX ORDER — AMINOPHYLLINE 25 MG/ML
50 INJECTION, SOLUTION INTRAVENOUS PRN
OUTPATIENT
Start: 2023-11-15 | End: 2023-11-15

## 2023-11-15 NOTE — RESULT ENCOUNTER NOTE
EKG shows premature beats off and on and also possible previous heart damage. I would like her to get a stress test:orders in: doesn't have to use the treadmill    I would like her to start a small dose toprol to protect the heart and help the BP, sent in.

## 2023-11-20 ENCOUNTER — HOSPITAL ENCOUNTER (OUTPATIENT)
Dept: NUCLEAR MEDICINE | Age: 73
Discharge: HOME | End: 2023-11-22
Payer: MEDICARE

## 2023-11-20 ENCOUNTER — HOSPITAL ENCOUNTER (OUTPATIENT)
Age: 73
Discharge: HOME | End: 2023-11-22
Payer: MEDICARE

## 2023-11-20 DIAGNOSIS — R94.31 ABNORMAL EKG: ICD-10-CM

## 2023-11-20 LAB
STRESS BASELINE DIAS BP: 85 MMHG
STRESS BASELINE HR: 70 BPM
STRESS BASELINE SYS BP: 128 MMHG
STRESS ESTIMATED WORKLOAD: 1 METS
STRESS PEAK DIAS BP: 94 MMHG
STRESS PEAK SYS BP: 173 MMHG
STRESS PERCENT HR ACHIEVED: 83 %
STRESS POST PEAK HR: 122 BPM
STRESS RATE PRESSURE PRODUCT: NORMAL BPM*MMHG
STRESS STAGE RECOVERY 1 BP: NORMAL MMHG
STRESS STAGE RECOVERY 1 DURATION: 1 MIN:SEC
STRESS STAGE RECOVERY 1 HR: 111 BPM
STRESS STAGE RECOVERY 2 BP: NORMAL MMHG
STRESS STAGE RECOVERY 2 DURATION: 7 MIN:SEC
STRESS STAGE RECOVERY 2 HR: 93 BPM
STRESS TARGET HR: 147 BPM
TID: 0.96

## 2023-11-20 PROCEDURE — 3430000000 HC RX DIAGNOSTIC RADIOPHARMACEUTICAL: Performed by: FAMILY MEDICINE

## 2023-11-20 PROCEDURE — 93017 CV STRESS TEST TRACING ONLY: CPT

## 2023-11-20 PROCEDURE — 6360000002 HC RX W HCPCS: Performed by: FAMILY MEDICINE

## 2023-11-20 PROCEDURE — 78452 HT MUSCLE IMAGE SPECT MULT: CPT

## 2023-11-20 PROCEDURE — A9500 TC99M SESTAMIBI: HCPCS | Performed by: FAMILY MEDICINE

## 2023-11-20 PROCEDURE — 2580000003 HC RX 258: Performed by: FAMILY MEDICINE

## 2023-11-20 RX ORDER — SODIUM CHLORIDE 9 MG/ML
500 INJECTION, SOLUTION INTRAVENOUS CONTINUOUS PRN
Status: ACTIVE | OUTPATIENT
Start: 2023-11-20 | End: 2023-11-20

## 2023-11-20 RX ORDER — SODIUM CHLORIDE 0.9 % (FLUSH) 0.9 %
5-40 SYRINGE (ML) INJECTION PRN
Status: ACTIVE | OUTPATIENT
Start: 2023-11-20 | End: 2023-11-20

## 2023-11-20 RX ORDER — NITROGLYCERIN 0.4 MG/1
0.4 TABLET SUBLINGUAL EVERY 5 MIN PRN
Status: ACTIVE | OUTPATIENT
Start: 2023-11-20 | End: 2023-11-20

## 2023-11-20 RX ORDER — TETRAKIS(2-METHOXYISOBUTYLISOCYANIDE)COPPER(I) TETRAFLUOROBORATE 1 MG/ML
30 INJECTION, POWDER, LYOPHILIZED, FOR SOLUTION INTRAVENOUS
Status: COMPLETED | OUTPATIENT
Start: 2023-11-20 | End: 2023-11-20

## 2023-11-20 RX ORDER — METOPROLOL TARTRATE 1 MG/ML
5 INJECTION, SOLUTION INTRAVENOUS EVERY 5 MIN PRN
Status: ACTIVE | OUTPATIENT
Start: 2023-11-20 | End: 2023-11-20

## 2023-11-20 RX ORDER — ATROPINE SULFATE 0.1 MG/ML
0.5 INJECTION INTRAVENOUS EVERY 5 MIN PRN
Status: ACTIVE | OUTPATIENT
Start: 2023-11-20 | End: 2023-11-20

## 2023-11-20 RX ORDER — ALBUTEROL SULFATE 90 UG/1
2 AEROSOL, METERED RESPIRATORY (INHALATION) PRN
Status: ACTIVE | OUTPATIENT
Start: 2023-11-20 | End: 2023-11-20

## 2023-11-20 RX ORDER — TETRAKIS(2-METHOXYISOBUTYLISOCYANIDE)COPPER(I) TETRAFLUOROBORATE 1 MG/ML
10 INJECTION, POWDER, LYOPHILIZED, FOR SOLUTION INTRAVENOUS
Status: COMPLETED | OUTPATIENT
Start: 2023-11-20 | End: 2023-11-20

## 2023-11-20 RX ORDER — REGADENOSON 0.08 MG/ML
0.4 INJECTION, SOLUTION INTRAVENOUS
Status: COMPLETED | OUTPATIENT
Start: 2023-11-20 | End: 2023-11-20

## 2023-11-20 RX ORDER — SODIUM CHLORIDE 0.9 % (FLUSH) 0.9 %
10 SYRINGE (ML) INJECTION PRN
Status: DISCONTINUED | OUTPATIENT
Start: 2023-11-20 | End: 2023-11-23 | Stop reason: HOSPADM

## 2023-11-20 RX ORDER — AMINOPHYLLINE 25 MG/ML
50 INJECTION, SOLUTION INTRAVENOUS PRN
Status: ACTIVE | OUTPATIENT
Start: 2023-11-20 | End: 2023-11-20

## 2023-11-20 RX ADMIN — Medication 10.5 MILLICURIE: at 07:11

## 2023-11-20 RX ADMIN — REGADENOSON 0.4 MG: 0.08 INJECTION, SOLUTION INTRAVENOUS at 08:53

## 2023-11-20 RX ADMIN — Medication 35.9 MILLICURIE: at 08:56

## 2023-11-20 RX ADMIN — SODIUM CHLORIDE, PRESERVATIVE FREE 10 ML: 5 INJECTION INTRAVENOUS at 07:12

## 2023-11-22 DIAGNOSIS — R94.39 ABNORMAL STRESS TEST: Primary | ICD-10-CM

## 2023-12-27 DIAGNOSIS — I10 ESSENTIAL HYPERTENSION: ICD-10-CM

## 2023-12-27 RX ORDER — CLONIDINE HYDROCHLORIDE 0.3 MG/1
TABLET ORAL
Qty: 90 TABLET | Refills: 3 | Status: SHIPPED | OUTPATIENT
Start: 2023-12-27

## 2023-12-27 RX ORDER — SIMVASTATIN 20 MG
TABLET ORAL
Qty: 90 TABLET | Refills: 3 | Status: SHIPPED | OUTPATIENT
Start: 2023-12-27

## 2024-01-25 ENCOUNTER — OFFICE VISIT (OUTPATIENT)
Dept: PODIATRY | Age: 74
End: 2024-01-25
Payer: MEDICARE

## 2024-01-25 VITALS — BODY MASS INDEX: 37.05 KG/M2 | WEIGHT: 217 LBS | HEIGHT: 64 IN

## 2024-01-25 DIAGNOSIS — M79.675 PAIN OF TOES OF BOTH FEET: ICD-10-CM

## 2024-01-25 DIAGNOSIS — M79.674 PAIN OF TOES OF BOTH FEET: ICD-10-CM

## 2024-01-25 DIAGNOSIS — B35.1 ONYCHOMYCOSIS OF TOENAIL: Primary | ICD-10-CM

## 2024-01-25 PROCEDURE — 11721 DEBRIDE NAIL 6 OR MORE: CPT | Performed by: PODIATRIST

## 2024-01-25 PROCEDURE — 99999 PR OFFICE/OUTPT VISIT,PROCEDURE ONLY: CPT | Performed by: PODIATRIST

## 2024-01-25 ASSESSMENT — ENCOUNTER SYMPTOMS
NAUSEA: 0
COLOR CHANGE: 0
SHORTNESS OF BREATH: 0
DIARRHEA: 0
BACK PAIN: 0

## 2024-01-25 NOTE — PROGRESS NOTES
SUBJECTIVE: Muna Lomax is a 73 y.o. female who returns to the office with chief complaint of painful fungal toenails. Patient relates toe nails are thickened/difficult to trim as well as painful with ambulation and with shoe gear.   Chief Complaint   Patient presents with    Nail Problem     B/l nail trim/ last seen Dr. Abbey Plasencia 10/27/2024     Review of Systems   Constitutional:  Negative for activity change, appetite change, chills, diaphoresis, fatigue and fever.   Respiratory:  Negative for shortness of breath.    Cardiovascular:  Negative for leg swelling.   Gastrointestinal:  Negative for diarrhea and nausea.   Endocrine: Negative for cold intolerance, heat intolerance and polyuria.   Musculoskeletal:  Positive for arthralgias. Negative for back pain, gait problem, joint swelling and myalgias.   Skin:  Negative for color change, pallor, rash and wound.   Allergic/Immunologic: Negative for environmental allergies and food allergies.   Neurological:  Negative for dizziness, weakness, light-headedness and numbness.   Hematological:  Does not bruise/bleed easily.   Psychiatric/Behavioral:  Negative for behavioral problems, confusion and self-injury. The patient is not nervous/anxious.      OBJECTIVE: Clinical evaluation of patient reveals nails 1,2,3,4,5 of the right foot and nails 1,2,3,4,5 of the left foot to present with thickness, elongation, discoloration, brittleness, and subungual debris. There was pain with palpation and debridement of the toenails of the bilateral feet. No open lesions noted to either foot today.     Class A Findings (1 needed)   [] Non-traumatic amputation of foot or integral skeleton portion thereof.   [] Q7.      Class B Findings (2 needed)   1. [] Absent posterior tibial pulse   2. [] Absent dorsalis pedis pulse   3. [] Advanced trophic changes; three of the following are required:   ·         [] hair growth (decrease or absence)   ·         [] nail changes (thickening)   ·

## 2024-02-28 ASSESSMENT — PATIENT HEALTH QUESTIONNAIRE - PHQ9
SUM OF ALL RESPONSES TO PHQ QUESTIONS 1-9: 0
2. FEELING DOWN, DEPRESSED OR HOPELESS: 0
1. LITTLE INTEREST OR PLEASURE IN DOING THINGS: 0
SUM OF ALL RESPONSES TO PHQ QUESTIONS 1-9: 0
SUM OF ALL RESPONSES TO PHQ QUESTIONS 1-9: 0
SUM OF ALL RESPONSES TO PHQ9 QUESTIONS 1 & 2: 0
SUM OF ALL RESPONSES TO PHQ9 QUESTIONS 1 & 2: 0
2. FEELING DOWN, DEPRESSED OR HOPELESS: NOT AT ALL
1. LITTLE INTEREST OR PLEASURE IN DOING THINGS: NOT AT ALL
SUM OF ALL RESPONSES TO PHQ QUESTIONS 1-9: 0

## 2024-02-29 DIAGNOSIS — I10 ESSENTIAL HYPERTENSION: ICD-10-CM

## 2024-02-29 DIAGNOSIS — E78.2 MIXED HYPERLIPIDEMIA: ICD-10-CM

## 2024-02-29 DIAGNOSIS — N18.30 STAGE 3 CHRONIC KIDNEY DISEASE, UNSPECIFIED WHETHER STAGE 3A OR 3B CKD (HCC): ICD-10-CM

## 2024-02-29 LAB
ALBUMIN SERPL-MCNC: 4.1 G/DL (ref 3.5–5.2)
ALBUMIN/GLOBULIN RATIO: 1.3 (ref 1–2.5)
ALP BLD-CCNC: 95 U/L (ref 35–104)
ALT SERPL-CCNC: 14 U/L (ref 5–33)
ANION GAP SERPL CALCULATED.3IONS-SCNC: 11 MMOL/L (ref 9–17)
AST SERPL-CCNC: 17 U/L
BILIRUB SERPL-MCNC: 0.5 MG/DL (ref 0.3–1.2)
BUN BLDV-MCNC: 14 MG/DL (ref 8–23)
CALCIUM SERPL-MCNC: 9.4 MG/DL (ref 8.6–10.4)
CHLORIDE BLD-SCNC: 107 MMOL/L (ref 98–107)
CHOLESTEROL/HDL RATIO: 3.1
CHOLESTEROL: 180 MG/DL
CO2: 25 MMOL/L (ref 20–31)
CREAT SERPL-MCNC: 1.1 MG/DL (ref 0.5–0.9)
GFR SERPL CREATININE-BSD FRML MDRD: 53 ML/MIN/1.73M2
GLUCOSE FASTING: 94 MG/DL (ref 70–99)
HDLC SERPL-MCNC: 58 MG/DL
LDL CHOLESTEROL: 90 MG/DL (ref 0–130)
POTASSIUM SERPL-SCNC: 4.7 MMOL/L (ref 3.7–5.3)
SODIUM BLD-SCNC: 143 MMOL/L (ref 135–144)
TOTAL PROTEIN: 7.2 G/DL (ref 6.4–8.3)
TRIGL SERPL-MCNC: 162 MG/DL

## 2024-03-01 ENCOUNTER — OFFICE VISIT (OUTPATIENT)
Dept: PRIMARY CARE CLINIC | Age: 74
End: 2024-03-01

## 2024-03-01 VITALS
BODY MASS INDEX: 36.84 KG/M2 | WEIGHT: 215.8 LBS | HEART RATE: 84 BPM | HEIGHT: 64 IN | DIASTOLIC BLOOD PRESSURE: 82 MMHG | OXYGEN SATURATION: 96 % | SYSTOLIC BLOOD PRESSURE: 128 MMHG

## 2024-03-01 DIAGNOSIS — I10 ESSENTIAL HYPERTENSION: Primary | ICD-10-CM

## 2024-03-01 DIAGNOSIS — E78.2 MIXED HYPERLIPIDEMIA: ICD-10-CM

## 2024-03-01 DIAGNOSIS — N18.30 STAGE 3 CHRONIC KIDNEY DISEASE, UNSPECIFIED WHETHER STAGE 3A OR 3B CKD (HCC): ICD-10-CM

## 2024-03-01 SDOH — HEALTH STABILITY: PHYSICAL HEALTH: ON AVERAGE, HOW MANY MINUTES DO YOU ENGAGE IN EXERCISE AT THIS LEVEL?: 60 MIN

## 2024-03-01 SDOH — HEALTH STABILITY: PHYSICAL HEALTH: ON AVERAGE, HOW MANY DAYS PER WEEK DO YOU ENGAGE IN MODERATE TO STRENUOUS EXERCISE (LIKE A BRISK WALK)?: 2 DAYS

## 2024-03-01 ASSESSMENT — ENCOUNTER SYMPTOMS: RESPIRATORY NEGATIVE: 1

## 2024-03-01 NOTE — PROGRESS NOTES
Take by mouth      Cholecalciferol (VITAMIN D3) 50 MCG (2000 UT) CAPS Take by mouth 2 tablets daily      Probiotic Product (PROBIOTIC-10 PO) Take by mouth      Omega-3 Fatty Acids (FISH OIL) 1000 MG CAPS Take 3 capsules by mouth daily      aspirin 81 MG EC tablet Take 1 tablet by mouth daily      Calcium Citrate-Vitamin D (CALCIUM CITRATE + D PO) Take 2 tablets by mouth daily       Calcium Polycarbophil (FIBER-CAPS PO) Take 5 capsules by mouth daily      esomeprazole (NEXIUM) 20 MG delayed release capsule Take 2 capsules by mouth daily 60 capsule 3    Multiple Vitamin TABS Take 1 tablet by mouth daily      Ascorbic Acid (VITAMIN C) 500 MG tablet Take 1 tablet by mouth daily Indications: take as directed      Vitamin E 400 UNITS TABS Take 400 Units by mouth Twice a Week Indications: take as directed        No current facility-administered medications for this visit.     Allergies   Allergen Reactions    Ciprofloxacin Other (See Comments)     Patient relates only IV    Codeine     Lisinopril     Morphine     Norvasc [Amlodipine Besylate]     Penicillins     Sanctura [Trospium] Nausea Only     Bloated, nauseated    Xarelto [Rivaroxaban]     Macrobid [Nitrofurantoin] Other (See Comments)     headaches       Health Maintenance   Topic Date Due    Annual Wellness Visit (Medicare)  04/26/2024    Lipids  02/28/2025    Depression Screen  02/28/2025    GFR test (Diabetes, CKD 3-4, OR last GFR 15-59)  02/28/2025    Breast cancer screen  03/31/2025    DTaP/Tdap/Td vaccine (2 - Td or Tdap) 05/02/2026    Colorectal Cancer Screen  12/13/2031    DEXA (modify frequency per FRAX score)  Completed    Flu vaccine  Completed    Shingles vaccine  Completed    Pneumococcal 65+ years Vaccine  Completed    COVID-19 Vaccine  Completed    Respiratory Syncytial Virus (RSV) Pregnant or age 60 yrs+  Completed    Hepatitis C screen  Completed    Hepatitis A vaccine  Aged Out    Hepatitis B vaccine  Aged Out    Hib vaccine  Aged Out    Polio

## 2024-04-10 ENCOUNTER — OFFICE VISIT (OUTPATIENT)
Dept: PRIMARY CARE CLINIC | Age: 74
End: 2024-04-10
Payer: MEDICARE

## 2024-04-10 VITALS
OXYGEN SATURATION: 95 % | WEIGHT: 218.6 LBS | BODY MASS INDEX: 40.23 KG/M2 | DIASTOLIC BLOOD PRESSURE: 80 MMHG | SYSTOLIC BLOOD PRESSURE: 110 MMHG | HEIGHT: 62 IN | HEART RATE: 69 BPM

## 2024-04-10 DIAGNOSIS — M51.9 LUMBAR DISC DISEASE: ICD-10-CM

## 2024-04-10 DIAGNOSIS — M54.16 LUMBAR RADICULOPATHY: Primary | ICD-10-CM

## 2024-04-10 PROCEDURE — 3074F SYST BP LT 130 MM HG: CPT | Performed by: FAMILY MEDICINE

## 2024-04-10 PROCEDURE — G8399 PT W/DXA RESULTS DOCUMENT: HCPCS | Performed by: FAMILY MEDICINE

## 2024-04-10 PROCEDURE — 1123F ACP DISCUSS/DSCN MKR DOCD: CPT | Performed by: FAMILY MEDICINE

## 2024-04-10 PROCEDURE — 1036F TOBACCO NON-USER: CPT | Performed by: FAMILY MEDICINE

## 2024-04-10 PROCEDURE — G8417 CALC BMI ABV UP PARAM F/U: HCPCS | Performed by: FAMILY MEDICINE

## 2024-04-10 PROCEDURE — 1090F PRES/ABSN URINE INCON ASSESS: CPT | Performed by: FAMILY MEDICINE

## 2024-04-10 PROCEDURE — 3017F COLORECTAL CA SCREEN DOC REV: CPT | Performed by: FAMILY MEDICINE

## 2024-04-10 PROCEDURE — 3079F DIAST BP 80-89 MM HG: CPT | Performed by: FAMILY MEDICINE

## 2024-04-10 PROCEDURE — G8427 DOCREV CUR MEDS BY ELIG CLIN: HCPCS | Performed by: FAMILY MEDICINE

## 2024-04-10 PROCEDURE — 99213 OFFICE O/P EST LOW 20 MIN: CPT | Performed by: FAMILY MEDICINE

## 2024-04-10 RX ORDER — IBUPROFEN 800 MG/1
800 TABLET ORAL
Qty: 90 TABLET | Refills: 0 | Status: SHIPPED | OUTPATIENT
Start: 2024-04-10

## 2024-04-10 SDOH — ECONOMIC STABILITY: INCOME INSECURITY: HOW HARD IS IT FOR YOU TO PAY FOR THE VERY BASICS LIKE FOOD, HOUSING, MEDICAL CARE, AND HEATING?: NOT HARD AT ALL

## 2024-04-10 SDOH — ECONOMIC STABILITY: FOOD INSECURITY: WITHIN THE PAST 12 MONTHS, THE FOOD YOU BOUGHT JUST DIDN'T LAST AND YOU DIDN'T HAVE MONEY TO GET MORE.: NEVER TRUE

## 2024-04-10 SDOH — ECONOMIC STABILITY: FOOD INSECURITY: WITHIN THE PAST 12 MONTHS, YOU WORRIED THAT YOUR FOOD WOULD RUN OUT BEFORE YOU GOT MONEY TO BUY MORE.: NEVER TRUE

## 2024-04-10 NOTE — PROGRESS NOTES
Muna Lomax is a 73 y.o. femalewho presents today for her medical conditions/complaints as noted below.   Chief Complaint   Patient presents with    Back Pain     Ongoing for 2+ weeks. Pt states its a constant pain in her butt and RT leg and when she does something it is shooting pain. Pt states she taken ibprophen and dulls the pain. Pt states she did try excises with no relief.          HPI:     HPI  Pain in the right buttock, lower back and radiates down right leg to foot.   Pain, numbness, tingling down the whole leg: lateral thigh and right calf    Walking and standing worse. Laying down helps  Ice and motrin helps 600 mg BID    Previous lumbar x-ray reviewed  Current Outpatient Medications   Medication Sig Dispense Refill    ibuprofen (ADVIL;MOTRIN) 800 MG tablet Take 1 tablet by mouth 3 times daily (with meals) 90 tablet 0    clindamycin (CLEOCIN) 300 MG capsule Patient will take 6 prior to dentist appointment next week.      Magnesium Citrate 125 MG CAPS       carvedilol (COREG) 3.125 MG tablet Take 1 tablet by mouth 2 times daily 60 tablet 3    simvastatin (ZOCOR) 20 MG tablet TAKE ONE TABLET BY MOUTH DAILY 90 tablet 3    cloNIDine (CATAPRES) 0.3 MG tablet TAKE ONE TABLET BY MOUTH ONCE NIGHTLY 90 tablet 3    spironolactone (ALDACTONE) 50 MG tablet TAKE ONE TABLET BY MOUTH DAILY WITH LUNCH 90 tablet 2    Cranberry 200 MG CAPS Take by mouth      Handicap Placard MISC by Does not apply route For orthopedic issues, expires 9/16/2027 1 each 0    triamcinolone (KENALOG) 0.1 % ointment       Coenzyme Q10 (CO Q 10) 100 MG CAPS Take by mouth      Cholecalciferol (VITAMIN D3) 50 MCG (2000 UT) CAPS Take by mouth 2 tablets daily      Probiotic Product (PROBIOTIC-10 PO) Take by mouth      Omega-3 Fatty Acids (FISH OIL) 1000 MG CAPS Take 3 capsules by mouth daily      aspirin 81 MG EC tablet Take 1 tablet by mouth daily      Calcium Citrate-Vitamin D (CALCIUM CITRATE + D PO) Take 2 tablets by mouth daily

## 2024-04-12 ENCOUNTER — HOSPITAL ENCOUNTER (OUTPATIENT)
Age: 74
End: 2024-04-12
Payer: MEDICARE

## 2024-04-12 ENCOUNTER — HOSPITAL ENCOUNTER (OUTPATIENT)
Dept: GENERAL RADIOLOGY | Age: 74
End: 2024-04-12
Payer: MEDICARE

## 2024-04-12 DIAGNOSIS — M54.16 LUMBAR RADICULOPATHY: ICD-10-CM

## 2024-04-12 DIAGNOSIS — M51.9 LUMBAR DISC DISEASE: ICD-10-CM

## 2024-04-12 PROCEDURE — 72110 X-RAY EXAM L-2 SPINE 4/>VWS: CPT

## 2024-04-25 ENCOUNTER — OFFICE VISIT (OUTPATIENT)
Dept: PODIATRY | Age: 74
End: 2024-04-25
Payer: MEDICARE

## 2024-04-25 VITALS — WEIGHT: 218 LBS | HEIGHT: 62 IN | BODY MASS INDEX: 40.12 KG/M2

## 2024-04-25 DIAGNOSIS — B35.1 ONYCHOMYCOSIS OF TOENAIL: Primary | ICD-10-CM

## 2024-04-25 DIAGNOSIS — M79.674 PAIN OF TOES OF BOTH FEET: ICD-10-CM

## 2024-04-25 DIAGNOSIS — M79.675 PAIN OF TOES OF BOTH FEET: ICD-10-CM

## 2024-04-25 PROCEDURE — 11721 DEBRIDE NAIL 6 OR MORE: CPT | Performed by: PODIATRIST

## 2024-04-25 PROCEDURE — 99999 PR OFFICE/OUTPT VISIT,PROCEDURE ONLY: CPT | Performed by: PODIATRIST

## 2024-04-29 ASSESSMENT — ENCOUNTER SYMPTOMS
BACK PAIN: 0
NAUSEA: 0
SHORTNESS OF BREATH: 0
DIARRHEA: 0
COLOR CHANGE: 0

## 2024-04-29 NOTE — PROGRESS NOTES
[] pigmentary changes (discoloration)   ·         [] skin texture (thin, shiny)   ·         [] skin color (rubor or redness)   [] Q8.      Class C Findings (1 Class B, 2 Class C needed)   1. [] Claudication   2. [] Temperature changes   3. [] Edema   4. [] Paresthesia   5. [] Burning   [] Q9.     NO CLASS FINDINGS ARE NOTED    ASSESSMENT:    Diagnosis Orders   1. Onychomycosis of toenail  SD DEBRIDEMENT NAIL ANY METHOD 6/>      2. Pain of toes of both feet  SD DEBRIDEMENT NAIL ANY METHOD 6/>        PLAN: Toenails 1,2,3,4,5 of the right foot and 1,2,3,4,5 of the left foot were debrided in length and thickness using a nail nipper and a . Return in about 9 weeks (around 6/27/2024) for Painful fungal nails.   4/25/2024      Angel Luis Caal DPM

## 2024-05-10 ENCOUNTER — OFFICE VISIT (OUTPATIENT)
Dept: PRIMARY CARE CLINIC | Age: 74
End: 2024-05-10

## 2024-05-10 ENCOUNTER — TELEPHONE (OUTPATIENT)
Dept: PRIMARY CARE CLINIC | Age: 74
End: 2024-05-10

## 2024-05-10 VITALS
OXYGEN SATURATION: 97 % | HEIGHT: 62 IN | SYSTOLIC BLOOD PRESSURE: 140 MMHG | WEIGHT: 219 LBS | DIASTOLIC BLOOD PRESSURE: 88 MMHG | BODY MASS INDEX: 40.3 KG/M2 | HEART RATE: 87 BPM

## 2024-05-10 DIAGNOSIS — I10 ESSENTIAL HYPERTENSION: ICD-10-CM

## 2024-05-10 DIAGNOSIS — M54.16 LUMBAR RADICULOPATHY: Primary | ICD-10-CM

## 2024-05-10 ASSESSMENT — ENCOUNTER SYMPTOMS
NAUSEA: 0
SHORTNESS OF BREATH: 0
VOMITING: 0
BACK PAIN: 1
ABDOMINAL PAIN: 0

## 2024-05-10 NOTE — PROGRESS NOTES
(PROBIOTIC-10 PO) Take by mouth      Omega-3 Fatty Acids (FISH OIL) 1000 MG CAPS Take 3 capsules by mouth daily      aspirin 81 MG EC tablet Take 1 tablet by mouth daily      Calcium Citrate-Vitamin D (CALCIUM CITRATE + D PO) Take 2 tablets by mouth daily       Calcium Polycarbophil (FIBER-CAPS PO) Take 5 capsules by mouth daily      esomeprazole (NEXIUM) 20 MG delayed release capsule Take 2 capsules by mouth daily 60 capsule 3    Multiple Vitamin TABS Take 1 tablet by mouth daily      Ascorbic Acid (VITAMIN C) 500 MG tablet Take 1 tablet by mouth daily Indications: take as directed      Vitamin E 400 UNITS TABS Take 400 Units by mouth Twice a Week Indications: take as directed       clindamycin (CLEOCIN) 300 MG capsule Patient will take 6 prior to dentist appointment next week. (Patient not taking: Reported on 5/10/2024)      Handicap Placard MISC by Does not apply route For orthopedic issues, expires 9/16/2027 1 each 0     No current facility-administered medications for this visit.     Allergies   Allergen Reactions    Ciprofloxacin Other (See Comments)     Patient relates only IV    Codeine     Lisinopril     Morphine     Norvasc [Amlodipine Besylate]     Penicillins     Sanctura [Trospium] Nausea Only     Bloated, nauseated    Xarelto [Rivaroxaban]     Macrobid [Nitrofurantoin] Other (See Comments)     headaches       Health Maintenance   Topic Date Due    Annual Wellness Visit (Medicare)  04/26/2024    Lipids  02/28/2025    Depression Screen  02/28/2025    GFR test (Diabetes, CKD 3-4, OR last GFR 15-59)  02/28/2025    Breast cancer screen  03/31/2025    DTaP/Tdap/Td vaccine (2 - Td or Tdap) 05/02/2026    Colorectal Cancer Screen  12/13/2031    DEXA (modify frequency per FRAX score)  Completed    Flu vaccine  Completed    Shingles vaccine  Completed    Pneumococcal 65+ years Vaccine  Completed    COVID-19 Vaccine  Completed    Respiratory Syncytial Virus (RSV) Pregnant or age 60 yrs+  Completed    Hepatitis C

## 2024-05-10 NOTE — TELEPHONE ENCOUNTER
Per [facility] scheduling, patient is scheduled for an MRI lumbar spine with sedation however the only sedation they do for adults is with anaesthesia which patient does not want.    Scheduling is asking for the order to be changed to remove the sedation requirement. Patient stated she would reach out to the office regarding a short term medication to help with anxiety.

## 2024-05-14 RX ORDER — CARVEDILOL 3.12 MG/1
3.12 TABLET ORAL 2 TIMES DAILY
Qty: 180 TABLET | Refills: 0 | Status: SHIPPED | OUTPATIENT
Start: 2024-05-14

## 2024-05-17 ENCOUNTER — HOSPITAL ENCOUNTER (OUTPATIENT)
Dept: MRI IMAGING | Age: 74
End: 2024-05-17
Attending: STUDENT IN AN ORGANIZED HEALTH CARE EDUCATION/TRAINING PROGRAM
Payer: MEDICARE

## 2024-05-17 DIAGNOSIS — M54.16 LUMBAR RADICULOPATHY: ICD-10-CM

## 2024-05-17 PROCEDURE — 72148 MRI LUMBAR SPINE W/O DYE: CPT

## 2024-05-20 ENCOUNTER — TELEPHONE (OUTPATIENT)
Dept: PRIMARY CARE CLINIC | Age: 74
End: 2024-05-20

## 2024-05-20 DIAGNOSIS — M51.9 LUMBAR DISC DISEASE: Primary | ICD-10-CM

## 2024-05-20 NOTE — TELEPHONE ENCOUNTER
----- Message from Renan Arthur sent at 5/20/2024 12:02 PM EDT -----  Regarding: [facility] Referral Request  [facility] Referral Request    Reason for referral request: Specialty Provider    Specialist/Lab/Test patient is requesting (if known): Dr. Zackery Herring    Specialist Phone Number (if applicable):    Additional Information: Patient wants Dr. Zackery Herring  --------------------------------------------------------------------------------------------------------------------------    Relationship to Patient: Self     Call Back Information: OK to leave message on voicemail  Preferred Call Back Number: Phone: [phone]

## 2024-05-21 ENCOUNTER — TELEPHONE (OUTPATIENT)
Dept: PRIMARY CARE CLINIC | Age: 74
End: 2024-05-21

## 2024-05-21 DIAGNOSIS — M51.9 LUMBAR DISC DISEASE: Primary | ICD-10-CM

## 2024-05-21 NOTE — TELEPHONE ENCOUNTER
Arron with [facility] [employer] calling. They received a referral for pt and they do not treat for Lumbar Disc. Pt will need to be referred somewhere else. KISHA Heller-[phone]

## 2024-05-30 NOTE — PROGRESS NOTES
accurately reflects the content of the visit, there is no guarantee every grammatical, syntax, or spelling error has been or will be identified or corrected.  Additionally, system limitations of the EMR beyond the control of the practitioner may cause unintentional errors or omissions not identified or corrected at the time of record finalization and signature.    Multiple records reviewed. All questions were addressed to the patient's satisfaction.    Follow up:Return in about 1 week (around 6/10/2024) for pelvic u/s.     Electronically signed by YARA Kruger CNP on 6/3/2024 at 12:53 PM

## 2024-06-01 SDOH — HEALTH STABILITY: PHYSICAL HEALTH: ON AVERAGE, HOW MANY DAYS PER WEEK DO YOU ENGAGE IN MODERATE TO STRENUOUS EXERCISE (LIKE A BRISK WALK)?: 0 DAYS

## 2024-06-03 ENCOUNTER — HOSPITAL ENCOUNTER (OUTPATIENT)
Age: 74
Setting detail: SPECIMEN
Discharge: HOME | End: 2024-06-03

## 2024-06-03 ENCOUNTER — OFFICE VISIT (OUTPATIENT)
Age: 74
End: 2024-06-03
Payer: MEDICARE

## 2024-06-03 VITALS
HEART RATE: 72 BPM | BODY MASS INDEX: 38.59 KG/M2 | TEMPERATURE: 97.5 F | WEIGHT: 211 LBS | SYSTOLIC BLOOD PRESSURE: 126 MMHG | DIASTOLIC BLOOD PRESSURE: 82 MMHG

## 2024-06-03 DIAGNOSIS — Z12.31 ENCOUNTER FOR SCREENING MAMMOGRAM FOR MALIGNANT NEOPLASM OF BREAST: ICD-10-CM

## 2024-06-03 DIAGNOSIS — N95.2 ATROPHIC VULVOVAGINITIS: ICD-10-CM

## 2024-06-03 DIAGNOSIS — M54.50 LUMBAR BACK PAIN: ICD-10-CM

## 2024-06-03 DIAGNOSIS — N39.0 FREQUENT UTI: ICD-10-CM

## 2024-06-03 DIAGNOSIS — N90.4 LICHEN SCLEROSUS OF FEMALE GENITALIA: ICD-10-CM

## 2024-06-03 DIAGNOSIS — Z01.419 WELL FEMALE EXAM WITH ROUTINE GYNECOLOGICAL EXAM: Primary | ICD-10-CM

## 2024-06-03 DIAGNOSIS — N32.81 OAB (OVERACTIVE BLADDER): ICD-10-CM

## 2024-06-03 DIAGNOSIS — N95.0 PMB (POSTMENOPAUSAL BLEEDING): ICD-10-CM

## 2024-06-03 LAB
BILIRUBIN, POC: NORMAL
BLOOD URINE, POC: 50
CLARITY, POC: YELLOW
COLOR, POC: CLEAR
GLUCOSE URINE, POC: NORMAL
KETONES, POC: NORMAL
LEUKOCYTE EST, POC: 500
NITRITE, POC: NORMAL
PH, POC: 6
PROTEIN, POC: NORMAL
SPECIFIC GRAVITY, POC: 1.02
UROBILINOGEN, POC: NORMAL

## 2024-06-03 PROCEDURE — G8417 CALC BMI ABV UP PARAM F/U: HCPCS | Performed by: NURSE PRACTITIONER

## 2024-06-03 PROCEDURE — G8427 DOCREV CUR MEDS BY ELIG CLIN: HCPCS | Performed by: NURSE PRACTITIONER

## 2024-06-03 PROCEDURE — 81003 URINALYSIS AUTO W/O SCOPE: CPT | Performed by: NURSE PRACTITIONER

## 2024-06-03 PROCEDURE — G0101 CA SCREEN;PELVIC/BREAST EXAM: HCPCS | Performed by: NURSE PRACTITIONER

## 2024-06-03 RX ORDER — TRIAMCINOLONE ACETONIDE 1 MG/G
OINTMENT TOPICAL 2 TIMES DAILY
Qty: 30 G | Refills: 3 | Status: SHIPPED | OUTPATIENT
Start: 2024-06-03

## 2024-06-03 RX ORDER — GRANULES FOR ORAL 3 G/1
3 POWDER ORAL ONCE
Qty: 1 EACH | Refills: 0 | Status: SHIPPED | OUTPATIENT
Start: 2024-06-03 | End: 2024-06-03

## 2024-06-03 ASSESSMENT — ENCOUNTER SYMPTOMS: BACK PAIN: 1

## 2024-06-04 ENCOUNTER — OFFICE VISIT (OUTPATIENT)
Dept: ORTHOPEDIC SURGERY | Age: 74
End: 2024-06-04
Payer: MEDICARE

## 2024-06-04 ENCOUNTER — HOSPITAL ENCOUNTER (OUTPATIENT)
Dept: WOMENS IMAGING | Age: 74
Discharge: HOME | End: 2024-06-06
Payer: MEDICARE

## 2024-06-04 VITALS — HEIGHT: 62 IN | BODY MASS INDEX: 38.83 KG/M2 | WEIGHT: 210.98 LBS | RESPIRATION RATE: 14 BRPM

## 2024-06-04 DIAGNOSIS — Z12.31 ENCOUNTER FOR SCREENING MAMMOGRAM FOR MALIGNANT NEOPLASM OF BREAST: ICD-10-CM

## 2024-06-04 DIAGNOSIS — M43.16 SPONDYLOLISTHESIS OF LUMBAR REGION: Primary | ICD-10-CM

## 2024-06-04 DIAGNOSIS — M54.16 LUMBAR RADICULAR PAIN: ICD-10-CM

## 2024-06-04 DIAGNOSIS — M48.061 SPINAL STENOSIS OF LUMBAR REGION, UNSPECIFIED WHETHER NEUROGENIC CLAUDICATION PRESENT: ICD-10-CM

## 2024-06-04 PROCEDURE — G8417 CALC BMI ABV UP PARAM F/U: HCPCS | Performed by: ORTHOPAEDIC SURGERY

## 2024-06-04 PROCEDURE — 3017F COLORECTAL CA SCREEN DOC REV: CPT | Performed by: ORTHOPAEDIC SURGERY

## 2024-06-04 PROCEDURE — G8399 PT W/DXA RESULTS DOCUMENT: HCPCS | Performed by: ORTHOPAEDIC SURGERY

## 2024-06-04 PROCEDURE — 77063 BREAST TOMOSYNTHESIS BI: CPT

## 2024-06-04 PROCEDURE — 1036F TOBACCO NON-USER: CPT | Performed by: ORTHOPAEDIC SURGERY

## 2024-06-04 PROCEDURE — 1090F PRES/ABSN URINE INCON ASSESS: CPT | Performed by: ORTHOPAEDIC SURGERY

## 2024-06-04 PROCEDURE — G8427 DOCREV CUR MEDS BY ELIG CLIN: HCPCS | Performed by: ORTHOPAEDIC SURGERY

## 2024-06-04 PROCEDURE — 99214 OFFICE O/P EST MOD 30 MIN: CPT | Performed by: ORTHOPAEDIC SURGERY

## 2024-06-04 PROCEDURE — 1123F ACP DISCUSS/DSCN MKR DOCD: CPT | Performed by: ORTHOPAEDIC SURGERY

## 2024-06-04 NOTE — PROGRESS NOTES
Patient ID: Muna Lomax is a 73 y.o. female    Chief Compliant:  Chief Complaint   Patient presents with    Lower Back Pain        Diagnostic imaging:    Lateral flexion-extension x-rays L2-3 greater than L4-5 spondylolisthesis slightly increased on upright imaging    MRI lumbar spine is reviewed patient with advanced lumbar spondylosis multilevel lumbar spinal stenosis secondary to hypertrophic facet arthropathy predominantly greatest at L4-5 and L5-S1    Assessment and Plan:  1. Spondylolisthesis of lumbar region    2. Spinal stenosis of lumbar region, unspecified whether neurogenic claudication present    3. Lumbar radicular pain        Patient complains of chronic low back with right radicular leg pain with neurogenic claudication symptoms that has been ongoing for over 20 years.  Patient's biggest symptom right now is of L5 right radiculopathy    Patient is a candidate for L4 to sacrum PLIF although she is significantly warned that really at some point she is likely getting need an L2-4 add on    Failure of PT and pain management    Patient is a surgical candidate for an L4-S1 PLIF and is agreeable to surgical intervention at this time.     We discussed risks of surgery and recovery process. Risks include infection, bleeding, neurologic injury, systemic and anesthetic complications, no relief of pain, need for future surgery.      May consider an L2-4 PLIF in the future.     Off-jury duty note    Follow up 2 weeks post op    HPI:  This is a 73 y.o. female who presents to the clinic today as a new patient for low back evaluation.     Patient complains of chronic low back and right radicular leg pain with neurogenic claudication symptoms that has been ongoing for over 20 years.     She has received LESI's in the past and PT without any substantial relief.     Review of Systems   All other systems reviewed and are negative.      Past History:    Current Outpatient Medications:     triamcinolone (KENALOG) 0.1 %

## 2024-06-05 LAB
MICROORGANISM SPEC CULT: ABNORMAL
SERVICE CMNT-IMP: ABNORMAL
SPECIMEN DESCRIPTION: ABNORMAL

## 2024-06-07 DIAGNOSIS — I10 ESSENTIAL HYPERTENSION: ICD-10-CM

## 2024-06-07 RX ORDER — SPIRONOLACTONE 50 MG/1
TABLET, FILM COATED ORAL
Qty: 90 TABLET | Refills: 1 | Status: SHIPPED | OUTPATIENT
Start: 2024-06-07

## 2024-06-09 LAB — CYTOLOGY REPORT: NORMAL

## 2024-06-10 DIAGNOSIS — M54.16 LUMBAR RADICULOPATHY: ICD-10-CM

## 2024-06-11 RX ORDER — IBUPROFEN 800 MG/1
TABLET ORAL
Qty: 90 TABLET | Refills: 0 | Status: SHIPPED | OUTPATIENT
Start: 2024-06-11

## 2024-06-19 ENCOUNTER — TELEPHONE (OUTPATIENT)
Age: 74
End: 2024-06-19

## 2024-06-19 NOTE — TELEPHONE ENCOUNTER
Muna called.    Was last seen in office on 6/3/24 and from the visit a referral for a US Pelvic Complete was ordered.    She is having major back surgery on 7/24/24 and was asking if she can wait on the US Pelvic referral and do that once she has healed from her back surgery?    Please call her back with an answer. Phone number is correct.    Thank you.

## 2024-06-20 NOTE — TELEPHONE ENCOUNTER
Spoke to patient about waiting on the US Pelvic Complete, she promises she will get it done afterward her back surgery and is not yet experiencing any concerns but will call the office if she does.    @ 829AM 6/20/24

## 2024-07-05 RX ORDER — GRANULES FOR ORAL 3 G/1
POWDER ORAL
COMMUNITY
Start: 2024-06-12 | End: 2024-07-10

## 2024-07-09 NOTE — H&P (VIEW-ONLY)
HISTORY and PHYSICAL  [facility]       NAME:  Muna Lomax  MRN: [id number]   YOB: 1950   Date: 7/10/2024   Age: 73 y.o.  Gender: female     COMPLAINT AND PRESENT HISTORY:     Muna Lomax is 73 y.o., female, presents for pre-anesthesia/admission testing for Dx:  Spondylolisthesis of lumbar region [M43.16]  With scheduled   POSTERIOR DECOMPRESSION  INSTRUMENTED FUSION L4-S1 per Dr. Vallejo      Office note per Dr. Vallejo  on  6/04//24, italicized below.  HPI:  This is a 73 y.o. female who presents to the clinic today as a new patient for low back evaluation.   Patient complains of chronic low back and right radicular leg pain with neurogenic claudication symptoms that has been ongoing for over 20 years.   She has received LESI's in the past and PT without any substantial relief.     Above reviewed with patient and she concurs    UPDATE:     Patient is s/p : lower back and neck surgery  in 1983 & 1992    Patient has a long term history of low back pain.   Pt denies any prior injury to back   Pt has DDD,  pt continues to complains of pain in the lumbar spine area, radiates to the lower limbs on right side only.    The current  symptoms started 4 months ago.in May 2024.  The problem has been gradually worsening since onset. Due to un relentless sciatica.     Pt complains of pain, deformity, limitation in the range of motion.   Patient describes the pain as achy in lower back, sharp in buttocks and ankle and burning in the foot. She   rates the pain at 4/10  on average. She reports that more activity raises to 7/10.  Patient admits to paresthesia to right foot    Patient reports that  sitting in easy chair , prolonged walking , lifting laundry baskets.  aggravates sxs.      Patient has used ibuprofen sparingly , Tylenol daily, icing interventions to help with the pain. Pt completed 10-12 PT sessions more with LAKESHIA palacio in the past.      Patient has not been  using assistive device

## 2024-07-09 NOTE — H&P
Left Ear: External ear normal.      Nose: Nose normal.      Mouth/Throat:      Mouth: Mucous membranes are dry.      Pharynx: No posterior oropharyngeal erythema.   Eyes:      General:         Right eye: No discharge.         Left eye: No discharge.      Pupils: Pupils are equal, round, and reactive to light.      Comments: Wears glasses   Cardiovascular:      Rate and Rhythm: Normal rate and regular rhythm.      Heart sounds: Normal heart sounds.   Pulmonary:      Breath sounds: Normal breath sounds.   Abdominal:      General: Bowel sounds are normal.      Tenderness: There is no abdominal tenderness. There is no guarding.   Musculoskeletal:      Cervical back: Normal range of motion.      Comments: Patient unable to lift right leg fully and expressed some pain to her lower back with SLR.   Skin:     General: Skin is warm and dry.   Neurological:      Mental Status: She is alert and oriented to person, place, and time.   Psychiatric:         Mood and Affect: Mood normal.         LAB REVIEW     Lab Results   Component Value Date    WBC 7.4 07/10/2024    RBC 4.97 07/10/2024    HGB 14.4 07/10/2024    HCT 43.1 07/10/2024    MCV 86.7 07/10/2024    MCH 28.9 07/10/2024    MCHC 33.3 07/10/2024    RDW 13.6 07/10/2024     07/10/2024    MPV 7.8 07/10/2024        Lab Results   Component Value Date     07/10/2024    K 4.7 07/10/2024     07/10/2024    CO2 25 07/10/2024    BUN 16 07/10/2024    CREATININE 0.9 07/10/2024    GLUCOSE 93 07/10/2024    CALCIUM 9.4 07/10/2024    BILITOT 0.5 02/29/2024    ALKPHOS 95 02/29/2024    AST 17 02/29/2024    ALT 14 02/29/2024       PRELIMINARY EKG REVIEW, DATE: 6//18/24     Sinus rhythm with premature atrial complexes.  Anterior lateral infarct.    SURGERY / PROVISIONAL DIAGNOSES:      POSTERIOR DECOMPRESSION  INSTRUMENTED FUSION L4-S1    Spondylolisthesis of lumbar region [M43.16]    Patient Active Problem List    Diagnosis Date Noted    Chronic renal disease, stage III  ([facility]) [431601] 09/16/2022    Severe obesity (BMI 35.0-39.9) with comorbidity ([facility]) 04/26/2023    Chronic vulvitis 03/14/2022    Overactive bladder 03/14/2022    Postmenopausal atrophic vaginitis 03/14/2022    Tubular adenoma of colon 12/14/2021    BMI 36.0-36.9,adult 02/25/2020    Acute internal derangement of right knee 08/06/2018    Asthma 11/06/2015    Cervical disc disease 11/06/2015    Esophageal reflux 11/06/2015    Fatigue 11/06/2015    Hip joint stiffness 11/06/2015    Hyperlipidemia 11/06/2015    Essential hypertension 11/06/2015    Hypokalemia 11/06/2015    Joint pain, hip 11/06/2015    Lower back pain 11/06/2015    Muscle spasm 11/06/2015    Nasal congestion 11/06/2015    Neck pain 11/06/2015    Osteoarthritis of hip 11/06/2015    Osteoarthritis of neck 11/06/2015    Sacroiliac strain 11/06/2015    Scoliosis 11/06/2015    Shortness of breath 11/06/2015    Snoring 11/06/2015    Tachycardia 11/06/2015    Urinary incontinence 11/06/2015    Weight gain 11/06/2015           Preliminary EKG, CBC, BMP completed today has been previewed per writer    CLEARANCE:     Patient has cardiac clearance on chart and has seen her primary care physician recently.  Based on my personal evaluation of patient including review of patient's chart, no clearance requested  for scheduled surgery.      Total time spent on encounter- PAT provider minutes: 21-30 minutes     YARA NATHAN - CNP on 7/10/2024 at 1:17 PM

## 2024-07-10 ENCOUNTER — HOSPITAL ENCOUNTER (OUTPATIENT)
Dept: PREADMISSION TESTING | Age: 74
Discharge: HOME | End: 2024-07-10
Attending: ORTHOPAEDIC SURGERY | Admitting: ORTHOPAEDIC SURGERY
Payer: MEDICARE

## 2024-07-10 VITALS
RESPIRATION RATE: 18 BRPM | SYSTOLIC BLOOD PRESSURE: 132 MMHG | OXYGEN SATURATION: 95 % | BODY MASS INDEX: 33.99 KG/M2 | TEMPERATURE: 97.9 F | WEIGHT: 204 LBS | DIASTOLIC BLOOD PRESSURE: 75 MMHG | HEIGHT: 65 IN | HEART RATE: 79 BPM

## 2024-07-10 LAB
ANION GAP SERPL CALCULATED.3IONS-SCNC: 12 MMOL/L (ref 9–17)
BASOPHILS # BLD: 0 K/UL (ref 0–0.2)
BASOPHILS NFR BLD: 1 % (ref 0–2)
BUN SERPL-MCNC: 16 MG/DL (ref 8–23)
CALCIUM SERPL-MCNC: 9.4 MG/DL (ref 8.6–10.4)
CHLORIDE SERPL-SCNC: 104 MMOL/L (ref 98–107)
CO2 SERPL-SCNC: 25 MMOL/L (ref 20–31)
CREAT SERPL-MCNC: 0.9 MG/DL (ref 0.5–0.9)
EOSINOPHIL # BLD: 0.1 K/UL (ref 0–0.4)
EOSINOPHILS RELATIVE PERCENT: 2 % (ref 0–4)
ERYTHROCYTE [DISTWIDTH] IN BLOOD BY AUTOMATED COUNT: 13.6 % (ref 11.5–14.9)
GFR, ESTIMATED: 68 ML/MIN/1.73M2
GLUCOSE SERPL-MCNC: 93 MG/DL (ref 70–99)
HCT VFR BLD AUTO: 43.1 % (ref 36–46)
HGB BLD-MCNC: 14.4 G/DL (ref 12–16)
LYMPHOCYTES NFR BLD: 1.6 K/UL (ref 1–4.8)
LYMPHOCYTES RELATIVE PERCENT: 21 % (ref 24–44)
MCH RBC QN AUTO: 28.9 PG (ref 26–34)
MCHC RBC AUTO-ENTMCNC: 33.3 G/DL (ref 31–37)
MCV RBC AUTO: 86.7 FL (ref 80–100)
MONOCYTES NFR BLD: 0.6 K/UL (ref 0.1–1.3)
MONOCYTES NFR BLD: 8 % (ref 1–7)
NEUTROPHILS NFR BLD: 68 % (ref 36–66)
NEUTS SEG NFR BLD: 5 K/UL (ref 1.3–9.1)
PLATELET # BLD AUTO: 242 K/UL (ref 150–450)
PMV BLD AUTO: 7.8 FL (ref 6–12)
POTASSIUM SERPL-SCNC: 4.7 MMOL/L (ref 3.7–5.3)
RBC # BLD AUTO: 4.97 M/UL (ref 4–5.2)
SODIUM SERPL-SCNC: 141 MMOL/L (ref 135–144)
WBC OTHER # BLD: 7.4 K/UL (ref 3.5–11)

## 2024-07-10 PROCEDURE — 80048 BASIC METABOLIC PNL TOTAL CA: CPT

## 2024-07-10 PROCEDURE — 36415 COLL VENOUS BLD VENIPUNCTURE: CPT

## 2024-07-10 PROCEDURE — APPSS45 APP SPLIT SHARED TIME 31-45 MINUTES: Performed by: NURSE PRACTITIONER

## 2024-07-10 PROCEDURE — 85025 COMPLETE CBC W/AUTO DIFF WBC: CPT

## 2024-07-10 RX ORDER — SENNOSIDES A AND B 8.6 MG/1
1 TABLET, FILM COATED ORAL DAILY PRN
COMMUNITY

## 2024-07-10 ASSESSMENT — ENCOUNTER SYMPTOMS
ABDOMINAL PAIN: 0
NAUSEA: 0
SINUS PRESSURE: 0
SHORTNESS OF BREATH: 0

## 2024-07-10 NOTE — DISCHARGE INSTRUCTIONS
Pre-op Instructions For Patient Being Admitted After Surgery    Medication Instructions:  Please stop herbs and any supplements now (includes vitamins and minerals).    Please contact your surgeon and prescribing physician for pre-op instructions for any blood thinners. Stop Aspirin & Ibuprofen as directed    If you have inhalers/aerosol treatments at home, please use them the morning of your surgery and bring the inhalers with you to the hospital.    Please take the following medications the morning of your surgery with a sip of water:    Carvedilol & Esomeprazole    Surgery Instructions:  After midnight before surgery:  Do not eat or drink anything, including water, mints, gum, and hard candy.  You may brush your teeth without swallowing.  No smoking, chewing tobacco, or street drugs.    Please shower or bathe before surgery.  If you were given Surgical Scrub Chlorhexidine Gluconate Liquid (CHG), please shower the night before and the morning of your surgery following the detailed instructions you received during your pre-admission visit.     Please do not wear any cologne, lotion, powder, deodorant, jewelry, piercings, perfume, makeup, nail polish, hair accessories, or hair spray on the day of surgery.  Wear loose comfortable clothing.    Leave your valuables at home.  Bring a storage case for any glasses/contacts.    The Day of Surgery:  Arrive at [facility] Surgery Entrance at the time directed by your surgeon and check in at the desk.    If you have a living will or healthcare power of , please bring a copy.    You will be taken to the pre-op holding area where you will be prepared for surgery.  A physical assessment will be performed by a nurse practitioner or house officer.  Your IV will be started and you will meet your anesthesiologist.    When you go to surgery, your family will be directed to the surgical waiting room, where the doctor should speak with

## 2024-07-10 NOTE — PROGRESS NOTES
Patient has bladder stimulator, instructed patient stimulator will need turned off prior to surgery, verbalizes understanding.

## 2024-07-15 ENCOUNTER — TELEPHONE (OUTPATIENT)
Dept: PRIMARY CARE CLINIC | Age: 74
End: 2024-07-15

## 2024-07-15 DIAGNOSIS — D72.819 LEUKOPENIA, UNSPECIFIED TYPE: Primary | ICD-10-CM

## 2024-07-15 NOTE — TELEPHONE ENCOUNTER
Patient states a week from Wednesday she is getting surgery and the anesthesiologist told her white blood count was low but they weren't worried about it. Patient states she would like to do a urine test to make sure doesn't have a a UTI before she gets surgery.

## 2024-07-16 ENCOUNTER — HOSPITAL ENCOUNTER (OUTPATIENT)
Age: 74
Setting detail: SPECIMEN
Discharge: HOME | End: 2024-07-16

## 2024-07-16 DIAGNOSIS — D72.819 LEUKOPENIA, UNSPECIFIED TYPE: ICD-10-CM

## 2024-07-16 DIAGNOSIS — R30.0 DYSURIA: Primary | ICD-10-CM

## 2024-07-16 DIAGNOSIS — R30.0 DYSURIA: ICD-10-CM

## 2024-07-16 LAB
BACTERIA URNS QL MICRO: NORMAL
BILIRUB UR QL STRIP: NEGATIVE
CASTS #/AREA URNS LPF: NORMAL /LPF (ref 0–8)
CLARITY UR: CLEAR
COLOR UR: YELLOW
EPI CELLS #/AREA URNS HPF: NORMAL /HPF (ref 0–5)
GLUCOSE UR STRIP-MCNC: NEGATIVE MG/DL
HGB UR QL STRIP.AUTO: NEGATIVE
KETONES UR STRIP-MCNC: NEGATIVE MG/DL
LEUKOCYTE ESTERASE UR QL STRIP: NEGATIVE
NITRITE UR QL STRIP: NEGATIVE
PH UR STRIP: 6 [PH] (ref 5–8)
PROT UR STRIP-MCNC: NEGATIVE MG/DL
RBC #/AREA URNS HPF: NORMAL /HPF (ref 0–4)
SP GR UR STRIP: 1.02 (ref 1–1.03)
UROBILINOGEN UR STRIP-ACNC: NORMAL EU/DL (ref 0–1)
WBC #/AREA URNS HPF: NORMAL /HPF (ref 0–5)

## 2024-07-17 LAB
MICROORGANISM SPEC CULT: NO GROWTH
SERVICE CMNT-IMP: NORMAL
SPECIMEN DESCRIPTION: NORMAL

## 2024-07-23 ENCOUNTER — ANESTHESIA EVENT (OUTPATIENT)
Dept: OPERATING ROOM | Age: 74
End: 2024-07-23
Payer: MEDICARE

## 2024-07-23 NOTE — PRE-PROCEDURE INSTRUCTIONS
Nothing to eat after midnight.  Are you taking any blood thinners? When was the last day?  Make sure to use Hibiclens prior to surgery.  Remove any jewelry and body piercings.  Do you wear glasses? If so, please bring a case to store them in.  Are you having any Covid symptoms?  Do you have any new rashes, infections, etc. that we should be aware of?  Do you have a ride home the day of surgery? It cannot be a cab or medical transportation.  Verify surgery time and what time to arrive at hospital.   Left message regarding arrival time, procedure time, npo status after midnight, bathe with hibiclens soap, need for , pre op phone number for any questions.

## 2024-07-24 ENCOUNTER — ANESTHESIA (OUTPATIENT)
Dept: OPERATING ROOM | Age: 74
End: 2024-07-24
Payer: MEDICARE

## 2024-07-24 ENCOUNTER — APPOINTMENT (OUTPATIENT)
Dept: GENERAL RADIOLOGY | Age: 74
End: 2024-07-24
Attending: ORTHOPAEDIC SURGERY
Payer: MEDICARE

## 2024-07-24 ENCOUNTER — HOSPITAL ENCOUNTER (INPATIENT)
Age: 74
LOS: 3 days | Discharge: HOME | End: 2024-07-27
Attending: ORTHOPAEDIC SURGERY | Admitting: ORTHOPAEDIC SURGERY
Payer: MEDICARE

## 2024-07-24 DIAGNOSIS — M48.062 SPINAL STENOSIS OF LUMBAR REGION WITH NEUROGENIC CLAUDICATION: Primary | ICD-10-CM

## 2024-07-24 PROBLEM — M43.10 ACQUIRED SPONDYLOLISTHESIS: Status: ACTIVE | Noted: 2024-07-24

## 2024-07-24 PROCEDURE — 22842 INSERT SPINE FIXATION DEVICE: CPT | Performed by: PHYSICIAN ASSISTANT

## 2024-07-24 PROCEDURE — C1889 IMPLANT/INSERT DEVICE, NOC: HCPCS | Performed by: ORTHOPAEDIC SURGERY

## 2024-07-24 PROCEDURE — 2580000003 HC RX 258: Performed by: ANESTHESIOLOGY

## 2024-07-24 PROCEDURE — 3700000000 HC ANESTHESIA ATTENDED CARE: Performed by: ORTHOPAEDIC SURGERY

## 2024-07-24 PROCEDURE — 22633 ARTHRD CMBN 1NTRSPC LUMBAR: CPT | Performed by: ORTHOPAEDIC SURGERY

## 2024-07-24 PROCEDURE — 3600000013 HC SURGERY LEVEL 3 ADDTL 15MIN: Performed by: ORTHOPAEDIC SURGERY

## 2024-07-24 PROCEDURE — 3600000003 HC SURGERY LEVEL 3 BASE: Performed by: ORTHOPAEDIC SURGERY

## 2024-07-24 PROCEDURE — 01NR0ZZ RELEASE SACRAL NERVE, OPEN APPROACH: ICD-10-PCS | Performed by: ORTHOPAEDIC SURGERY

## 2024-07-24 PROCEDURE — 22634 ARTHRD CMBN 1NTRSPC EA ADDL: CPT | Performed by: PHYSICIAN ASSISTANT

## 2024-07-24 PROCEDURE — 0SG00AJ FUSION OF LUMBAR VERTEBRAL JOINT WITH INTERBODY FUSION DEVICE, POSTERIOR APPROACH, ANTERIOR COLUMN, OPEN APPROACH: ICD-10-PCS | Performed by: ORTHOPAEDIC SURGERY

## 2024-07-24 PROCEDURE — 2500000003 HC RX 250 WO HCPCS: Performed by: ORTHOPAEDIC SURGERY

## 2024-07-24 PROCEDURE — 63052 LAM FACETC/FRMT ARTHRD LUM 1: CPT | Performed by: ORTHOPAEDIC SURGERY

## 2024-07-24 PROCEDURE — 6370000000 HC RX 637 (ALT 250 FOR IP): Performed by: ANESTHESIOLOGY

## 2024-07-24 PROCEDURE — 22853 INSJ BIOMECHANICAL DEVICE: CPT | Performed by: PHYSICIAN ASSISTANT

## 2024-07-24 PROCEDURE — 22842 INSERT SPINE FIXATION DEVICE: CPT | Performed by: ORTHOPAEDIC SURGERY

## 2024-07-24 PROCEDURE — 0SG3071 FUSION OF LUMBOSACRAL JOINT WITH AUTOLOGOUS TISSUE SUBSTITUTE, POSTERIOR APPROACH, POSTERIOR COLUMN, OPEN APPROACH: ICD-10-PCS | Performed by: ORTHOPAEDIC SURGERY

## 2024-07-24 PROCEDURE — 0SG30AJ FUSION OF LUMBOSACRAL JOINT WITH INTERBODY FUSION DEVICE, POSTERIOR APPROACH, ANTERIOR COLUMN, OPEN APPROACH: ICD-10-PCS | Performed by: ORTHOPAEDIC SURGERY

## 2024-07-24 PROCEDURE — 63052 LAM FACETC/FRMT ARTHRD LUM 1: CPT | Performed by: PHYSICIAN ASSISTANT

## 2024-07-24 PROCEDURE — 22633 ARTHRD CMBN 1NTRSPC LUMBAR: CPT | Performed by: PHYSICIAN ASSISTANT

## 2024-07-24 PROCEDURE — 7100000001 HC PACU RECOVERY - ADDTL 15 MIN: Performed by: ORTHOPAEDIC SURGERY

## 2024-07-24 PROCEDURE — 0SG0071 FUSION OF LUMBAR VERTEBRAL JOINT WITH AUTOLOGOUS TISSUE SUBSTITUTE, POSTERIOR APPROACH, POSTERIOR COLUMN, OPEN APPROACH: ICD-10-PCS | Performed by: ORTHOPAEDIC SURGERY

## 2024-07-24 PROCEDURE — 6360000002 HC RX W HCPCS: Performed by: ORTHOPAEDIC SURGERY

## 2024-07-24 PROCEDURE — 01NB0ZZ RELEASE LUMBAR NERVE, OPEN APPROACH: ICD-10-PCS | Performed by: ORTHOPAEDIC SURGERY

## 2024-07-24 PROCEDURE — C1713 ANCHOR/SCREW BN/BN,TIS/BN: HCPCS | Performed by: ORTHOPAEDIC SURGERY

## 2024-07-24 PROCEDURE — 7100000000 HC PACU RECOVERY - FIRST 15 MIN: Performed by: ORTHOPAEDIC SURGERY

## 2024-07-24 PROCEDURE — 6360000002 HC RX W HCPCS

## 2024-07-24 PROCEDURE — 2720000010 HC SURG SUPPLY STERILE: Performed by: ORTHOPAEDIC SURGERY

## 2024-07-24 PROCEDURE — 6370000000 HC RX 637 (ALT 250 FOR IP): Performed by: ORTHOPAEDIC SURGERY

## 2024-07-24 PROCEDURE — 63053 LAM FACTC/FRMT ARTHRD LUM EA: CPT | Performed by: PHYSICIAN ASSISTANT

## 2024-07-24 PROCEDURE — 2500000003 HC RX 250 WO HCPCS

## 2024-07-24 PROCEDURE — 63053 LAM FACTC/FRMT ARTHRD LUM EA: CPT | Performed by: ORTHOPAEDIC SURGERY

## 2024-07-24 PROCEDURE — 1200000000 HC SEMI PRIVATE

## 2024-07-24 PROCEDURE — 22634 ARTHRD CMBN 1NTRSPC EA ADDL: CPT | Performed by: ORTHOPAEDIC SURGERY

## 2024-07-24 PROCEDURE — 2709999900 HC NON-CHARGEABLE SUPPLY: Performed by: ORTHOPAEDIC SURGERY

## 2024-07-24 PROCEDURE — 22853 INSJ BIOMECHANICAL DEVICE: CPT | Performed by: ORTHOPAEDIC SURGERY

## 2024-07-24 PROCEDURE — 3700000001 HC ADD 15 MINUTES (ANESTHESIA): Performed by: ORTHOPAEDIC SURGERY

## 2024-07-24 DEVICE — SCREW SET EVEREST: Type: IMPLANTABLE DEVICE | Site: SPINE LUMBAR | Status: FUNCTIONAL

## 2024-07-24 DEVICE — IMPLANTABLE DEVICE: Type: IMPLANTABLE DEVICE | Site: SPINE LUMBAR | Status: FUNCTIONAL

## 2024-07-24 DEVICE — SPACER SPNL 15 DEG LG 28X10 MM STRL PROLIFT: Type: IMPLANTABLE DEVICE | Site: SPINE LUMBAR | Status: FUNCTIONAL

## 2024-07-24 DEVICE — ROD SPNL CONTOURED 5.5X65 MM LUMBAR TI DENALI: Type: IMPLANTABLE DEVICE | Site: SPINE LUMBAR | Status: FUNCTIONAL

## 2024-07-24 DEVICE — GRAFT BNE CHIP FRZ DRY CANC CORT 1MM 8MM RANG 30CC READIGRFT: Type: IMPLANTABLE DEVICE | Site: SPINE LUMBAR | Status: FUNCTIONAL

## 2024-07-24 RX ORDER — MORPHINE SULFATE 2 MG/ML
2 INJECTION, SOLUTION INTRAMUSCULAR; INTRAVENOUS
Status: DISCONTINUED | OUTPATIENT
Start: 2024-07-24 | End: 2024-07-27 | Stop reason: HOSPADM

## 2024-07-24 RX ORDER — BUPIVACAINE HYDROCHLORIDE AND EPINEPHRINE 2.5; 5 MG/ML; UG/ML
INJECTION, SOLUTION EPIDURAL; INFILTRATION; INTRACAUDAL; PERINEURAL PRN
Status: DISCONTINUED | OUTPATIENT
Start: 2024-07-24 | End: 2024-07-24 | Stop reason: ALTCHOICE

## 2024-07-24 RX ORDER — METOCLOPRAMIDE HYDROCHLORIDE 5 MG/ML
10 INJECTION INTRAMUSCULAR; INTRAVENOUS
Status: DISCONTINUED | OUTPATIENT
Start: 2024-07-24 | End: 2024-07-24

## 2024-07-24 RX ORDER — ONDANSETRON 2 MG/ML
4 INJECTION INTRAMUSCULAR; INTRAVENOUS
Status: DISCONTINUED | OUTPATIENT
Start: 2024-07-24 | End: 2024-07-24

## 2024-07-24 RX ORDER — GABAPENTIN 100 MG/1
100 CAPSULE ORAL ONCE
Status: COMPLETED | OUTPATIENT
Start: 2024-07-24 | End: 2024-07-24

## 2024-07-24 RX ORDER — LIDOCAINE HYDROCHLORIDE 10 MG/ML
1 INJECTION, SOLUTION EPIDURAL; INFILTRATION; INTRACAUDAL; PERINEURAL
Status: DISCONTINUED | OUTPATIENT
Start: 2024-07-24 | End: 2024-07-24

## 2024-07-24 RX ORDER — CARVEDILOL 3.12 MG/1
3.12 TABLET ORAL 2 TIMES DAILY
Status: DISCONTINUED | OUTPATIENT
Start: 2024-07-24 | End: 2024-07-27 | Stop reason: HOSPADM

## 2024-07-24 RX ORDER — ONDANSETRON 2 MG/ML
4 INJECTION INTRAMUSCULAR; INTRAVENOUS EVERY 6 HOURS PRN
Status: DISCONTINUED | OUTPATIENT
Start: 2024-07-24 | End: 2024-07-27 | Stop reason: HOSPADM

## 2024-07-24 RX ORDER — OXYCODONE HYDROCHLORIDE 5 MG/1
5 TABLET ORAL EVERY 4 HOURS PRN
Status: DISCONTINUED | OUTPATIENT
Start: 2024-07-24 | End: 2024-07-27 | Stop reason: HOSPADM

## 2024-07-24 RX ORDER — SODIUM CHLORIDE 0.9 % (FLUSH) 0.9 %
5-40 SYRINGE (ML) INJECTION EVERY 12 HOURS SCHEDULED
Status: DISCONTINUED | OUTPATIENT
Start: 2024-07-24 | End: 2024-07-27 | Stop reason: HOSPADM

## 2024-07-24 RX ORDER — KETAMINE HCL IN NACL, ISO-OSM 100MG/10ML
SYRINGE (ML) INJECTION PRN
Status: DISCONTINUED | OUTPATIENT
Start: 2024-07-24 | End: 2024-07-24 | Stop reason: SDUPTHER

## 2024-07-24 RX ORDER — SODIUM CHLORIDE 0.9 % (FLUSH) 0.9 %
5-40 SYRINGE (ML) INJECTION PRN
Status: DISCONTINUED | OUTPATIENT
Start: 2024-07-24 | End: 2024-07-24

## 2024-07-24 RX ORDER — MORPHINE SULFATE 2 MG/ML
4 INJECTION, SOLUTION INTRAMUSCULAR; INTRAVENOUS
Status: DISCONTINUED | OUTPATIENT
Start: 2024-07-24 | End: 2024-07-27 | Stop reason: HOSPADM

## 2024-07-24 RX ORDER — ATORVASTATIN CALCIUM 10 MG/1
10 TABLET, FILM COATED ORAL NIGHTLY
Status: DISCONTINUED | OUTPATIENT
Start: 2024-07-24 | End: 2024-07-27 | Stop reason: HOSPADM

## 2024-07-24 RX ORDER — SODIUM CHLORIDE 9 MG/ML
INJECTION, SOLUTION INTRAVENOUS PRN
Status: DISCONTINUED | OUTPATIENT
Start: 2024-07-24 | End: 2024-07-27 | Stop reason: HOSPADM

## 2024-07-24 RX ORDER — TRANEXAMIC ACID 100 MG/ML
INJECTION, SOLUTION INTRAVENOUS PRN
Status: DISCONTINUED | OUTPATIENT
Start: 2024-07-24 | End: 2024-07-24 | Stop reason: SDUPTHER

## 2024-07-24 RX ORDER — SODIUM CHLORIDE, SODIUM LACTATE, POTASSIUM CHLORIDE, CALCIUM CHLORIDE 600; 310; 30; 20 MG/100ML; MG/100ML; MG/100ML; MG/100ML
INJECTION, SOLUTION INTRAVENOUS CONTINUOUS
Status: DISCONTINUED | OUTPATIENT
Start: 2024-07-24 | End: 2024-07-24

## 2024-07-24 RX ORDER — ROCURONIUM BROMIDE 10 MG/ML
INJECTION, SOLUTION INTRAVENOUS PRN
Status: DISCONTINUED | OUTPATIENT
Start: 2024-07-24 | End: 2024-07-24 | Stop reason: SDUPTHER

## 2024-07-24 RX ORDER — M-VIT,TX,IRON,MINS/CALC/FOLIC 27MG-0.4MG
1 TABLET ORAL DAILY
Status: DISCONTINUED | OUTPATIENT
Start: 2024-07-25 | End: 2024-07-27 | Stop reason: HOSPADM

## 2024-07-24 RX ORDER — NALOXONE HYDROCHLORIDE 0.4 MG/ML
INJECTION, SOLUTION INTRAMUSCULAR; INTRAVENOUS; SUBCUTANEOUS PRN
Status: DISCONTINUED | OUTPATIENT
Start: 2024-07-24 | End: 2024-07-24 | Stop reason: SDUPTHER

## 2024-07-24 RX ORDER — OXYCODONE HYDROCHLORIDE 5 MG/1
10 TABLET ORAL EVERY 4 HOURS PRN
Status: DISCONTINUED | OUTPATIENT
Start: 2024-07-24 | End: 2024-07-27 | Stop reason: HOSPADM

## 2024-07-24 RX ORDER — PANTOPRAZOLE SODIUM 40 MG/1
40 TABLET, DELAYED RELEASE ORAL
Status: DISCONTINUED | OUTPATIENT
Start: 2024-07-25 | End: 2024-07-27 | Stop reason: HOSPADM

## 2024-07-24 RX ORDER — FENTANYL CITRATE 0.05 MG/ML
25 INJECTION, SOLUTION INTRAMUSCULAR; INTRAVENOUS EVERY 5 MIN PRN
Status: DISCONTINUED | OUTPATIENT
Start: 2024-07-24 | End: 2024-07-24

## 2024-07-24 RX ORDER — SPIRONOLACTONE 25 MG/1
50 TABLET ORAL DAILY
Status: DISCONTINUED | OUTPATIENT
Start: 2024-07-25 | End: 2024-07-27 | Stop reason: HOSPADM

## 2024-07-24 RX ORDER — HYDROMORPHONE HYDROCHLORIDE 2 MG/ML
INJECTION, SOLUTION INTRAMUSCULAR; INTRAVENOUS; SUBCUTANEOUS PRN
Status: DISCONTINUED | OUTPATIENT
Start: 2024-07-24 | End: 2024-07-24 | Stop reason: SDUPTHER

## 2024-07-24 RX ORDER — VANCOMYCIN HYDROCHLORIDE 1 G/20ML
INJECTION, POWDER, LYOPHILIZED, FOR SOLUTION INTRAVENOUS PRN
Status: DISCONTINUED | OUTPATIENT
Start: 2024-07-24 | End: 2024-07-24 | Stop reason: ALTCHOICE

## 2024-07-24 RX ORDER — ONDANSETRON 2 MG/ML
INJECTION INTRAMUSCULAR; INTRAVENOUS PRN
Status: DISCONTINUED | OUTPATIENT
Start: 2024-07-24 | End: 2024-07-24 | Stop reason: SDUPTHER

## 2024-07-24 RX ORDER — SODIUM CHLORIDE 9 MG/ML
INJECTION, SOLUTION INTRAVENOUS PRN
Status: DISCONTINUED | OUTPATIENT
Start: 2024-07-24 | End: 2024-07-24

## 2024-07-24 RX ORDER — SODIUM CHLORIDE 0.9 % (FLUSH) 0.9 %
5-40 SYRINGE (ML) INJECTION PRN
Status: DISCONTINUED | OUTPATIENT
Start: 2024-07-24 | End: 2024-07-27 | Stop reason: HOSPADM

## 2024-07-24 RX ORDER — DEXAMETHASONE SODIUM PHOSPHATE 4 MG/ML
INJECTION, SOLUTION INTRA-ARTICULAR; INTRALESIONAL; INTRAMUSCULAR; INTRAVENOUS; SOFT TISSUE PRN
Status: DISCONTINUED | OUTPATIENT
Start: 2024-07-24 | End: 2024-07-24 | Stop reason: SDUPTHER

## 2024-07-24 RX ORDER — PROMETHAZINE HYDROCHLORIDE 12.5 MG/1
12.5 TABLET ORAL EVERY 6 HOURS PRN
Status: DISCONTINUED | OUTPATIENT
Start: 2024-07-24 | End: 2024-07-27 | Stop reason: HOSPADM

## 2024-07-24 RX ORDER — ASCORBIC ACID 500 MG
500 TABLET ORAL DAILY
Status: DISCONTINUED | OUTPATIENT
Start: 2024-07-25 | End: 2024-07-27 | Stop reason: HOSPADM

## 2024-07-24 RX ORDER — NALOXONE HYDROCHLORIDE 0.4 MG/ML
INJECTION, SOLUTION INTRAMUSCULAR; INTRAVENOUS; SUBCUTANEOUS PRN
Status: DISCONTINUED | OUTPATIENT
Start: 2024-07-24 | End: 2024-07-24

## 2024-07-24 RX ORDER — PROPOFOL 10 MG/ML
INJECTION, EMULSION INTRAVENOUS CONTINUOUS PRN
Status: DISCONTINUED | OUTPATIENT
Start: 2024-07-24 | End: 2024-07-24 | Stop reason: SDUPTHER

## 2024-07-24 RX ORDER — FENTANYL CITRATE 50 UG/ML
INJECTION, SOLUTION INTRAMUSCULAR; INTRAVENOUS PRN
Status: DISCONTINUED | OUTPATIENT
Start: 2024-07-24 | End: 2024-07-24 | Stop reason: SDUPTHER

## 2024-07-24 RX ORDER — DIPHENHYDRAMINE HYDROCHLORIDE 50 MG/ML
12.5 INJECTION INTRAMUSCULAR; INTRAVENOUS
Status: DISCONTINUED | OUTPATIENT
Start: 2024-07-24 | End: 2024-07-24

## 2024-07-24 RX ORDER — SODIUM CHLORIDE 0.9 % (FLUSH) 0.9 %
5-40 SYRINGE (ML) INJECTION EVERY 12 HOURS SCHEDULED
Status: DISCONTINUED | OUTPATIENT
Start: 2024-07-24 | End: 2024-07-24

## 2024-07-24 RX ORDER — GLYCOPYRROLATE 0.2 MG/ML
INJECTION INTRAMUSCULAR; INTRAVENOUS PRN
Status: DISCONTINUED | OUTPATIENT
Start: 2024-07-24 | End: 2024-07-24 | Stop reason: SDUPTHER

## 2024-07-24 RX ORDER — ACETAMINOPHEN 500 MG
1000 TABLET ORAL ONCE
Status: COMPLETED | OUTPATIENT
Start: 2024-07-24 | End: 2024-07-24

## 2024-07-24 RX ORDER — SENNOSIDES A AND B 8.6 MG/1
1 TABLET, FILM COATED ORAL DAILY
Status: DISCONTINUED | OUTPATIENT
Start: 2024-07-25 | End: 2024-07-27 | Stop reason: HOSPADM

## 2024-07-24 RX ORDER — CLONIDINE HYDROCHLORIDE 0.3 MG/1
0.3 TABLET ORAL NIGHTLY
Status: DISCONTINUED | OUTPATIENT
Start: 2024-07-24 | End: 2024-07-27 | Stop reason: HOSPADM

## 2024-07-24 RX ORDER — MIDAZOLAM HYDROCHLORIDE 1 MG/ML
INJECTION INTRAMUSCULAR; INTRAVENOUS PRN
Status: DISCONTINUED | OUTPATIENT
Start: 2024-07-24 | End: 2024-07-24 | Stop reason: SDUPTHER

## 2024-07-24 RX ORDER — LIDOCAINE HYDROCHLORIDE 20 MG/ML
INJECTION, SOLUTION EPIDURAL; INFILTRATION; INTRACAUDAL; PERINEURAL PRN
Status: DISCONTINUED | OUTPATIENT
Start: 2024-07-24 | End: 2024-07-24 | Stop reason: SDUPTHER

## 2024-07-24 RX ORDER — EPHEDRINE SULFATE/0.9% NACL/PF 25 MG/5 ML
SYRINGE (ML) INTRAVENOUS PRN
Status: DISCONTINUED | OUTPATIENT
Start: 2024-07-24 | End: 2024-07-24 | Stop reason: SDUPTHER

## 2024-07-24 RX ADMIN — Medication 15 MG: at 07:59

## 2024-07-24 RX ADMIN — SODIUM CHLORIDE, POTASSIUM CHLORIDE, SODIUM LACTATE AND CALCIUM CHLORIDE: 600; 310; 30; 20 INJECTION, SOLUTION INTRAVENOUS at 06:21

## 2024-07-24 RX ADMIN — OXYCODONE HYDROCHLORIDE 5 MG: 5 TABLET ORAL at 21:22

## 2024-07-24 RX ADMIN — SODIUM CHLORIDE, POTASSIUM CHLORIDE, SODIUM LACTATE AND CALCIUM CHLORIDE: 600; 310; 30; 20 INJECTION, SOLUTION INTRAVENOUS at 10:57

## 2024-07-24 RX ADMIN — GLYCOPYRROLATE 0.1 MG: 0.2 INJECTION INTRAMUSCULAR; INTRAVENOUS at 07:35

## 2024-07-24 RX ADMIN — ACETAMINOPHEN 1000 MG: 500 TABLET ORAL at 06:23

## 2024-07-24 RX ADMIN — CLONIDINE HYDROCHLORIDE 0.3 MG: 0.3 TABLET ORAL at 23:39

## 2024-07-24 RX ADMIN — CARVEDILOL 3.12 MG: 3.12 TABLET, FILM COATED ORAL at 21:22

## 2024-07-24 RX ADMIN — ONDANSETRON 4 MG: 2 INJECTION INTRAMUSCULAR; INTRAVENOUS at 12:06

## 2024-07-24 RX ADMIN — HYDROMORPHONE HYDROCHLORIDE 0.4 MG: 2 INJECTION, SOLUTION INTRAMUSCULAR; INTRAVENOUS; SUBCUTANEOUS at 08:12

## 2024-07-24 RX ADMIN — TRANEXAMIC ACID 1000 MG: 100 INJECTION, SOLUTION INTRAVENOUS at 08:06

## 2024-07-24 RX ADMIN — HYDROMORPHONE HYDROCHLORIDE 0.5 MG: 2 INJECTION, SOLUTION INTRAMUSCULAR; INTRAVENOUS; SUBCUTANEOUS at 12:02

## 2024-07-24 RX ADMIN — DEXAMETHASONE SODIUM PHOSPHATE 8 MG: 4 INJECTION INTRA-ARTICULAR; INTRALESIONAL; INTRAMUSCULAR; INTRAVENOUS; SOFT TISSUE at 07:27

## 2024-07-24 RX ADMIN — PROPOFOL 150 MG: 10 INJECTION, EMULSION INTRAVENOUS at 07:23

## 2024-07-24 RX ADMIN — EPHEDRINE SULFATE 5 MG: 5 INJECTION INTRAVENOUS at 07:40

## 2024-07-24 RX ADMIN — ATORVASTATIN CALCIUM 10 MG: 10 TABLET, FILM COATED ORAL at 21:22

## 2024-07-24 RX ADMIN — Medication 2000 MG: at 07:29

## 2024-07-24 RX ADMIN — Medication 2000 MG: at 19:48

## 2024-07-24 RX ADMIN — Medication 10 MG: at 08:46

## 2024-07-24 RX ADMIN — HYDROMORPHONE HYDROCHLORIDE 0.4 MG: 2 INJECTION, SOLUTION INTRAMUSCULAR; INTRAVENOUS; SUBCUTANEOUS at 09:50

## 2024-07-24 RX ADMIN — Medication 2000 MG: at 11:25

## 2024-07-24 RX ADMIN — Medication 25 MG: at 07:29

## 2024-07-24 RX ADMIN — ROCURONIUM BROMIDE 40 MG: 10 INJECTION, SOLUTION INTRAVENOUS at 07:23

## 2024-07-24 RX ADMIN — FENTANYL CITRATE 50 MCG: 50 INJECTION, SOLUTION INTRAMUSCULAR; INTRAVENOUS at 07:59

## 2024-07-24 RX ADMIN — MIDAZOLAM 2 MG: 1 INJECTION INTRAMUSCULAR; INTRAVENOUS at 07:21

## 2024-07-24 RX ADMIN — HYDROMORPHONE HYDROCHLORIDE 0.2 MG: 2 INJECTION, SOLUTION INTRAMUSCULAR; INTRAVENOUS; SUBCUTANEOUS at 10:00

## 2024-07-24 RX ADMIN — HYDROMORPHONE HYDROCHLORIDE 0.6 MG: 2 INJECTION, SOLUTION INTRAMUSCULAR; INTRAVENOUS; SUBCUTANEOUS at 10:15

## 2024-07-24 RX ADMIN — GLYCOPYRROLATE 0.1 MG: 0.2 INJECTION INTRAMUSCULAR; INTRAVENOUS at 07:38

## 2024-07-24 RX ADMIN — GABAPENTIN 100 MG: 100 CAPSULE ORAL at 06:23

## 2024-07-24 RX ADMIN — NALOXONE HYDROCHLORIDE 0.02 MG: 0.4 INJECTION, SOLUTION INTRAMUSCULAR; INTRAVENOUS; SUBCUTANEOUS at 12:37

## 2024-07-24 RX ADMIN — NALOXONE HYDROCHLORIDE 0.04 MG: 0.4 INJECTION, SOLUTION INTRAMUSCULAR; INTRAVENOUS; SUBCUTANEOUS at 12:35

## 2024-07-24 RX ADMIN — LIDOCAINE HYDROCHLORIDE 100 MG: 20 INJECTION, SOLUTION EPIDURAL; INFILTRATION; INTRACAUDAL; PERINEURAL at 07:23

## 2024-07-24 RX ADMIN — FENTANYL CITRATE 50 MCG: 50 INJECTION, SOLUTION INTRAMUSCULAR; INTRAVENOUS at 07:23

## 2024-07-24 RX ADMIN — PROPOFOL 150 MCG/KG/MIN: 10 INJECTION, EMULSION INTRAVENOUS at 10:21

## 2024-07-24 RX ADMIN — PROPOFOL 100 MCG/KG/MIN: 10 INJECTION, EMULSION INTRAVENOUS at 11:56

## 2024-07-24 RX ADMIN — PROPOFOL 100 MCG/KG/MIN: 10 INJECTION, EMULSION INTRAVENOUS at 07:30

## 2024-07-24 RX ADMIN — HYDROMORPHONE HYDROCHLORIDE 0.4 MG: 2 INJECTION, SOLUTION INTRAMUSCULAR; INTRAVENOUS; SUBCUTANEOUS at 09:15

## 2024-07-24 RX ADMIN — HYDROMORPHONE HYDROCHLORIDE 0.5 MG: 2 INJECTION, SOLUTION INTRAMUSCULAR; INTRAVENOUS; SUBCUTANEOUS at 10:47

## 2024-07-24 RX ADMIN — ONDANSETRON 4 MG: 2 INJECTION INTRAMUSCULAR; INTRAVENOUS at 07:27

## 2024-07-24 RX ADMIN — PROPOFOL 125 MCG/KG/MIN: 10 INJECTION, EMULSION INTRAVENOUS at 08:46

## 2024-07-24 ASSESSMENT — PAIN DESCRIPTION - ORIENTATION
ORIENTATION: MID
ORIENTATION: LOWER

## 2024-07-24 ASSESSMENT — PAIN SCALES - GENERAL
PAINLEVEL_OUTOF10: 3
PAINLEVEL_OUTOF10: 6
PAINLEVEL_OUTOF10: 3
PAINLEVEL_OUTOF10: 2

## 2024-07-24 ASSESSMENT — PAIN DESCRIPTION - DESCRIPTORS
DESCRIPTORS: ACHING
DESCRIPTORS: DULL
DESCRIPTORS: DULL;ACHING
DESCRIPTORS: ACHING

## 2024-07-24 ASSESSMENT — PAIN - FUNCTIONAL ASSESSMENT
PAIN_FUNCTIONAL_ASSESSMENT: 0-10
PAIN_FUNCTIONAL_ASSESSMENT: NONE - DENIES PAIN

## 2024-07-24 ASSESSMENT — PAIN DESCRIPTION - LOCATION
LOCATION: BACK

## 2024-07-24 ASSESSMENT — ENCOUNTER SYMPTOMS: SHORTNESS OF BREATH: 1

## 2024-07-24 NOTE — BRIEF OP NOTE
Brief Postoperative Note      Patient: Muna oLmax  YOB: 1950  MRN: [id number]    Date of Procedure: 7/24/2024    Pre-Op Diagnosis Codes:     * Spondylolisthesis of lumbar region [M43.16] lumbar spinal stenosis    Post-Op Diagnosis: Same       Procedure(s):  POSTERIOR DECOMPRESSION  INSTRUMENTED FUSION L4-S1  L4-S1 PLIF  Surgeon(s):  Lisbet Topete PA Beeks, David A, MD    Assistant:  * No surgical staff found *    Anesthesia: General    Estimated Blood Loss (mL): 400    Complications: None    Specimens:   * No specimens in log *    Implants:  Implant Name Type Inv. Item Serial No.  Lot No. LRB No. Used Action   SCREW SPNL POLYAX 5.5X40 MM MAIA FEN INVASIVE EVEREST - [id number]  SCREW SPNL POLYAX 5.5X40 MM MAIA FEN INVASIVE EVEREST  ZACH SPINE HOW-WD  N/A 1 Implanted   SCREW SPNL POLYAX 6.5X40 MM MAIA FEN INVASIVE EVEREST - [id number]  SCREW SPNL POLYAX 6.5X40 MM MAIA FEN INVASIVE EVEREST  ZACH SPINE HOW-WD  N/A 5 Implanted   SCREW SET EVEREST - [id number]  SCREW SET EVEREST  ZACH SPINE HOW-WD  N/A 6 Implanted   SPACER SPNL 15 DEG LG 28X10 MM STRL PROLIFT - [id number]  SPACER SPNL 15 DEG LG 28X10 MM STRL PROLIFT  ZACH SPINE HOW-WD [id number] N/A 2 Implanted   SPACER SPNL 15 DEG LG 28X10 MM STRL PROLIFT - [id number]  SPACER SPNL 15 DEG LG 28X10 MM STRL PROLIFT  ZACH SPINE HOW-WD [id number] N/A 2 Implanted   GRAFT BNE CHIP FRZ DRY CANC CHIVO 1MM 8MM RANG 30CC READIGRFT - [id number]  GRAFT BNE CHIP FRZ DRY CANC CHIVO 1MM 8MM RANG 30CC READIGRFT  [facility] TISSUE [facility]-WD  N/A 1 Implanted   ESTRELLA SPNL CONTOURED 5.5X65 MM LUMBAR TI MARITZA - [id number]  ESTRELLA SPNL CONTOURED 5.5X65 MM LUMBAR TI MARITZA  ZACH SPINE HOW-WD  N/A 1 Implanted   ESTRELLA SPNL CONTOURED 5.5X70 MM LUMBAR TI MARITZA - [id number]  ESTRELLA SPNL CONTOURED 5.5X70 MM LUMBAR TI MARITZA  ZACH SPINE HOWM-WD  N/A 1 Implanted         Drains: * No LDAs found *    Findings:  Infection Present At Time Of Surgery  (PATOS) (choose all levels that have infection present):  No infection present  Other Findings: see dictation    Electronically signed by Noam Vallejo MD on 7/24/2024 at 12:29 PM

## 2024-07-24 NOTE — ANESTHESIA PRE PROCEDURE
Department of Anesthesiology  Preprocedure Note       Name:  Muna Lomax   Age:  74 y.o.  :  1950                                          MRN:  [id number]         Date:  2024      Surgeon: Surgeon(s):  Noam Vallejo MD    Procedure: Procedure(s):  POSTERIOR DECOMPRESSION  INSTRUMENTED FUSION L4-S1    Medications prior to admission:   Prior to Admission medications    Medication Sig Start Date End Date Taking? Authorizing Provider   senna (SENOKOT) 8.6 MG tablet Take 1 tablet by mouth daily as needed for Constipation    Niels Can MD   carvedilol (COREG) 3.125 MG tablet Take 1 tablet by mouth 2 times daily 24   Noelle Sorto APRN - CNP   ibuprofen (ADVIL;MOTRIN) 800 MG tablet Take 1 tablet by mouth 3 times daily with meals 24   Arron Johnson MD   spironolactone (ALDACTONE) 50 MG tablet TAKE 1 TABLET BY MOUTH EVERY DAY WITH LUNCH 24   Abbey Plasencia MD   triamcinolone (KENALOG) 0.1 % ointment Apply topically 2 times daily Apply as needed for vulvar itching 6/3/24   Miladis Steele APRN - CNP   clindamycin (CLEOCIN) 300 MG capsule Patient will take 6 prior to dentist appointment next week. 1/15/24   Niels Can MD   Magnesium Citrate 125 MG CAPS  23   Niels Can MD   simvastatin (ZOCOR) 20 MG tablet TAKE ONE TABLET BY MOUTH DAILY 23   Abbey Plasencia MD   cloNIDine (CATAPRES) 0.3 MG tablet TAKE ONE TABLET BY MOUTH ONCE NIGHTLY 23   Abbey Plasencia MD   Cranberry 200 MG CAPS Take by mouth    Niels Can MD   Handicap Placard MISC by Does not apply route For orthopedic issues, expires 2027   Abbey Plasencia MD   Coenzyme Q10 (CO Q 10) 100 MG CAPS Take by mouth    Niels Can MD   Cholecalciferol (VITAMIN D3) 50 MCG ( UT) CAPS Take by mouth 2 tablets daily    Niels Can MD   Probiotic Product (PROBIOTIC-10 PO) Take by mouth    Niels Can MD   Omega-3 Fatty Acids

## 2024-07-24 NOTE — OP NOTE
[facility]                [address] [place] [address]                            OPERATIVE REPORT      PATIENT NAME: HALLIE MONTERO               : 1950  MED REC NO: [id number]                          ROOM: [facility]  ACCOUNT NO: [id number]                       ADMIT DATE: 2024  PROVIDER: Noam Vallejo MD      DATE OF PROCEDURE:  2024    SURGEON:  Noam Vallejo MD    PREOPERATIVE DIAGNOSIS:  Lumbar spinal stenosis at L4-5, L5-S1 with acquired spondylolisthesis, L4-5.    POSTOPERATIVE DIAGNOSIS:  Lumbar spinal stenosis at L4-5, L5-S1 with acquired spondylolisthesis, L4-5.    PROCEDURES PERFORMED:    1. L4-5 and L5-S1 decompression with laminectomy, bilateral partial medial facetectomies, neural foraminotomies.  2. L4 to sacrum posterior segmental instrumentation.  3. L4-5 and L5-S1 posterolateral fusion.  4. L4-5 and L5-S1 posterior interbody fusion with application of vertebral body fusion cages.  5. Local autograft bone grafting, nonstructural allograft bone grafting and fluoroscopic assistance.    FIRST ASSISTANT:  XAVIER Yu, utilized for patient positioning, wound retraction, suctioning, aid in assembling the construct, and skin closure.    ANESTHESIA:  General.    ESTIMATED BLOOD LOSS:  400.    COMPLICATIONS:  None.    IMPLANT:  [place] K2 Everest screw shantal system with 15-degree lordotic expandable intervertebral body fusion cages.    DRAINS:  None.    FINDINGS:  Fluoroscopy utilized for level localization and pedicle screw placement.  Post pedicle screw placement, pedicle screws were checked AP, lateral and en face .  Appeared to be acceptable.  Final AP and lateral fluoroscopic images showed acceptable disk space height, restoration.  Roughly a 50% reduction of the spondylolisthesis at L4-5, acceptable disk space height and lumbar lordosis restoration.    PROCEDURE IN DETAIL:  The patient was taken to the operating room, placed

## 2024-07-24 NOTE — INTERVAL H&P NOTE
Update History & Physical    The patient's History and Physical of July 10, 2024 was reviewed with the patient and I examined the patient. There was no change. The surgical site was confirmed by the patient and me. Pt undergoing for POSTERIOR DECOMPRESSION  INSTRUMENTED FUSION L4-S1 per Dr. Vallejo     Pt denies fever/chills, chest pain or SOB  Pt NPO since the past midnight , pt took her am medication today with sip of water  Pt denies hx of MRSA infection   Pt denies hx of blood clots  Pt stopped taking Asa two weeks ago   Patient denies any personal or family problems with anesthesia .   Physical exam remains unchanged including cardiac and pulmonary assessment   See nursing flow sheet for vital sings     Lab Results   Component Value Date    WBC 7.4 07/10/2024    HGB 14.4 07/10/2024    HCT 43.1 07/10/2024    MCV 86.7 07/10/2024     07/10/2024     Lab Results   Component Value Date/Time     07/10/2024 10:54 AM    K 4.7 07/10/2024 10:54 AM     07/10/2024 10:54 AM    CO2 25 07/10/2024 10:54 AM    BUN 16 07/10/2024 10:54 AM    CREATININE 0.9 07/10/2024 10:54 AM    GLUCOSE 93 07/10/2024 10:54 AM    CALCIUM 9.4 07/10/2024 10:54 AM    LABGLOM 68 07/10/2024 10:54 AM    LABGLOM 53 02/29/2024 09:17 AM      Electronically signed by YARA Davidson CNP on 7/24/2024 at 5:45 AM

## 2024-07-24 NOTE — ANESTHESIA POSTPROCEDURE EVALUATION
Department of Anesthesiology  Postprocedure Note    Patient: Muna Lomax  MRN: [id number]  YOB: 1950  Date of evaluation: 7/24/2024    Procedure Summary       Date: 07/24/24 Room / Location: [address]    Anesthesia Start: 0719 Anesthesia Stop: 1250    Procedure: POSTERIOR DECOMPRESSION  INSTRUMENTED FUSION L4-S1 (Spine Lumbar) Diagnosis:       Spondylolisthesis of lumbar region      (Spondylolisthesis of lumbar region [M43.16])    Surgeons: Noam Vallejo MD Responsible Provider: Justen Silva MD    Anesthesia Type: General ASA Status: 2            Anesthesia Type: General    Kevin Phase I: Kevin Score: 8    Kevin Phase II:      Anesthesia Post Evaluation    Comments: POST- ANESTHESIA EVALUATION       Pt Name: Muna Lomax  MRN: [id number]  YOB: 1950  Date of evaluation: 7/24/2024  Time:  3:41 PM      /84   Pulse 86   Temp 97.9 °F (36.6 °C)   Resp 21   Ht 1.638 m (5' 4.5\")   Wt 92.5 kg (204 lb)   SpO2 94%   BMI 34.48 kg/m²      Consciousness Level  Awake  Cardiopulmonary Status  Stable  Pain Adequately Treated YES  Nausea / Vomiting  NO  Adequate Hydration  YES  Anesthesia Related Complications NONE      Electronically signed by Romy Gauthier MD on 7/24/2024 at 3:41 PM      No notable events documented.

## 2024-07-25 PROCEDURE — 1200000000 HC SEMI PRIVATE

## 2024-07-25 PROCEDURE — 97162 PT EVAL MOD COMPLEX 30 MIN: CPT

## 2024-07-25 PROCEDURE — 6370000000 HC RX 637 (ALT 250 FOR IP): Performed by: ORTHOPAEDIC SURGERY

## 2024-07-25 PROCEDURE — 99024 POSTOP FOLLOW-UP VISIT: CPT | Performed by: ORTHOPAEDIC SURGERY

## 2024-07-25 PROCEDURE — 97535 SELF CARE MNGMENT TRAINING: CPT

## 2024-07-25 PROCEDURE — 97166 OT EVAL MOD COMPLEX 45 MIN: CPT

## 2024-07-25 PROCEDURE — 97116 GAIT TRAINING THERAPY: CPT

## 2024-07-25 PROCEDURE — 2580000003 HC RX 258: Performed by: ORTHOPAEDIC SURGERY

## 2024-07-25 PROCEDURE — 6360000002 HC RX W HCPCS: Performed by: ORTHOPAEDIC SURGERY

## 2024-07-25 RX ORDER — OXYCODONE HYDROCHLORIDE AND ACETAMINOPHEN 5; 325 MG/1; MG/1
1 TABLET ORAL EVERY 6 HOURS PRN
Qty: 28 TABLET | Refills: 0 | Status: SHIPPED | OUTPATIENT
Start: 2024-07-25 | End: 2024-08-01

## 2024-07-25 RX ORDER — ACETAMINOPHEN 325 MG/1
650 TABLET ORAL EVERY 6 HOURS PRN
Status: DISCONTINUED | OUTPATIENT
Start: 2024-07-25 | End: 2024-07-27 | Stop reason: HOSPADM

## 2024-07-25 RX ADMIN — CLONIDINE HYDROCHLORIDE 0.3 MG: 0.3 TABLET ORAL at 20:27

## 2024-07-25 RX ADMIN — PANTOPRAZOLE SODIUM 40 MG: 40 TABLET, DELAYED RELEASE ORAL at 06:18

## 2024-07-25 RX ADMIN — OXYCODONE HYDROCHLORIDE 5 MG: 5 TABLET ORAL at 03:33

## 2024-07-25 RX ADMIN — OXYCODONE HYDROCHLORIDE 5 MG: 5 TABLET ORAL at 17:56

## 2024-07-25 RX ADMIN — OXYCODONE HYDROCHLORIDE AND ACETAMINOPHEN 500 MG: 500 TABLET ORAL at 08:40

## 2024-07-25 RX ADMIN — CARVEDILOL 3.12 MG: 3.12 TABLET, FILM COATED ORAL at 17:56

## 2024-07-25 RX ADMIN — ACETAMINOPHEN 650 MG: 325 TABLET ORAL at 18:37

## 2024-07-25 RX ADMIN — Medication 1 TABLET: at 08:40

## 2024-07-25 RX ADMIN — OXYCODONE HYDROCHLORIDE 5 MG: 5 TABLET ORAL at 08:39

## 2024-07-25 RX ADMIN — CARVEDILOL 3.12 MG: 3.12 TABLET, FILM COATED ORAL at 08:41

## 2024-07-25 RX ADMIN — ATORVASTATIN CALCIUM 10 MG: 10 TABLET, FILM COATED ORAL at 20:25

## 2024-07-25 RX ADMIN — SODIUM CHLORIDE, PRESERVATIVE FREE 10 ML: 5 INJECTION INTRAVENOUS at 08:40

## 2024-07-25 RX ADMIN — MAGNESIUM HYDROXIDE 30 ML: 400 SUSPENSION ORAL at 20:25

## 2024-07-25 RX ADMIN — SPIRONOLACTONE 50 MG: 25 TABLET ORAL at 11:19

## 2024-07-25 RX ADMIN — OXYCODONE HYDROCHLORIDE 5 MG: 5 TABLET ORAL at 12:53

## 2024-07-25 RX ADMIN — Medication 2000 MG: at 03:27

## 2024-07-25 RX ADMIN — SODIUM CHLORIDE, PRESERVATIVE FREE 10 ML: 5 INJECTION INTRAVENOUS at 20:29

## 2024-07-25 RX ADMIN — SENNOSIDES 8.6 MG: 8.6 TABLET, FILM COATED ORAL at 08:44

## 2024-07-25 ASSESSMENT — PAIN SCALES - GENERAL
PAINLEVEL_OUTOF10: 6
PAINLEVEL_OUTOF10: 6
PAINLEVEL_OUTOF10: 3
PAINLEVEL_OUTOF10: 5
PAINLEVEL_OUTOF10: 4
PAINLEVEL_OUTOF10: 0
PAINLEVEL_OUTOF10: 3
PAINLEVEL_OUTOF10: 6

## 2024-07-25 ASSESSMENT — PAIN DESCRIPTION - ORIENTATION
ORIENTATION: LOWER

## 2024-07-25 ASSESSMENT — PAIN DESCRIPTION - LOCATION
LOCATION: BACK

## 2024-07-25 ASSESSMENT — PAIN DESCRIPTION - DESCRIPTORS: DESCRIPTORS: DULL;ACHING

## 2024-07-25 NOTE — PROGRESS NOTES
Surgical Progress Note    POD: 1    Patient doing fairly well  Vitals:    24 1808   BP: 132/66   Pulse: 98   Resp: 16   Temp: (!) 102.2 °F (39 °C)   SpO2: 94%      Temp (24hrs), Av.2 °F (37.3 °C), Min:98.1 °F (36.7 °C), Max:102.2 °F (39 °C)       Pain Control leg feel less numb  No unusual nausea    Exam: fair with PT        Lungs:  No respiratory distress    Labs reviewed:  Labs:                I/O last 3 completed shifts:  In: 1360 [I.V.:1300; IV Piggyback:60]  Out: 400 [Blood:400]    Assessment:    Patient Active Problem List   Diagnosis    Asthma    Cervical disc disease    Esophageal reflux    Fatigue    Hip joint stiffness    Hyperlipidemia    Essential hypertension    Hypokalemia    Joint pain, hip    Lower back pain    Muscle spasm    Nasal congestion    Neck pain    Osteoarthritis of hip    Osteoarthritis of neck    Sacroiliac strain    Scoliosis    Shortness of breath    Snoring    Tachycardia    Urinary incontinence    Weight gain    Acute internal derangement of right knee    BMI 36.0-36.9,adult    Tubular adenoma of colon    Chronic vulvitis    Overactive bladder    Postmenopausal atrophic vaginitis    Chronic renal disease, stage III ([facility]) [055957]    Severe obesity (BMI 35.0-39.9) with comorbidity ([facility])    Acquired spondylolisthesis    Spinal stenosis of lumbar region with neurogenic claudication       Plan:  See my orders    Noam Vallejo MD MD  2024 6:19 PM

## 2024-07-25 NOTE — PLAN OF CARE
Problem: Discharge Planning  Goal: Discharge to home or other facility with appropriate resources  Outcome: Progressing  Flowsheets  Taken 7/25/2024 0417 by Kenya Dobson RN  Discharge to home or other facility with appropriate resources:   Identify barriers to discharge with patient and caregiver   Arrange for needed discharge resources and transportation as appropriate   Identify discharge learning needs (meds, wound care, etc)   Arrange for interpreters to assist at discharge as needed   Refer to discharge planning if patient needs post-hospital services based on physician order or complex needs related to functional status, cognitive ability or social support system  Taken 7/24/2024 1641 by Anastasiia Parker RN  Discharge to home or other facility with appropriate resources: Identify barriers to discharge with patient and caregiver     Problem: Pain  Goal: Verbalizes/displays adequate comfort level or baseline comfort level  Outcome: Progressing  Flowsheets (Taken 7/25/2024 0417)  Verbalizes/displays adequate comfort level or baseline comfort level:   Encourage patient to monitor pain and request assistance   Assess pain using appropriate pain scale   Administer analgesics based on type and severity of pain and evaluate response   Implement non-pharmacological measures as appropriate and evaluate response   Consider cultural and social influences on pain and pain management   Notify Licensed Independent Practitioner if interventions unsuccessful or patient reports new pain     Problem: Safety - Adult  Goal: Free from fall injury  Outcome: Progressing  Flowsheets (Taken 7/25/2024 0417)  Free From Fall Injury: Instruct family/caregiver on patient safety

## 2024-07-25 NOTE — ACP (ADVANCE CARE PLANNING)
Advance Care Planning     Advance Care Planning Activator (Inpatient)  Conversation Note      Date of ACP Conversation: 7/25/2024     Conversation Conducted with: Patient with Decision Making Capacity    ACP Activator: Greer Marte RN      Health Care Decision Maker:     Current Designated Health Care Decision Maker:     Primary Decision Maker: Chance Lomax - Spouse - [phone]    Today we documented Decision Maker(s) consistent with Legal Next of Kin hierarchy.    Care Preferences    Ventilation:  \"If you were in your present state of health and suddenly became very ill and were unable to breathe on your own, what would your preference be about the use of a ventilator (breathing machine) if it were available to you?\"      Would the patient desire the use of ventilator (breathing machine)?: yes    \"If your health worsens and it becomes clear that your chance of recovery is unlikely, what would your preference be about the use of a ventilator (breathing machine) if it were available to you?\"     Would the patient desire the use of ventilator (breathing machine)?: Yes      Resuscitation  \"CPR works best to restart the heart when there is a sudden event, like a heart attack, in someone who is otherwise healthy. Unfortunately, CPR does not typically restart the heart for people who have serious health conditions or who are very sick.\"    \"In the event your heart stopped as a result of an underlying serious health condition, would you want attempts to be made to restart your heart (answer \"yes\" for attempt to resuscitate) or would you prefer a natural death (answer \"no\" for do not attempt to resuscitate)?\" yes       [] Yes   [] No   Educated Patient / Decision Maker regarding differences between Advance Directives and portable DNR orders.    Length of ACP Conversation in minutes:      Conversation Outcomes:  ACP discussion completed    Follow-up plan:    [] Schedule follow-up conversation to continue

## 2024-07-25 NOTE — PROGRESS NOTES
and lumbar fusion (N/A, 7/24/2024).    Treatment Diagnosis: Impaired self-care status      Assessment   Performance deficits / Impairments: Decreased functional mobility ;Decreased ADL status;Decreased ROM;Decreased strength;Decreased endurance;Decreased balance;Decreased high-level IADLs  Assessment: Pt presents with decreased ADL/IADL IND secodnary to deficits noted above. At baseline, pt is IND with ADLs, IADLs, and functional mobility without AD. Currently, pt is requiring CGA for functional mobility, transfers, and CGA-MIN A for LB ADLs/toileting. Continued OT services are recommended while hospitalized and upon d/c to maximize IND and safety to facilitate return to PLOF. . Pt displays the functional abilities to safely return home with  as  is able to provide 24/7.  Treatment Diagnosis: Impaired self-care status  Prognosis: Good  Decision Making: Medium Complexity  REQUIRES OT FOLLOW-UP: Yes  Activity Tolerance  Activity Tolerance: Patient Tolerated treatment well;Patient limited by pain        Plan   Occupational Therapy Plan  Times Per Week: 5-7  Current Treatment Recommendations: Strengthening, ROM, Balance training, Functional mobility training, Endurance training, Pain management, Safety education & training, Patient/Caregiver education & training, Equipment evaluation, education, & procurement, Positioning, Self-Care / ADL, Home management training     Restrictions  Restrictions/Precautions  Restrictions/Precautions: Surgical Protocols, Fall Risk, Up as Tolerated  Required Braces or Orthoses?: No  Implants present? : Metal implants (bilat THAs, bladder stimulator)  Position Activity Restriction  Other position/activity restrictions: activity as tolerated, ambulate pt    Safety Devices  Type of Devices: All fall risk precautions in place;Bed alarm in place;Call light within reach;Gait belt;Left in bed ( present with pt)  Restraints  Restraints Initially in Place: No    Subjective  CK         Goals  Short Term Goals  Time Frame for Short Term Goals: By discharge, pt will  Short Term Goal 1: demo ability to perform LB ADLs with MOD I with use of AE/compensatory techniques in order to return to PLOF.  Short Term Goal 2: demo functional transfers/functional mobility with MOD I with use of LRAD PRN in order to incresae ADL/IADL IND and decrease fall risk.  Short Term Goal 3: demo 10+ minutes of static/dynamic standing with unilateral/no UE support in order to increase ADL/IADL IND.  Short Term Goal 4: participate in 15+ minutes of therapeutic exercises/therapeutic activites in order to increase activity tolerance for functional tasks.  Short Term Goal 5: demo item retrieval/transport with use of LRAD and AE PRN with MOD I in order to return to increase ADL/IADL IND.       Therapy Time   Individual Concurrent Group Co-treatment   Time In 0912         Time Out 0946         Minutes 34         Timed Code Treatment Minutes: 21 Minutes       Alia Guerin, OT

## 2024-07-25 NOTE — PROGRESS NOTES
Physical Therapy  Facility/Department: [facility] MED SURG  Physical Therapy Initial Assessment    Name: Muna Lomax  : 1950  MRN: [id number]  Date of Service: 2024    Discharge Recommendations:  Continue to assess pending progress   PT Equipment Recommendations  Equipment Needed:  (RW)      Patient Diagnosis(es): There were no encounter diagnoses.  Past Medical History:  has a past medical history of Arthritis, Asthma, Bronchitis, Constipation, GERD (gastroesophageal reflux disease), Hyperlipidemia, Hypertension, Hypokalemia, Overactive bladder, Tachycardia, and Wears glasses.  Past Surgical History:  has a past surgical history that includes back surgery ( and ); Bladder surgery (, ); Tubal ligation; Total hip arthroplasty (Bilateral, , ); cyst removal (2017); Colonoscopy (); Colonoscopy (N/A, 2018); Cataract removal with implant (Bilateral); pr arthroscopy knee diagnostic w/wo synovial bx spx (Right, 10/4/2018); Stimulator Surgery (N/A, 10/11/2021); Stimulator Surgery (N/A, 10/11/2021); Stimulator Surgery (10/25/2021); Stimulator Surgery (N/A, 10/25/2021); Knee arthroscopy (Right); Colonoscopy (N/A, 2021); and lumbar fusion (N/A, 2024).    Assessment   Body Structures, Functions, Activity Limitations Requiring Skilled Therapeutic Intervention: Decreased functional mobility ;Decreased strength;Increased pain  Assessment: Pt plesant and agreable to session, is progressing well with functional mobilitly  Treatment Diagnosis: Impaired mobilitly due to status post LB elective surgery  Specific Instructions for Next Treatment: Increase gait distance using RW, instruct spouce in proper guarding techniques for stair ambulation  Therapy Prognosis: Excellent  Decision Making: Medium Complexity  History: Pt admitted for elective LB surgery  Exam: MMT, ROM, funtional mobilitly  Clinical Presentation: Pt required supervision for log rolling, min A 1 for sit to supine, CGA

## 2024-07-25 NOTE — CARE COORDINATION
Case Management Assessment  Initial Evaluation    Date/Time of Evaluation: 7/25/2024 11:38 AM  Assessment Completed by: Greer Marte RN    If patient is discharged prior to next notation, then this note serves as note for discharge by case management.    Patient Name: Muna Lomax                   YOB: 1950  Diagnosis: Spondylolisthesis of lumbar region [M43.16]  Acquired spondylolisthesis [M43.10]                   Date / Time: 7/24/2024  5:38 AM    Patient Admission Status: Inpatient   Readmission Risk (Low < 19, Mod (19-27), High > 27): Readmission Risk Score: 12.5    Current PCP: Abbey Plasencia MD  PCP verified by CM? Yes    Chart Reviewed: Yes      History Provided by: Patient  Patient Orientation: Alert and Oriented    Patient Cognition: Alert    Hospitalization in the last 30 days (Readmission):  No    If yes, Readmission Assessment in CM Navigator will be completed.    Advance Directives:      Code Status: Full Code   Patient's Primary Decision Maker is: Legal Next of Kin    Primary Decision Maker: Chance Lomax - Spouse - [phone]    Discharge Planning:    Patient lives with: Spouse/Significant Other Type of Home: House  Primary Care Giver: Self  Patient Support Systems include: Spouse/Significant Other   Current Financial resources: Medicare  Current community resources: None  Current services prior to admission: Durable Medical Equipment            Current DME: Shower Chair, Bedside Commode, Walker            Type of Home Care services:  None    ADLS  Prior functional level: Independent in ADLs/IADLs  Current functional level:      PT AM-PAC:   /24  OT AM-PAC: 19 /24    Family can provide assistance at DC: Yes  Would you like Case Management to discuss the discharge plan with any other family members/significant others, and if so, who? No  Plans to Return to Present Housing: Yes  Other Identified Issues/Barriers to RETURNING to current housing: stairs to bed/bath  Potential

## 2024-07-25 NOTE — PLAN OF CARE
Problem: Discharge Planning  Goal: Discharge to home or other facility with appropriate resources  7/25/2024 1407 by Kimberlyn Sarkar RN  Outcome: Progressing  7/25/2024 0417 by Kenya Dobson RN  Outcome: Progressing  Flowsheets  Taken 7/25/2024 0417 by Kenya Dobson RN  Discharge to home or other facility with appropriate resources:   Identify barriers to discharge with patient and caregiver   Arrange for needed discharge resources and transportation as appropriate   Identify discharge learning needs (meds, wound care, etc)   Arrange for interpreters to assist at discharge as needed   Refer to discharge planning if patient needs post-hospital services based on physician order or complex needs related to functional status, cognitive ability or social support system  Taken 7/24/2024 1641 by Anastasiia Parker RN  Discharge to home or other facility with appropriate resources: Identify barriers to discharge with patient and caregiver     Problem: Pain  Goal: Verbalizes/displays adequate comfort level or baseline comfort level  7/25/2024 1407 by Kimberlyn Sarkar RN  Outcome: Progressing  7/25/2024 0417 by Kenya Dobson RN  Outcome: Progressing  Flowsheets (Taken 7/25/2024 0417)  Verbalizes/displays adequate comfort level or baseline comfort level:   Encourage patient to monitor pain and request assistance   Assess pain using appropriate pain scale   Administer analgesics based on type and severity of pain and evaluate response   Implement non-pharmacological measures as appropriate and evaluate response   Consider cultural and social influences on pain and pain management   Notify Licensed Independent Practitioner if interventions unsuccessful or patient reports new pain     Problem: Safety - Adult  Goal: Free from fall injury  7/25/2024 1407 by Kimberlyn Sarkar RN  Outcome: Progressing  7/25/2024 0417 by Kenya Dobson RN  Outcome: Progressing  Flowsheets

## 2024-07-26 PROCEDURE — 6370000000 HC RX 637 (ALT 250 FOR IP): Performed by: ORTHOPAEDIC SURGERY

## 2024-07-26 PROCEDURE — 99024 POSTOP FOLLOW-UP VISIT: CPT | Performed by: ORTHOPAEDIC SURGERY

## 2024-07-26 PROCEDURE — 2580000003 HC RX 258: Performed by: ORTHOPAEDIC SURGERY

## 2024-07-26 PROCEDURE — 1200000000 HC SEMI PRIVATE

## 2024-07-26 PROCEDURE — 97116 GAIT TRAINING THERAPY: CPT

## 2024-07-26 PROCEDURE — 97110 THERAPEUTIC EXERCISES: CPT

## 2024-07-26 PROCEDURE — 97530 THERAPEUTIC ACTIVITIES: CPT

## 2024-07-26 RX ADMIN — CARVEDILOL 3.12 MG: 3.12 TABLET, FILM COATED ORAL at 16:31

## 2024-07-26 RX ADMIN — OXYCODONE HYDROCHLORIDE AND ACETAMINOPHEN 500 MG: 500 TABLET ORAL at 08:57

## 2024-07-26 RX ADMIN — OXYCODONE HYDROCHLORIDE 5 MG: 5 TABLET ORAL at 11:16

## 2024-07-26 RX ADMIN — SODIUM CHLORIDE, PRESERVATIVE FREE 10 ML: 5 INJECTION INTRAVENOUS at 19:55

## 2024-07-26 RX ADMIN — SPIRONOLACTONE 50 MG: 25 TABLET ORAL at 11:16

## 2024-07-26 RX ADMIN — ACETAMINOPHEN 650 MG: 325 TABLET ORAL at 13:33

## 2024-07-26 RX ADMIN — CLONIDINE HYDROCHLORIDE 0.3 MG: 0.3 TABLET ORAL at 19:54

## 2024-07-26 RX ADMIN — ATORVASTATIN CALCIUM 10 MG: 10 TABLET, FILM COATED ORAL at 19:54

## 2024-07-26 RX ADMIN — OXYCODONE HYDROCHLORIDE 10 MG: 5 TABLET ORAL at 21:02

## 2024-07-26 RX ADMIN — SENNOSIDES 8.6 MG: 8.6 TABLET, FILM COATED ORAL at 08:57

## 2024-07-26 RX ADMIN — Medication 1 TABLET: at 08:57

## 2024-07-26 RX ADMIN — OXYCODONE HYDROCHLORIDE 5 MG: 5 TABLET ORAL at 16:31

## 2024-07-26 RX ADMIN — OXYCODONE HYDROCHLORIDE 10 MG: 5 TABLET ORAL at 06:27

## 2024-07-26 RX ADMIN — CARVEDILOL 3.12 MG: 3.12 TABLET, FILM COATED ORAL at 08:57

## 2024-07-26 RX ADMIN — SODIUM CHLORIDE, PRESERVATIVE FREE 10 ML: 5 INJECTION INTRAVENOUS at 08:59

## 2024-07-26 RX ADMIN — PANTOPRAZOLE SODIUM 40 MG: 40 TABLET, DELAYED RELEASE ORAL at 06:27

## 2024-07-26 ASSESSMENT — PAIN DESCRIPTION - ORIENTATION
ORIENTATION: LOWER
ORIENTATION: LOWER

## 2024-07-26 ASSESSMENT — PAIN SCALES - GENERAL
PAINLEVEL_OUTOF10: 5
PAINLEVEL_OUTOF10: 5
PAINLEVEL_OUTOF10: 7
PAINLEVEL_OUTOF10: 7
PAINLEVEL_OUTOF10: 5

## 2024-07-26 ASSESSMENT — PAIN DESCRIPTION - DESCRIPTORS
DESCRIPTORS: TIGHTNESS
DESCRIPTORS: SHARP

## 2024-07-26 ASSESSMENT — PAIN DESCRIPTION - LOCATION
LOCATION: BACK
LOCATION: BACK

## 2024-07-26 NOTE — PLAN OF CARE
Problem: Discharge Planning  Goal: Discharge to home or other facility with appropriate resources  7/26/2024 0405 by Kenya Dobson RN  Outcome: Progressing  Flowsheets (Taken 7/26/2024 0405)  Discharge to home or other facility with appropriate resources:   Identify barriers to discharge with patient and caregiver   Arrange for needed discharge resources and transportation as appropriate   Identify discharge learning needs (meds, wound care, etc)   Arrange for interpreters to assist at discharge as needed   Refer to discharge planning if patient needs post-hospital services based on physician order or complex needs related to functional status, cognitive ability or social support system  7/25/2024 1407 by Kimberlyn Sarkar RN  Outcome: Progressing     Problem: Pain  Goal: Verbalizes/displays adequate comfort level or baseline comfort level  7/26/2024 0405 by Kenya Dobson RN  Outcome: Progressing  Flowsheets (Taken 7/26/2024 0405)  Verbalizes/displays adequate comfort level or baseline comfort level:   Encourage patient to monitor pain and request assistance   Assess pain using appropriate pain scale   Administer analgesics based on type and severity of pain and evaluate response   Implement non-pharmacological measures as appropriate and evaluate response   Consider cultural and social influences on pain and pain management  7/25/2024 1407 by Kimberlyn Sarkar RN  Outcome: Progressing     Problem: Safety - Adult  Goal: Free from fall injury  7/26/2024 0405 by Kenya Dobson RN  Outcome: Progressing  Flowsheets (Taken 7/26/2024 0405)  Free From Fall Injury: Instruct family/caregiver on patient safety  7/25/2024 1407 by Kimberlyn Sarkar RN  Outcome: Progressing

## 2024-07-26 NOTE — PROGRESS NOTES
Surgical Progress Note    POD: 2    Patient doing fairly well  Vitals:    24 1631   BP:    Pulse:    Resp:    Temp: 98.7 °F (37.1 °C)   SpO2:       Temp (24hrs), Av.6 °F (37.6 °C), Min:98.4 °F (36.9 °C), Max:102.2 °F (39 °C)       Pain Control fair  No unusual nausea    Exam: slow progress with PT        Lungs:  No respiratory distress    Labs reviewed:  Labs:                No intake/output data recorded.    Assessment:    Patient Active Problem List   Diagnosis    Asthma    Cervical disc disease    Esophageal reflux    Fatigue    Hip joint stiffness    Hyperlipidemia    Essential hypertension    Hypokalemia    Joint pain, hip    Lower back pain    Muscle spasm    Nasal congestion    Neck pain    Osteoarthritis of hip    Osteoarthritis of neck    Sacroiliac strain    Scoliosis    Shortness of breath    Snoring    Tachycardia    Urinary incontinence    Weight gain    Acute internal derangement of right knee    BMI 36.0-36.9,adult    Tubular adenoma of colon    Chronic vulvitis    Overactive bladder    Postmenopausal atrophic vaginitis    Chronic renal disease, stage III (HCC) [127650]    Severe obesity (BMI 35.0-39.9) with comorbidity (HCC)    Acquired spondylolisthesis    Spinal stenosis of lumbar region with neurogenic claudication       Plan:  See my orders    Low grade fever/mild wound drainage    Dc tomorrow      Noam Vallejo MD MD  2024 4:57 PM

## 2024-07-26 NOTE — CARE COORDINATION
ONGOING DISCHARGE PLAN:    Patient is alert and oriented x4.    Spoke with patient regarding discharge plan and patient confirms that plan is still home. Denies needs VNS. Will start PT outpatient at discharge.     POD #2 L4-S1 PLIF     PT/OT    Will continue to follow for additional discharge needs.    If patient is discharged prior to next notation, then this note serves as note for discharge by case management.    Electronically signed by Greer Marte RN on 7/26/2024 at 12:18 PM

## 2024-07-26 NOTE — PROGRESS NOTES
Physical Therapy  Rehabilitation Physical Therapy    Date: 2024  Patient Name: Muna Lomax      Room:   MRN: [id number]    : 1950  (74 y.o.)  Gender: female     Referring Practitioner: Dr. BREA Vallejo  Diagnosis: spondyolisthesis of lumbar region    Discharge Recommendations:  Continue to assess pending progress  Equipment Needed:  (RW)    Assessment  Assessment  Activity Tolerance: Patient limited by pain  Discharge Recommendations: Continue to assess pending progress    Plan  Physical Therapy Plan  General Plan:  (Daily x 3 days)  Specific Instructions for Next Treatment: Increase gait distance using RW, instruct spouce in proper guarding techniques for stair ambulation  Current Treatment Recommendations: Balance training, Functional mobility training, Transfer training, Gait training, Stair training, Safety education & training, Patient/Caregiver education & training, Equipment evaluation, education, & procurement, Positioning     Restrictions  Restrictions/Precautions: Surgical Protocols, Fall Risk, Up as Tolerated  Implants present? : Metal implants (bilat THAs, bladder stimulator)  Other position/activity restrictions: activity as tolerated, ambulate pt    Subjective  Subjective  Subjective: Patient resting in bed upon approach with  bedside. Patient agreeable to PT intervention at this time. Patient reporting feeling \"warm\" as if she ahs a fever. Communicated patient complaints eith DEBBIE Heller RN responding promptly to take patient's temp then providing tylenol to decrease fever. Per  Dr Vallejo wants patient to stsy another night, patient is agreeable.  Pain: Denies pain at rest, pain increasing 6/10 with mobility.  Comments: Pt had a PLIF L4S1 on 2024 by Dr. BREA Vallejo.   Overall Cognitive Status: WFL  Overall Orientation Status: Within Functional Limits  Orientation Level: Oriented to place, Oriented to time, Oriented to person, Oriented to situation    Vitals  Temp: (!)  of Stairs Trained: 17 (6\" & 4\" demonstrating step-to pattern & slow cadance)    Balance  Sitting: Intact  Standing: With support (@ RW)     PT Exercises  Exercise Treatment: functional mobility; see above  A/AROM Exercises: heel slides to tolerance  Circulation/Endurance Exercises: stair negotiation  Pressure Relief Exercises: self adjsuts  Functional Mobility Circuit Training: transfers from variable surface  Static Sitting Balance Exercises: sat trunk unsupported, feet on floor ~5-6 min x2 reps  Dynamic Sitting Balance Exercises: scotoing, weight shifting, postural ex  Static Standing Balance Exercises: standing with bilat UE support ~7-8 min x2 reps, ~3-4 min, ~4-5 min  Dynamic Standing Balance Exercises: weight shfiting, postural ex, stabilization ex, lower body clothing managment with writer pulling brief up to mid thigh area with unilateal UE support, CGA  Postural Correction Exercises: vcs for forward gaze  Motor Control/Coordination: vcs for increased step length to normalize gait  Disease-specific Exercises: education provided on surgical protocol inclusing limit bending, lifting, twisting     Education  Education Given To: Patient;Family ()  Education Provided: Plan of Care;Fall Prevention Strategies;Energy Conservation;Transfer Training;Precautions  Education Provided Comments: provided to  assisting patient with transfers & bed mobillity  Education Method: Demonstration;Verbal  Barriers to Learning: None  Education Outcome: Verbalized understanding;Continued education needed;Demonstrated understanding    Goals  Patient Goals   Patient Goals : To retuen home with spouse  Short Term Goals  Time Frame for Short Term Goals: Daily x 3 days  Short Term Goal 1: Pt to demonstrate log rolling in bed independently for position change  Short Term Goal 2: Pt to demonstrate transfers in and out of bed with supervision  Short Term Goal 3: Pt to demonstrate STS transfers MOD I using RW  Short Term Goal 4:

## 2024-07-27 VITALS
DIASTOLIC BLOOD PRESSURE: 71 MMHG | SYSTOLIC BLOOD PRESSURE: 127 MMHG | HEIGHT: 65 IN | OXYGEN SATURATION: 95 % | BODY MASS INDEX: 33.99 KG/M2 | WEIGHT: 204 LBS | TEMPERATURE: 100.8 F | RESPIRATION RATE: 14 BRPM | HEART RATE: 74 BPM

## 2024-07-27 PROCEDURE — 2580000003 HC RX 258: Performed by: ORTHOPAEDIC SURGERY

## 2024-07-27 PROCEDURE — 99024 POSTOP FOLLOW-UP VISIT: CPT | Performed by: ORTHOPAEDIC SURGERY

## 2024-07-27 PROCEDURE — 6370000000 HC RX 637 (ALT 250 FOR IP): Performed by: ORTHOPAEDIC SURGERY

## 2024-07-27 RX ADMIN — OXYCODONE HYDROCHLORIDE 5 MG: 5 TABLET ORAL at 09:51

## 2024-07-27 RX ADMIN — SENNOSIDES 8.6 MG: 8.6 TABLET, FILM COATED ORAL at 09:52

## 2024-07-27 RX ADMIN — SPIRONOLACTONE 50 MG: 25 TABLET ORAL at 14:15

## 2024-07-27 RX ADMIN — ACETAMINOPHEN 650 MG: 325 TABLET ORAL at 14:25

## 2024-07-27 RX ADMIN — OXYCODONE HYDROCHLORIDE AND ACETAMINOPHEN 500 MG: 500 TABLET ORAL at 09:52

## 2024-07-27 RX ADMIN — OXYCODONE HYDROCHLORIDE 10 MG: 5 TABLET ORAL at 03:35

## 2024-07-27 RX ADMIN — Medication 1 TABLET: at 09:52

## 2024-07-27 RX ADMIN — OXYCODONE HYDROCHLORIDE 5 MG: 5 TABLET ORAL at 15:01

## 2024-07-27 RX ADMIN — SODIUM CHLORIDE, PRESERVATIVE FREE 10 ML: 5 INJECTION INTRAVENOUS at 09:52

## 2024-07-27 RX ADMIN — PANTOPRAZOLE SODIUM 40 MG: 40 TABLET, DELAYED RELEASE ORAL at 06:24

## 2024-07-27 RX ADMIN — CARVEDILOL 3.12 MG: 3.12 TABLET, FILM COATED ORAL at 09:52

## 2024-07-27 ASSESSMENT — PAIN SCALES - GENERAL
PAINLEVEL_OUTOF10: 0
PAINLEVEL_OUTOF10: 4
PAINLEVEL_OUTOF10: 5
PAINLEVEL_OUTOF10: 7
PAINLEVEL_OUTOF10: 5

## 2024-07-27 ASSESSMENT — PAIN DESCRIPTION - DESCRIPTORS: DESCRIPTORS: THROBBING

## 2024-07-27 ASSESSMENT — PAIN DESCRIPTION - LOCATION
LOCATION: BACK;BUTTOCKS
LOCATION: BACK

## 2024-07-27 ASSESSMENT — PAIN DESCRIPTION - ORIENTATION
ORIENTATION: LOWER
ORIENTATION: LOWER

## 2024-07-27 NOTE — DISCHARGE SUMMARY
Discharge Summary    Attending Physician: Noam Vallejo MD  Admit Date: 7/24/2024  Discharge Date:    Primary Care Physician: Abbey Plasencia MD    Admitting Diagnosis:  Principal Problem:    Acquired spondylolisthesis  Active Problems:    Spinal stenosis of lumbar region with neurogenic claudication  Resolved Problems:    * No resolved hospital problems. *        Discharge Diagnoses:  Principal Problem:    Acquired spondylolisthesis  Active Problems:    Spinal stenosis of lumbar region with neurogenic claudication  Resolved Problems:    * No resolved hospital problems. *         Past Medical History:   Diagnosis Date    Arthritis     Asthma     \"winter\" asthma    Bronchitis     Constipation     GERD (gastroesophageal reflux disease)     Hyperlipidemia     Hypertension     Hypokalemia     Overactive bladder     Tachycardia     \"racing heart\"    Wears glasses        Procedures Performed and Findings  Procedure(s) with comments:  POSTERIOR DECOMPRESSION  INSTRUMENTED FUSION L4-S1 - K2M / EVOKES     Consultations Obtained  IP CONSULT TO PRIMARY CARE PROVIDER    Hospital Course  Uncomplicated    Some not unexpected fevers for 2 days    Discharge Medications       Medication List        START taking these medications      oxyCODONE-acetaminophen 5-325 MG per tablet  Commonly known as: Percocet  Take 1 tablet by mouth every 6 hours as needed for Pain for up to 7 days. Intended supply: 7 days. Take lowest dose possible to manage pain Max Daily Amount: 4 tablets            CONTINUE taking these medications      aspirin 81 MG EC tablet     CALCIUM CITRATE + D PO     carvedilol 3.125 MG tablet  Commonly known as: COREG  Take 1 tablet by mouth 2 times daily     clindamycin 300 MG capsule  Commonly known as: CLEOCIN     cloNIDine 0.3 MG tablet  Commonly known as: CATAPRES  TAKE ONE TABLET BY MOUTH ONCE NIGHTLY     Co Q 10 100 MG Caps     Cranberry 200 MG Caps     esomeprazole 20 MG delayed release capsule  Commonly known

## 2024-07-27 NOTE — CARE COORDINATION
ONGOING DISCHARGE PLAN:    Patient is alert and oriented x4.    Spoke with patient regarding discharge plan and patient confirms that plan is still home. Denies needs VNS. Will start PT outpatient at discharge.     POD #3 L4-S1 PLIF     PT/OT    IMM letter provided to patient.  Patient offered four hours to make informed decision regarding appeal process; patient agreeable to discharge.      Will continue to follow for additional discharge needs.    If patient is discharged prior to next notation, then this note serves as note for discharge by case management.    Electronically signed by Greer Marte RN on 7/27/2024 at 8:29 AM

## 2024-07-27 NOTE — PLAN OF CARE
Problem: Discharge Planning  Goal: Discharge to home or other facility with appropriate resources  Outcome: Progressing  Flowsheets (Taken 7/27/2024 0439)  Discharge to home or other facility with appropriate resources:   Identify barriers to discharge with patient and caregiver   Arrange for needed discharge resources and transportation as appropriate   Identify discharge learning needs (meds, wound care, etc)   Arrange for interpreters to assist at discharge as needed   Refer to discharge planning if patient needs post-hospital services based on physician order or complex needs related to functional status, cognitive ability or social support system     Problem: Pain  Goal: Verbalizes/displays adequate comfort level or baseline comfort level  7/27/2024 0439 by Kenya Dobson, RN  Outcome: Progressing  Flowsheets (Taken 7/27/2024 0439)  Verbalizes/displays adequate comfort level or baseline comfort level:   Encourage patient to monitor pain and request assistance   Assess pain using appropriate pain scale   Administer analgesics based on type and severity of pain and evaluate response   Implement non-pharmacological measures as appropriate and evaluate response   Consider cultural and social influences on pain and pain management  7/26/2024 1828 by Arron Brooks, RN  Outcome: Progressing  Note: Assess the location, characteristics, onset, duration, frequency, quality, and severity of pain. Encourage immediate report of pain. Use appropriate pain scale to rate pain. Manage pain using nonpharmacologic/pharmacologic interventions.      Problem: Safety - Adult  Goal: Free from fall injury  7/27/2024 0439 by Kenya Dobson, RN  Outcome: Progressing  Flowsheets (Taken 7/27/2024 0439)  Free From Fall Injury: Instruct family/caregiver on patient safety  7/26/2024 1828 by Arron Brooks, RN  Outcome: Progressing  Note: Patient remains free of falls and injuries throughout shift. Bed remains in the  lowest position, wheels locked, call light and bedside table are within reach.

## 2024-07-27 NOTE — PLAN OF CARE
Problem: Discharge Planning  Goal: Discharge to home or other facility with appropriate resources  7/27/2024 1425 by Jessi Peñaloza RN  Outcome: Adequate for Discharge  7/27/2024 0439 by Kenya Dobson RN  Outcome: Progressing  Flowsheets (Taken 7/27/2024 0439)  Discharge to home or other facility with appropriate resources:   Identify barriers to discharge with patient and caregiver   Arrange for needed discharge resources and transportation as appropriate   Identify discharge learning needs (meds, wound care, etc)   Arrange for interpreters to assist at discharge as needed   Refer to discharge planning if patient needs post-hospital services based on physician order or complex needs related to functional status, cognitive ability or social support system     Problem: Pain  Goal: Verbalizes/displays adequate comfort level or baseline comfort level  7/27/2024 1425 by Jessi Peñaloza RN  Outcome: Adequate for Discharge  7/27/2024 0439 by Kenya Dobson RN  Outcome: Progressing  Flowsheets (Taken 7/27/2024 0439)  Verbalizes/displays adequate comfort level or baseline comfort level:   Encourage patient to monitor pain and request assistance   Assess pain using appropriate pain scale   Administer analgesics based on type and severity of pain and evaluate response   Implement non-pharmacological measures as appropriate and evaluate response   Consider cultural and social influences on pain and pain management     Problem: Safety - Adult  Goal: Free from fall injury  7/27/2024 1425 by Jessi Peñaloza RN  Outcome: Adequate for Discharge  7/27/2024 0439 by Kenya Dobson RN  Outcome: Progressing  Flowsheets (Taken 7/27/2024 0439)  Free From Fall Injury: Instruct family/caregiver on patient safety     Problem: ABCDS Injury Assessment  Goal: Absence of physical injury  Outcome: Adequate for Discharge

## 2024-07-29 ENCOUNTER — CARE COORDINATION (OUTPATIENT)
Dept: CARE COORDINATION | Age: 74
End: 2024-07-29

## 2024-07-29 ENCOUNTER — TELEPHONE (OUTPATIENT)
Dept: PRIMARY CARE CLINIC | Age: 74
End: 2024-07-29

## 2024-07-29 NOTE — CARE COORDINATION
Pt returned writer's call . Writer informed pt take Miralax  with stool softer and wean down pain medications. She verbalized understanding and stated that she has already started to use Miralax a with stool softer.   Twyla Ojeda LPN   Care Transitions Nurse  Cell

## 2024-07-29 NOTE — TELEPHONE ENCOUNTER
It is likely constipation from the pain meds so if she can start weaning those, it will help.  She can start Miralax daily and take a stool softener with that.

## 2024-07-29 NOTE — CARE COORDINATION
Writer left HIPAA compliant  requesting a return call.   Twyla Ojeda LPN   Care Transitions Nurse  Cell

## 2024-07-29 NOTE — CARE COORDINATION
Care Transitions Note    Initial Call - Call within 2 business days of discharge: Yes    Patient Current Location:  Home: [address] [place], [place] [id number]    LPN Care Coordinator contacted the patient by telephone to perform post hospital discharge assessment, verified name and  as identifiers. Provided introduction to self, and explanation of the LPN Care Coordinator role.     Patient: Muna Lomax    Patient : 1950   MRN: [id number]    Reason for Admission: Acquired spondylolisthesis   Discharge Date: 24  RURS: Readmission Risk Score: 13.2      Last Discharge Facility       Date Complaint Diagnosis Description Type Department Provider    24  Spinal stenosis of lumbar region with neurogenic claudication Admission (Discharged) Noam Varghese MD            Was this an external facility discharge? No    Additional needs identified to be addressed with provider   What else can she take for constipation taking stool softeners dulcolax bid prune juice stated she has not had a BM since last Tuesday.             Method of communication with provider: chart routing.    Patients top risk factors for readmission: medical condition-Acquired spondylolisthesis     Interventions to address risk factors:   Education: s/s of infections IS use healthy diet warm compress to stimulate bowels   Review of patient management of conditions/medications: Acquired spondylolisthesis     Care Summary Note: Writer spoke to Muna today for initial transitional care call. She os S/P posterior decompression. She reports that she Is getting stronger every day she uses a cane to ambulate. Her  is in the background he assists with her cane. Pt is unsure what she did with d/c paperwork. She requested number to [facility]  and stated if they can not find paperwork her  will go get a copy. Writer discussed AVS with pt stopped medications ibuprofen.Med rec completed . She has oxycodone and is

## 2024-07-29 NOTE — CARE COORDINATION
Sent message to our staff to try Miralax daily with stool softener and start weaning down on the pain meds.

## 2024-07-29 NOTE — TELEPHONE ENCOUNTER
Patient is asking for advise for recent issue with constipation.    Patient states she's not had a bowel movement for 7 days and the last bowel movement was very small. Patient states she's tried dulcolax, senokot, fiber supplements and prune juice.    Patient had a surgery Wednesday last week and she is still taking pain meds from that surgery.    Pharmacy confirmed. Please advise.

## 2024-08-06 ENCOUNTER — OFFICE VISIT (OUTPATIENT)
Dept: ORTHOPEDIC SURGERY | Age: 74
End: 2024-08-06

## 2024-08-06 VITALS — WEIGHT: 204 LBS | BODY MASS INDEX: 34.83 KG/M2 | RESPIRATION RATE: 14 BRPM | HEIGHT: 64 IN

## 2024-08-06 DIAGNOSIS — M48.061 SPINAL STENOSIS OF LUMBAR REGION, UNSPECIFIED WHETHER NEUROGENIC CLAUDICATION PRESENT: Primary | ICD-10-CM

## 2024-08-06 PROCEDURE — 99024 POSTOP FOLLOW-UP VISIT: CPT | Performed by: ORTHOPAEDIC SURGERY

## 2024-08-06 NOTE — PROGRESS NOTES
Patient ID: Muna Lomax is a 74 y.o. female    Chief Compliant:  Chief Complaint   Patient presents with    Lower Back Pain     L4-S1 PLIF DOS 7/24/24        Diagnostic imaging:  AP lateral lumbar spine status post L4 to sacrum PLIF status post bilateral total hips also with a bladder stim stable      Assessment and Plan:  1. Spinal stenosis of lumbar region, unspecified whether neurogenic claudication present      Patient reports about 75 to 80% improvement in preoperative symptoms    2 week post L4-S1 PLIF, 7/24/2024    Staples removed     Incision is healing well. No signs of infection     Follow up 4 weeks    HPI:  This is a 74 y.o. female who presents to the clinic today for 2 week post L4-S1 PLIF, 7/24/2024.     Patient reports she is doing very well, with no complaints. She states she has an adjustable bed that helps tremendously.      Review of Systems   All other systems reviewed and are negative.      Past History:    Current Outpatient Medications:     senna (SENOKOT) 8.6 MG tablet, Take 1 tablet by mouth daily as needed for Constipation, Disp: , Rfl:     carvedilol (COREG) 3.125 MG tablet, Take 1 tablet by mouth 2 times daily, Disp: 180 tablet, Rfl: 4    spironolactone (ALDACTONE) 50 MG tablet, TAKE 1 TABLET BY MOUTH EVERY DAY WITH LUNCH, Disp: 90 tablet, Rfl: 1    triamcinolone (KENALOG) 0.1 % ointment, Apply topically 2 times daily Apply as needed for vulvar itching, Disp: 30 g, Rfl: 3    clindamycin (CLEOCIN) 300 MG capsule, Patient will take 6 prior to dentist appointment next week., Disp: , Rfl:     Magnesium Citrate 125 MG CAPS, , Disp: , Rfl:     simvastatin (ZOCOR) 20 MG tablet, TAKE ONE TABLET BY MOUTH DAILY, Disp: 90 tablet, Rfl: 3    cloNIDine (CATAPRES) 0.3 MG tablet, TAKE ONE TABLET BY MOUTH ONCE NIGHTLY, Disp: 90 tablet, Rfl: 3    Cranberry 200 MG CAPS, Take by mouth, Disp: , Rfl:     Handicap Placard MISC, by Does not apply route For orthopedic issues, expires 9/16/2027, Disp: 1 each,

## 2024-08-07 ENCOUNTER — CARE COORDINATION (OUTPATIENT)
Dept: CARE COORDINATION | Age: 74
End: 2024-08-07

## 2024-08-07 NOTE — CARE COORDINATION
Care Transitions Note    Final Call     Patient Current Location:  Home: [address] [place] [address]    Care Transition Nurse contacted the patient by telephone. Verified name and  as identifiers.    Patient closed (declined further [facility] services) from the Care Transitions program on .  Patient/family has the ability to self manage at this time..      Advance Care Planning:   Does patient have an Advance Directive: reviewed during previous call, see note. .    Handoff:   Patient was not referred to the ACM team due to no additional needs identified.       Care Summary Note: Patient returned call, states she is doing well post lumbar surgery.  She had her follow up with Dr. Vallejo yesterday, notes reviewed, no s/s of infection, staples out and she is to return in 4 weeks.  Today, she feels well, states she does not have much pain to her back, has been using Tylenol prn for pain.  She has some numbness and tingling to her toes to both feet, she discussed this with surgeon and he is aware. Discussed that sometimes it takes a while for nerves to come back, sometimes up to a year.  She has issues with constipation, has been taking laxatives and stool softeners.  She states this is a chronic issue for her and not anything new.  She ambulates without a device, states she just is moving slower than her usual.    Patient had her follow up appointment, is moving about without a device and is healing well. She denies any needs.  Offered to outreach to her again next week or if she felt she was doing ok could just call me if she had any further issues.  She declined the need for further calls at this time. Patient does have CTN number and was instructed to call if she had any needs or concerns.     Assessments:  Care Transitions Subsequent and Final Call    Schedule Follow Up Appointment with PCP: Completed  Subsequent and Final Calls  Do you have any ongoing symptoms?: Yes  Onset of Patient-reported symptoms: In the

## 2024-08-07 NOTE — CARE COORDINATION
Care Transitions Note    Follow Up Call     Attempted to reach patient for transitions of care follow up.  Unable to reach patient.      Outreach Attempts:   1st attempt     Care Summary Note: Patient had lumbar surgery with Dr. Vallejo, she attended her post op follow up yesterday, notes reviewed.      Follow Up Appointment:   Future Appointments         Provider Specialty Dept Phone    8/23/2024 11:00 AM Abbey Plasencia MD Primary Care [phone]    8/30/2024 9:30 AM Angel Luis Caal [facility] Pod[facility] [phone]    9/3/2024 9:20 AM Noam Vallejo MD Orthopedic Surgery [phone]    12/3/2024 2:45 PM Maribel Jaramillo MD Cardiology [phone]            Plan for follow-up on next business day.  based on severity of symptoms and risk factors. Plan for next call:  check on pain, any issues with numbness tingling? Bowels moving ok?    Radha Bauer RN

## 2024-08-26 ENCOUNTER — OFFICE VISIT (OUTPATIENT)
Dept: PRIMARY CARE CLINIC | Age: 74
End: 2024-08-26

## 2024-08-26 VITALS
SYSTOLIC BLOOD PRESSURE: 112 MMHG | HEART RATE: 65 BPM | DIASTOLIC BLOOD PRESSURE: 80 MMHG | WEIGHT: 197.4 LBS | BODY MASS INDEX: 33.37 KG/M2 | OXYGEN SATURATION: 95 %

## 2024-08-26 DIAGNOSIS — K21.9 GASTROESOPHAGEAL REFLUX DISEASE, UNSPECIFIED WHETHER ESOPHAGITIS PRESENT: ICD-10-CM

## 2024-08-26 DIAGNOSIS — I10 ESSENTIAL HYPERTENSION: Primary | ICD-10-CM

## 2024-08-26 DIAGNOSIS — E78.2 MIXED HYPERLIPIDEMIA: ICD-10-CM

## 2024-08-26 DIAGNOSIS — M51.9 LUMBAR DISC DISEASE: ICD-10-CM

## 2024-08-26 ASSESSMENT — ENCOUNTER SYMPTOMS
SHORTNESS OF BREATH: 0
VOMITING: 0
ABDOMINAL PAIN: 0
NAUSEA: 0

## 2024-08-26 NOTE — PROGRESS NOTES
[facility]  [facility] PRIMARY CARE  [address]  [place] [address]  Dept: [phone]    Muna Lomax is a 74 y.o. female established patient, who presents today for her medical conditions/complaints as noted below:    Chief Complaint   Patient presents with    Hypertension     Patient states she check her blood pressure at home sometimes, patient reports good readings. Patient states she has been working on her diet and has been feeling good.       HPI:     HTN: managed with carvedilol 3.125 mg BID, clonidine 0.3 mg nightly, spironolactone 50 mg daily.    HLD: Tolerates simvastatin 20 mg daily without issue.    GERD managed with esomeprazole 20 mg daily.    Has been losing weight, eating healthier.     Reviewed prior notes: orthopedics and previous PCP  Reviewed previous labs and imaging.    No components found for: \"LDLCHOLESTEROL\", \"LDLCALC\"    (goal LDL is <100)   AST (U/L)   Date Value   02/29/2024 17     ALT (U/L)   Date Value   02/29/2024 14     BUN (mg/dL)   Date Value   07/10/2024 16     TSH (uIU/mL)   Date Value   08/04/2023 3.16     BP Readings from Last 3 Encounters:   08/26/24 112/80   07/27/24 127/71   07/10/24 132/75          (goal 120/80)    Past Medical History:   Diagnosis Date    Arthritis     Asthma     \"winter\" asthma    Bronchitis     Constipation     GERD (gastroesophageal reflux disease)     Hyperlipidemia     Hypertension     Hypokalemia     Overactive bladder     Tachycardia     \"racing heart\"    Wears glasses       Past Surgical History:   Procedure Laterality Date    BACK SURGERY  1983 and 1992    neck and lower back    BLADDER SURGERY  2006, 2008    2 surgeries: sling surgery first then another sling and tack bladder up surgery    CATARACT REMOVAL WITH IMPLANT Bilateral     COLONOSCOPY  2015    had polyp, done at [facility]. Dr Naranjo    COLONOSCOPY N/A 8/21/2018    COLONOSCOPY POLYPECTOMY /COLD BX  performed by Aldo Naranjo MD at [facility] OR     unspecified whether esophagitis present        4. Lumbar disc disease             BP in office is 112/80, which is at target carvedilol 3.125 mg BID, clonidine 0.3 mg nightly, and spironolactone 50 mg daily.    Continues to tolerate simvastatin 20 mg for HLD without issue.    GERD seems to be well-controlled with esomeprazole 20 mg daily.    Lumbar disc disease and stenosis are s/p surgical intervention, hardware. Managed by Dr. Vallejo. Has follow-up in the coming weeks. Will discuss PT.  Plan:   Continue current HTN regimen  Continue simvastatin for HLD  Continue to follow with ortho spine for lumbar stenosis/degenerative disc disease  Continue esomeprazole for GERD  Can recheck lab work, including lipid panel at follow-up    Return in about 3 months (around 11/26/2024) for Medicare Wellness Visit.    No orders of the defined types were placed in this encounter.    No orders of the defined types were placed in this encounter.          Discussed risks and benefits of above plan. All patient questions were answered prior to patient leaving the office, today. Reviewed health maintenance. Instructed regarding current medications, diet, and exercise. Patient agreed with treatment plan.    Electronically signed by Vance Madden DO on 8/26/2024 at 11:00 AM

## 2024-08-30 ENCOUNTER — OFFICE VISIT (OUTPATIENT)
Dept: PODIATRY | Age: 74
End: 2024-08-30
Payer: MEDICARE

## 2024-08-30 VITALS — BODY MASS INDEX: 33.12 KG/M2 | HEIGHT: 64 IN | WEIGHT: 194 LBS

## 2024-08-30 DIAGNOSIS — M79.674 PAIN OF TOES OF BOTH FEET: ICD-10-CM

## 2024-08-30 DIAGNOSIS — B35.1 ONYCHOMYCOSIS OF TOENAIL: Primary | ICD-10-CM

## 2024-08-30 DIAGNOSIS — M79.675 PAIN OF TOES OF BOTH FEET: ICD-10-CM

## 2024-08-30 PROCEDURE — 99999 PR OFFICE/OUTPT VISIT,PROCEDURE ONLY: CPT | Performed by: PODIATRIST

## 2024-08-30 PROCEDURE — 11721 DEBRIDE NAIL 6 OR MORE: CPT | Performed by: PODIATRIST

## 2024-09-03 ENCOUNTER — OFFICE VISIT (OUTPATIENT)
Dept: ORTHOPEDIC SURGERY | Age: 74
End: 2024-09-03

## 2024-09-03 VITALS — HEIGHT: 64 IN | WEIGHT: 194 LBS | RESPIRATION RATE: 14 BRPM | BODY MASS INDEX: 33.12 KG/M2

## 2024-09-03 DIAGNOSIS — Z98.1 HISTORY OF LUMBAR FUSION: Primary | ICD-10-CM

## 2024-09-03 PROCEDURE — 99024 POSTOP FOLLOW-UP VISIT: CPT | Performed by: ORTHOPAEDIC SURGERY

## 2024-09-03 ASSESSMENT — ENCOUNTER SYMPTOMS
COLOR CHANGE: 0
SHORTNESS OF BREATH: 0
NAUSEA: 0
BACK PAIN: 0
DIARRHEA: 0

## 2024-09-03 NOTE — PROGRESS NOTES
SUBJECTIVE: Muna Lomax is a 74 y.o. female who returns to the office with chief complaint of painful fungal toenails. Patient relates toe nails are thickened/difficult to trim as well as painful with ambulation and with shoe gear.   Chief Complaint   Patient presents with    Nail Problem     B/l nail trim, last seen Dr. Madden 8/26/24     Review of Systems   Constitutional:  Negative for activity change, appetite change, chills, diaphoresis, fatigue and fever.   Respiratory:  Negative for shortness of breath.    Cardiovascular:  Negative for leg swelling.   Gastrointestinal:  Negative for diarrhea and nausea.   Endocrine: Negative for cold intolerance, heat intolerance and polyuria.   Musculoskeletal:  Positive for arthralgias. Negative for back pain, gait problem, joint swelling and myalgias.   Skin:  Negative for color change, pallor, rash and wound.   Allergic/Immunologic: Negative for environmental allergies and food allergies.   Neurological:  Negative for dizziness, weakness, light-headedness and numbness.   Hematological:  Does not bruise/bleed easily.   Psychiatric/Behavioral:  Negative for behavioral problems, confusion and self-injury. The patient is not nervous/anxious.      OBJECTIVE: Clinical evaluation of patient reveals nails 1,2,3,4,5 of the right foot and nails 1,2,3,4,5 of the left foot to present with thickness, elongation, discoloration, brittleness, and subungual debris. There was pain with palpation and debridement of the toenails of the bilateral feet. No open lesions noted to either foot today.     Class A Findings (1 needed)   [] Non-traumatic amputation of foot or integral skeleton portion thereof.   [] Q7.      Class B Findings (2 needed)   1. [] Absent posterior tibial pulse   2. [] Absent dorsalis pedis pulse   3. [] Advanced trophic changes; three of the following are required:   ·         [] hair growth (decrease or absence)   ·         [] nail changes (thickening)   ·         []

## 2024-09-03 NOTE — PROGRESS NOTES
direction and in my presence. I have reviewed the chart and discharge instructions and agree that the records reflect my personal performance and is accurate and complete. Noam Vallejo MD 9/3/24       Scribe Attestation:  By signing my name below, I, Kendradonna Juan, attest that this documentation has been prepared under the direction and in the presence of Dr. Noam Vallejo. Electronically signed: Sonido Lyle, 9/3/24     Please note that this chart was generated using voice recognition Dragon dictation software.  Although every effort was made to ensure the accuracy of this automated transcription, some errors in transcription may have occurred.

## 2024-10-15 ENCOUNTER — OFFICE VISIT (OUTPATIENT)
Dept: ORTHOPEDIC SURGERY | Age: 74
End: 2024-10-15
Payer: MEDICARE

## 2024-10-15 VITALS — BODY MASS INDEX: 33.12 KG/M2 | WEIGHT: 194 LBS | HEIGHT: 64 IN | RESPIRATION RATE: 14 BRPM

## 2024-10-15 DIAGNOSIS — Z98.1 HISTORY OF LUMBAR FUSION: ICD-10-CM

## 2024-10-15 DIAGNOSIS — M54.50 LOW BACK PAIN, UNSPECIFIED BACK PAIN LATERALITY, UNSPECIFIED CHRONICITY, UNSPECIFIED WHETHER SCIATICA PRESENT: Primary | ICD-10-CM

## 2024-10-15 PROCEDURE — G8417 CALC BMI ABV UP PARAM F/U: HCPCS | Performed by: ORTHOPAEDIC SURGERY

## 2024-10-15 PROCEDURE — G8399 PT W/DXA RESULTS DOCUMENT: HCPCS | Performed by: ORTHOPAEDIC SURGERY

## 2024-10-15 PROCEDURE — 3017F COLORECTAL CA SCREEN DOC REV: CPT | Performed by: ORTHOPAEDIC SURGERY

## 2024-10-15 PROCEDURE — G8484 FLU IMMUNIZE NO ADMIN: HCPCS | Performed by: ORTHOPAEDIC SURGERY

## 2024-10-15 PROCEDURE — 1123F ACP DISCUSS/DSCN MKR DOCD: CPT | Performed by: ORTHOPAEDIC SURGERY

## 2024-10-15 PROCEDURE — 99213 OFFICE O/P EST LOW 20 MIN: CPT | Performed by: ORTHOPAEDIC SURGERY

## 2024-10-15 PROCEDURE — 1090F PRES/ABSN URINE INCON ASSESS: CPT | Performed by: ORTHOPAEDIC SURGERY

## 2024-10-15 PROCEDURE — G8428 CUR MEDS NOT DOCUMENT: HCPCS | Performed by: ORTHOPAEDIC SURGERY

## 2024-10-15 PROCEDURE — 1036F TOBACCO NON-USER: CPT | Performed by: ORTHOPAEDIC SURGERY

## 2024-10-15 NOTE — PROGRESS NOTES
at [facility]. Dr Naranjo    COLONOSCOPY N/A 8/21/2018    COLONOSCOPY POLYPECTOMY /COLD BX  performed by Aldo Naranjo MD at [email or web address] OR    COLONOSCOPY N/A 12/13/2021    COLONOSCOPY POLYPECTOMY SNARE/COLD BIOPSY performed by Aldo Naranjo MD at [facility] [[email or web address]] ENDO    CYST REMOVAL  02/2017    upper rt back.    KNEE ARTHROSCOPY Right     LUMBAR FUSION N/A 7/24/2024    POSTERIOR DECOMPRESSION  INSTRUMENTED FUSION L4-S1 performed by Noam Vallejo MD at [facility] [[email or web address]] OR    SC ARTHROSCOPY KNEE DIAGNOSTIC W/WO SYNOVIAL BX SPX Right 10/4/2018    KNEE ARTHROSCOPY FOR PARTIAL, MEDIAL MENISCECTOMY. performed by Jordan Stanton MD at [facility] [[email or web address]] OR    STIMULATOR SURGERY N/A 10/11/2021    AXONICS STIMULATOR INSERTION STAGE 1 (N/A )    STIMULATOR SURGERY N/A 10/11/2021    AXONICS STIMULATOR INSERTION STAGE 1 performed by Chip Macias DO at [facility] OR    STIMULATOR SURGERY  10/25/2021     AXONICS STIMULATOR INSERTION STAGE 2     STIMULATOR SURGERY N/A 10/25/2021    AXONICS STIMULATOR INSERTION STAGE 2 performed by Chip Macias DO at [facility] OR    TOTAL HIP ARTHROPLASTY Bilateral 2013, 2015    Dr Stanton    TUBAL LIGATION       Family History   Problem Relation Age of Onset    Hypertension Mother     Fainting Mother     Hypertension Sister     Heart Disease Maternal Grandmother         Physical Exam:  Vitals signs and nursing note reviewed.   Constitutional:       Appearance: well-developed.   HENT:      Head: Normocephalic and atraumatic.      Nose: Nose normal.   Eyes:      Conjunctiva/sclera: Conjunctivae normal.   Neck:      Musculoskeletal: Normal range of motion and neck supple.   Pulmonary:      Effort: Pulmonary effort is normal. No respiratory distress.   Musculoskeletal:      Comments: Normal gait     Skin:     General: Skin is warm and dry.   Neurological:      Mental Status: Alert and oriented to person, place, and time.      Sensory: No sensory deficit.   Psychiatric:         Behavior: Behavior normal.         Thought

## 2024-11-18 ENCOUNTER — TELEPHONE (OUTPATIENT)
Age: 74
End: 2024-11-18

## 2024-11-18 NOTE — TELEPHONE ENCOUNTER
Patient called back to reschedule her appt with Dr. Nation from 12/3 due to  being on leave and rescheduled for 12/31 @ 8:30a with Karyn KNOX.

## 2024-11-18 NOTE — TELEPHONE ENCOUNTER
Called patient and left voice mail in regard to needing to reschedule 12/3 appt due to Dr. Jaramillo being out of the office. We can schedule with Karyn on next available appt.     Waiting for patient callback

## 2024-11-30 DIAGNOSIS — I10 ESSENTIAL HYPERTENSION: ICD-10-CM

## 2024-12-02 RX ORDER — SPIRONOLACTONE 50 MG/1
TABLET, FILM COATED ORAL
Qty: 90 TABLET | Refills: 1 | Status: SHIPPED | OUTPATIENT
Start: 2024-12-02

## 2024-12-03 DIAGNOSIS — I10 ESSENTIAL HYPERTENSION: ICD-10-CM

## 2024-12-03 RX ORDER — CLONIDINE HYDROCHLORIDE 0.3 MG/1
TABLET ORAL
Qty: 90 TABLET | Refills: 0 | Status: SHIPPED | OUTPATIENT
Start: 2024-12-03

## 2024-12-03 RX ORDER — SIMVASTATIN 20 MG
TABLET ORAL
Qty: 90 TABLET | Refills: 0 | Status: SHIPPED | OUTPATIENT
Start: 2024-12-03

## 2024-12-06 ENCOUNTER — OFFICE VISIT (OUTPATIENT)
Dept: PODIATRY | Age: 74
End: 2024-12-06
Payer: MEDICARE

## 2024-12-06 VITALS — BODY MASS INDEX: 33.29 KG/M2 | WEIGHT: 195 LBS | HEIGHT: 64 IN

## 2024-12-06 DIAGNOSIS — M79.674 PAIN OF TOES OF BOTH FEET: ICD-10-CM

## 2024-12-06 DIAGNOSIS — M79.675 PAIN OF TOES OF BOTH FEET: ICD-10-CM

## 2024-12-06 DIAGNOSIS — B35.1 ONYCHOMYCOSIS OF TOENAIL: Primary | ICD-10-CM

## 2024-12-06 PROCEDURE — 99999 PR OFFICE/OUTPT VISIT,PROCEDURE ONLY: CPT | Performed by: PODIATRIST

## 2024-12-06 PROCEDURE — 11721 DEBRIDE NAIL 6 OR MORE: CPT | Performed by: PODIATRIST

## 2024-12-09 SDOH — HEALTH STABILITY: PHYSICAL HEALTH: ON AVERAGE, HOW MANY DAYS PER WEEK DO YOU ENGAGE IN MODERATE TO STRENUOUS EXERCISE (LIKE A BRISK WALK)?: 3 DAYS

## 2024-12-09 SDOH — HEALTH STABILITY: PHYSICAL HEALTH: ON AVERAGE, HOW MANY MINUTES DO YOU ENGAGE IN EXERCISE AT THIS LEVEL?: 30 MIN

## 2024-12-09 ASSESSMENT — ENCOUNTER SYMPTOMS
DIARRHEA: 0
SHORTNESS OF BREATH: 0
COLOR CHANGE: 0
NAUSEA: 0
BACK PAIN: 0

## 2024-12-09 ASSESSMENT — PATIENT HEALTH QUESTIONNAIRE - PHQ9
2. FEELING DOWN, DEPRESSED OR HOPELESS: NOT AT ALL
SUM OF ALL RESPONSES TO PHQ QUESTIONS 1-9: 0
SUM OF ALL RESPONSES TO PHQ QUESTIONS 1-9: 0
1. LITTLE INTEREST OR PLEASURE IN DOING THINGS: NOT AT ALL
SUM OF ALL RESPONSES TO PHQ QUESTIONS 1-9: 0
SUM OF ALL RESPONSES TO PHQ QUESTIONS 1-9: 0
SUM OF ALL RESPONSES TO PHQ9 QUESTIONS 1 & 2: 0

## 2024-12-09 ASSESSMENT — LIFESTYLE VARIABLES
HOW MANY STANDARD DRINKS CONTAINING ALCOHOL DO YOU HAVE ON A TYPICAL DAY: 1 OR 2
HOW MANY STANDARD DRINKS CONTAINING ALCOHOL DO YOU HAVE ON A TYPICAL DAY: 1
HOW OFTEN DO YOU HAVE A DRINK CONTAINING ALCOHOL: 2
HOW OFTEN DO YOU HAVE A DRINK CONTAINING ALCOHOL: MONTHLY OR LESS
HOW OFTEN DO YOU HAVE SIX OR MORE DRINKS ON ONE OCCASION: 1

## 2024-12-19 ENCOUNTER — OFFICE VISIT (OUTPATIENT)
Dept: PRIMARY CARE CLINIC | Age: 74
End: 2024-12-19

## 2024-12-19 VITALS
SYSTOLIC BLOOD PRESSURE: 130 MMHG | DIASTOLIC BLOOD PRESSURE: 86 MMHG | WEIGHT: 200.2 LBS | HEART RATE: 72 BPM | HEIGHT: 64 IN | BODY MASS INDEX: 34.18 KG/M2 | OXYGEN SATURATION: 95 %

## 2024-12-19 DIAGNOSIS — R30.0 DYSURIA: ICD-10-CM

## 2024-12-19 DIAGNOSIS — Z00.00 MEDICARE ANNUAL WELLNESS VISIT, SUBSEQUENT: Primary | ICD-10-CM

## 2024-12-19 LAB
BILIRUBIN, POC: NEGATIVE
BLOOD URINE, POC: NEGATIVE
CLARITY, POC: NORMAL
COLOR, POC: NORMAL
GLUCOSE URINE, POC: NEGATIVE MG/DL
KETONES, POC: NEGATIVE MG/DL
LEUKOCYTE EST, POC: NEGATIVE
NITRITE, POC: NEGATIVE
PH, POC: 7
PROTEIN, POC: NEGATIVE MG/DL
SPECIFIC GRAVITY, POC: 1.02
UROBILINOGEN, POC: 0.2 MG/DL

## 2024-12-19 NOTE — PROGRESS NOTES
apply route For orthopedic issues, expires 9/16/2027 Yes Abbey Plasencia MD   Coenzyme Q10 (CO Q 10) 100 MG CAPS Take by mouth Yes Niels Can MD   Cholecalciferol (VITAMIN D3) 50 MCG (2000 UT) CAPS Take by mouth 2 tablets daily Yes Niels Can MD   Probiotic Product (PROBIOTIC-10 PO) Take by mouth Yes Niels Can MD   Omega-3 Fatty Acids (FISH OIL) 1000 MG CAPS Take 3 capsules by mouth daily Yes Niels Can MD   aspirin 81 MG EC tablet Take 1 tablet by mouth daily Yes Niels Can MD   Calcium Citrate-Vitamin D (CALCIUM CITRATE + D PO) Take 2 tablets by mouth daily  Yes Niels Can MD   Calcium Polycarbophil (FIBER-CAPS PO) Take 5 capsules by mouth daily Yes Niels Can MD   esomeprazole (NEXIUM) 20 MG delayed release capsule Take 2 capsules by mouth daily Yes Abbey Plasencia MD   Multiple Vitamin TABS Take 1 tablet by mouth daily Yes Niels Can MD   Ascorbic Acid (VITAMIN C) 500 MG tablet Take 1 tablet by mouth daily Indications: take as directed Yes Niels Can MD   Vitamin E 400 UNITS TABS Take 400 Units by mouth Twice a Week Indications: take as directed  Yes Niels Can MD   clindamycin (CLEOCIN) 300 MG capsule Patient will take 6 prior to dentist appointment next week.  Patient not taking: Reported on 12/19/2024  Provider, Historical, MD       CareTeam (Including outside providers/suppliers regularly involved in providing care):   Patient Care Team:  Abbey Plasencia MD as PCP - General (Family Medicine)  Abbey Plasencia MD as PCP - Empaneled Provider      Reviewed and updated this visit:  Tobacco  Allergies  Meds  Problems  Med Hx  Surg Hx  Soc Hx  Fam Hx

## 2024-12-20 DIAGNOSIS — R30.0 DYSURIA: ICD-10-CM

## 2024-12-21 LAB
CULTURE: NORMAL
SPECIMEN DESCRIPTION: NORMAL

## 2024-12-31 ENCOUNTER — OFFICE VISIT (OUTPATIENT)
Age: 74
End: 2024-12-31
Payer: MEDICARE

## 2024-12-31 VITALS
DIASTOLIC BLOOD PRESSURE: 89 MMHG | HEART RATE: 77 BPM | WEIGHT: 200 LBS | SYSTOLIC BLOOD PRESSURE: 152 MMHG | BODY MASS INDEX: 34.31 KG/M2

## 2024-12-31 DIAGNOSIS — E78.2 MIXED HYPERLIPIDEMIA: ICD-10-CM

## 2024-12-31 DIAGNOSIS — N18.31 STAGE 3A CHRONIC KIDNEY DISEASE (HCC): ICD-10-CM

## 2024-12-31 DIAGNOSIS — Z01.810 PREOPERATIVE CARDIOVASCULAR EXAMINATION: ICD-10-CM

## 2024-12-31 DIAGNOSIS — I10 ESSENTIAL HYPERTENSION: ICD-10-CM

## 2024-12-31 DIAGNOSIS — R00.0 TACHYCARDIA: Primary | ICD-10-CM

## 2024-12-31 DIAGNOSIS — E66.9 OBESITY (BMI 30-39.9): ICD-10-CM

## 2024-12-31 DIAGNOSIS — R06.02 SHORTNESS OF BREATH: ICD-10-CM

## 2024-12-31 PROCEDURE — 1090F PRES/ABSN URINE INCON ASSESS: CPT

## 2024-12-31 PROCEDURE — G8484 FLU IMMUNIZE NO ADMIN: HCPCS

## 2024-12-31 PROCEDURE — 1036F TOBACCO NON-USER: CPT

## 2024-12-31 PROCEDURE — G8399 PT W/DXA RESULTS DOCUMENT: HCPCS

## 2024-12-31 PROCEDURE — 99204 OFFICE O/P NEW MOD 45 MIN: CPT

## 2024-12-31 PROCEDURE — 3017F COLORECTAL CA SCREEN DOC REV: CPT

## 2024-12-31 PROCEDURE — G8427 DOCREV CUR MEDS BY ELIG CLIN: HCPCS

## 2024-12-31 PROCEDURE — 1123F ACP DISCUSS/DSCN MKR DOCD: CPT

## 2024-12-31 PROCEDURE — G8417 CALC BMI ABV UP PARAM F/U: HCPCS

## 2024-12-31 PROCEDURE — 3077F SYST BP >= 140 MM HG: CPT

## 2024-12-31 PROCEDURE — 3079F DIAST BP 80-89 MM HG: CPT

## 2024-12-31 PROCEDURE — 93000 ELECTROCARDIOGRAM COMPLETE: CPT

## 2024-12-31 PROCEDURE — 1159F MED LIST DOCD IN RCRD: CPT

## 2024-12-31 ASSESSMENT — ENCOUNTER SYMPTOMS
BACK PAIN: 1
SHORTNESS OF BREATH: 1

## 2025-01-09 ENCOUNTER — HOSPITAL ENCOUNTER (OUTPATIENT)
Age: 75
Discharge: HOME | End: 2025-01-11
Payer: MEDICARE

## 2025-01-09 DIAGNOSIS — R06.02 SHORTNESS OF BREATH: ICD-10-CM

## 2025-01-09 LAB
ECHO AO ROOT DIAM: 2.7 CM
ECHO AV PEAK GRADIENT: 6 MMHG
ECHO AV PEAK VELOCITY: 1.3 M/S
ECHO LA AREA 4C: 17.7 CM2
ECHO LA DIAMETER: 3.8 CM
ECHO LA MAJOR AXIS: 5.1 CM
ECHO LA TO AORTIC ROOT RATIO: 1.41
ECHO LA VOL MOD A4C: 48 ML (ref 22–52)
ECHO LV E' LATERAL VELOCITY: 5.87 CM/S
ECHO LV E' SEPTAL VELOCITY: 6.53 CM/S
ECHO LV EDV A4C: 66 ML
ECHO LV EJECTION FRACTION A4C: 57 %
ECHO LV EJECTION FRACTION BIPLANE: 57 % (ref 55–100)
ECHO LV ESV A4C: 29 ML
ECHO LV FRACTIONAL SHORTENING: 28 % (ref 28–44)
ECHO LV INTERNAL DIMENSION DIASTOLIC: 4.3 CM (ref 3.9–5.3)
ECHO LV INTERNAL DIMENSION SYSTOLIC: 3.1 CM
ECHO LV IVSD: 1.1 CM (ref 0.6–0.9)
ECHO LV MASS 2D: 162.9 G (ref 67–162)
ECHO LV POSTERIOR WALL DIASTOLIC: 1.1 CM (ref 0.6–0.9)
ECHO LV RELATIVE WALL THICKNESS RATIO: 0.51
ECHO MV A VELOCITY: 1.09 M/S
ECHO MV E DECELERATION TIME (DT): 289 MS
ECHO MV E VELOCITY: 0.79 M/S
ECHO MV E/A RATIO: 0.72
ECHO MV E/E' LATERAL: 13.46
ECHO MV E/E' RATIO (AVERAGED): 12.78
ECHO MV E/E' SEPTAL: 12.1

## 2025-01-09 PROCEDURE — 93306 TTE W/DOPPLER COMPLETE: CPT

## 2025-01-10 ENCOUNTER — TELEPHONE (OUTPATIENT)
Age: 75
End: 2025-01-10

## 2025-01-10 NOTE — TELEPHONE ENCOUNTER
Called patient and gave normal ECHO results    Scheduled follow up appointment for 6/10/25    Patient voiced understanding.

## 2025-01-14 ENCOUNTER — OFFICE VISIT (OUTPATIENT)
Dept: ORTHOPEDIC SURGERY | Age: 75
End: 2025-01-14
Payer: MEDICARE

## 2025-01-14 VITALS — HEIGHT: 64 IN | WEIGHT: 195 LBS | BODY MASS INDEX: 33.29 KG/M2 | RESPIRATION RATE: 14 BRPM

## 2025-01-14 DIAGNOSIS — Z98.1 HISTORY OF LUMBAR FUSION: Primary | ICD-10-CM

## 2025-01-14 DIAGNOSIS — M48.061 SPINAL STENOSIS OF LUMBAR REGION, UNSPECIFIED WHETHER NEUROGENIC CLAUDICATION PRESENT: ICD-10-CM

## 2025-01-14 PROCEDURE — 3017F COLORECTAL CA SCREEN DOC REV: CPT | Performed by: ORTHOPAEDIC SURGERY

## 2025-01-14 PROCEDURE — 1123F ACP DISCUSS/DSCN MKR DOCD: CPT | Performed by: ORTHOPAEDIC SURGERY

## 2025-01-14 PROCEDURE — 99213 OFFICE O/P EST LOW 20 MIN: CPT | Performed by: ORTHOPAEDIC SURGERY

## 2025-01-14 PROCEDURE — G8428 CUR MEDS NOT DOCUMENT: HCPCS | Performed by: ORTHOPAEDIC SURGERY

## 2025-01-14 PROCEDURE — 1126F AMNT PAIN NOTED NONE PRSNT: CPT | Performed by: ORTHOPAEDIC SURGERY

## 2025-01-14 PROCEDURE — G8417 CALC BMI ABV UP PARAM F/U: HCPCS | Performed by: ORTHOPAEDIC SURGERY

## 2025-01-14 PROCEDURE — 1090F PRES/ABSN URINE INCON ASSESS: CPT | Performed by: ORTHOPAEDIC SURGERY

## 2025-01-14 PROCEDURE — 1036F TOBACCO NON-USER: CPT | Performed by: ORTHOPAEDIC SURGERY

## 2025-01-14 PROCEDURE — G8399 PT W/DXA RESULTS DOCUMENT: HCPCS | Performed by: ORTHOPAEDIC SURGERY

## 2025-01-14 NOTE — PROGRESS NOTES
Behavior: Behavior normal.         Thought Content: Thought content normal.        Please note that this chart was generated using voice recognition Dragon dictation software.  Although every effort was made to ensure the accuracy of this automated transcription, some errors in transcription may have occurred.

## 2025-02-04 ENCOUNTER — OFFICE VISIT (OUTPATIENT)
Dept: PODIATRY | Age: 75
End: 2025-02-04
Payer: MEDICARE

## 2025-02-04 VITALS — WEIGHT: 195 LBS | BODY MASS INDEX: 33.29 KG/M2 | HEIGHT: 64 IN

## 2025-02-04 DIAGNOSIS — M24.571 EQUINUS CONTRACTURE OF RIGHT ANKLE: ICD-10-CM

## 2025-02-04 DIAGNOSIS — R60.0 EDEMA, LOWER EXTREMITY: ICD-10-CM

## 2025-02-04 DIAGNOSIS — M92.61 APOPHYSITIS OF RIGHT CALCANEUS: Primary | ICD-10-CM

## 2025-02-04 DIAGNOSIS — M79.671 PAIN IN RIGHT FOOT: ICD-10-CM

## 2025-02-04 PROCEDURE — G8399 PT W/DXA RESULTS DOCUMENT: HCPCS | Performed by: PODIATRIST

## 2025-02-04 PROCEDURE — 3017F COLORECTAL CA SCREEN DOC REV: CPT | Performed by: PODIATRIST

## 2025-02-04 PROCEDURE — 1090F PRES/ABSN URINE INCON ASSESS: CPT | Performed by: PODIATRIST

## 2025-02-04 PROCEDURE — G8427 DOCREV CUR MEDS BY ELIG CLIN: HCPCS | Performed by: PODIATRIST

## 2025-02-04 PROCEDURE — 1159F MED LIST DOCD IN RCRD: CPT | Performed by: PODIATRIST

## 2025-02-04 PROCEDURE — 1036F TOBACCO NON-USER: CPT | Performed by: PODIATRIST

## 2025-02-04 PROCEDURE — 99213 OFFICE O/P EST LOW 20 MIN: CPT | Performed by: PODIATRIST

## 2025-02-04 PROCEDURE — 1125F AMNT PAIN NOTED PAIN PRSNT: CPT | Performed by: PODIATRIST

## 2025-02-04 PROCEDURE — G8417 CALC BMI ABV UP PARAM F/U: HCPCS | Performed by: PODIATRIST

## 2025-02-04 PROCEDURE — 1123F ACP DISCUSS/DSCN MKR DOCD: CPT | Performed by: PODIATRIST

## 2025-02-04 ASSESSMENT — ENCOUNTER SYMPTOMS
COLOR CHANGE: 0
BACK PAIN: 0
NAUSEA: 0
DIARRHEA: 0
SHORTNESS OF BREATH: 0

## 2025-02-04 NOTE — PROGRESS NOTES
[facility]  Return Patient Progress Note    Subjective: Muna Lomax 74 y.o. female that presents with chief complaint of pain to the back of the right heel.  Chief Complaint   Patient presents with    Foot Pain     Right heel, started last Wednesday     Patient states that this pain has been present since last Wednesday.  Patient states that she bought a new pair of shoes and the right heel has been painful ever since.  However, patient states that over the last couple of days her pain has decreased.  Pain is rated 5 out of 10 and is described as intermittent. Patient denies any treatment for this prior to today.    Review of Systems   Constitutional:  Negative for activity change, appetite change, chills, diaphoresis, fatigue and fever.   Respiratory:  Negative for shortness of breath.    Cardiovascular:  Negative for leg swelling.   Gastrointestinal:  Negative for diarrhea and nausea.   Endocrine: Negative for cold intolerance, heat intolerance and polyuria.   Musculoskeletal:  Positive for arthralgias. Negative for back pain, gait problem, joint swelling and myalgias.   Skin:  Negative for color change, pallor, rash and wound.   Allergic/Immunologic: Negative for environmental allergies and food allergies.   Neurological:  Negative for dizziness, weakness, light-headedness and numbness.   Hematological:  Does not bruise/bleed easily.   Psychiatric/Behavioral:  Negative for behavioral problems, confusion and self-injury. The patient is not nervous/anxious.        Objective: Clinical evaluation of the patient reveals pain with palpation to the posterior medial aspect of the right heel at the insertion of the Achilles tendon.  There is no pain with palpation to the posterior lateral aspect of the right heel.  There is no pain with palpation to the watershed region of the right Achilles tendon.  There is contracture of the right foot and ankle with the leg straightened, but this contracture resolves

## 2025-03-01 DIAGNOSIS — I10 ESSENTIAL HYPERTENSION: ICD-10-CM

## 2025-03-03 RX ORDER — SIMVASTATIN 20 MG
TABLET ORAL
Qty: 90 TABLET | Refills: 0 | Status: SHIPPED | OUTPATIENT
Start: 2025-03-03

## 2025-03-03 RX ORDER — CLONIDINE HYDROCHLORIDE 0.3 MG/1
TABLET ORAL
Qty: 90 TABLET | Refills: 0 | Status: SHIPPED | OUTPATIENT
Start: 2025-03-03

## 2025-03-07 ENCOUNTER — OFFICE VISIT (OUTPATIENT)
Dept: PODIATRY | Age: 75
End: 2025-03-07

## 2025-03-07 VITALS — BODY MASS INDEX: 33.29 KG/M2 | HEIGHT: 64 IN | WEIGHT: 195 LBS

## 2025-03-07 DIAGNOSIS — B35.1 ONYCHOMYCOSIS OF TOENAIL: Primary | ICD-10-CM

## 2025-03-07 DIAGNOSIS — M79.675 PAIN OF TOES OF BOTH FEET: ICD-10-CM

## 2025-03-07 DIAGNOSIS — M79.674 PAIN OF TOES OF BOTH FEET: ICD-10-CM

## 2025-03-08 ASSESSMENT — ENCOUNTER SYMPTOMS
NAUSEA: 0
COLOR CHANGE: 0
SHORTNESS OF BREATH: 0
DIARRHEA: 0
BACK PAIN: 0

## 2025-03-08 NOTE — PROGRESS NOTES
[] pigmentary changes (discoloration)   ·         [] skin texture (thin, shiny)   ·         [] skin color (rubor or redness)   [] Q8.      Class C Findings (1 Class B, 2 Class C needed)   1. [] Claudication   2. [] Temperature changes   3. [] Edema   4. [] Paresthesia   5. [] Burning   [] Q9.     NO CLASS FINDINGS ARE NOTED    ASSESSMENT:    Diagnosis Orders   1. Onychomycosis of toenail  DEBRIDEMENT OF NAILS, 6 OR MORE      2. Pain of toes of both feet  DEBRIDEMENT OF NAILS, 6 OR MORE        PLAN: Toenails 1,2,3,4,5 of the right foot and 1,2,3,4,5 of the left foot were debrided in length and thickness using a nail nipper and a . Return in about 9 weeks (around 5/9/2025) for Painful fungal nails.   3/7/2025      Angel Luis Caal DPM

## 2025-04-29 ENCOUNTER — OFFICE VISIT (OUTPATIENT)
Dept: ORTHOPEDIC SURGERY | Age: 75
End: 2025-04-29
Payer: MEDICARE

## 2025-04-29 VITALS — WEIGHT: 200 LBS | RESPIRATION RATE: 14 BRPM | BODY MASS INDEX: 34.15 KG/M2 | HEIGHT: 64 IN

## 2025-04-29 DIAGNOSIS — Z98.1 HISTORY OF LUMBAR FUSION: Primary | ICD-10-CM

## 2025-04-29 DIAGNOSIS — R29.898 WEAKNESS OF LEFT LOWER EXTREMITY: ICD-10-CM

## 2025-04-29 DIAGNOSIS — M54.50 LOW BACK PAIN, UNSPECIFIED BACK PAIN LATERALITY, UNSPECIFIED CHRONICITY, UNSPECIFIED WHETHER SCIATICA PRESENT: ICD-10-CM

## 2025-04-29 DIAGNOSIS — M54.16 LUMBAR RADICULAR PAIN: ICD-10-CM

## 2025-04-29 DIAGNOSIS — M48.061 SPINAL STENOSIS OF LUMBAR REGION, UNSPECIFIED WHETHER NEUROGENIC CLAUDICATION PRESENT: ICD-10-CM

## 2025-04-29 PROCEDURE — 1036F TOBACCO NON-USER: CPT | Performed by: ORTHOPAEDIC SURGERY

## 2025-04-29 PROCEDURE — 3017F COLORECTAL CA SCREEN DOC REV: CPT | Performed by: ORTHOPAEDIC SURGERY

## 2025-04-29 PROCEDURE — G8399 PT W/DXA RESULTS DOCUMENT: HCPCS | Performed by: ORTHOPAEDIC SURGERY

## 2025-04-29 PROCEDURE — 1090F PRES/ABSN URINE INCON ASSESS: CPT | Performed by: ORTHOPAEDIC SURGERY

## 2025-04-29 PROCEDURE — 1159F MED LIST DOCD IN RCRD: CPT | Performed by: ORTHOPAEDIC SURGERY

## 2025-04-29 PROCEDURE — 1123F ACP DISCUSS/DSCN MKR DOCD: CPT | Performed by: ORTHOPAEDIC SURGERY

## 2025-04-29 PROCEDURE — 99213 OFFICE O/P EST LOW 20 MIN: CPT | Performed by: ORTHOPAEDIC SURGERY

## 2025-04-29 PROCEDURE — 1125F AMNT PAIN NOTED PAIN PRSNT: CPT | Performed by: ORTHOPAEDIC SURGERY

## 2025-04-29 PROCEDURE — G8417 CALC BMI ABV UP PARAM F/U: HCPCS | Performed by: ORTHOPAEDIC SURGERY

## 2025-04-29 PROCEDURE — G8427 DOCREV CUR MEDS BY ELIG CLIN: HCPCS | Performed by: ORTHOPAEDIC SURGERY

## 2025-04-29 RX ORDER — PREDNISONE 10 MG/1
TABLET ORAL
Qty: 15 TABLET | Refills: 0 | Status: SHIPPED | OUTPATIENT
Start: 2025-04-29 | End: 2025-05-09

## 2025-04-29 RX ORDER — GABAPENTIN 300 MG/1
300 CAPSULE ORAL 3 TIMES DAILY
Qty: 90 CAPSULE | Refills: 3 | Status: SHIPPED | OUTPATIENT
Start: 2025-04-29 | End: 2025-05-29

## 2025-04-29 RX ORDER — DIAZEPAM 10 MG/1
TABLET ORAL
Qty: 1 TABLET | Refills: 0 | Status: SHIPPED | OUTPATIENT
Start: 2025-04-29 | End: 2025-05-09

## 2025-04-29 RX ORDER — DIAZEPAM 10 MG/1
TABLET ORAL
Qty: 1 TABLET | Refills: 0 | Status: SHIPPED | OUTPATIENT
Start: 2025-04-29 | End: 2025-05-28

## 2025-04-29 NOTE — PROGRESS NOTES
Patient ID: Muna Lomax is a 74 y.o. female    Chief Compliant:  No chief complaint on file.       Diagnostic imaging:  AP lateral lumbar spine status post L4 to sacrum PLIF and bilateral total hip arthroplasty and bladder stimulator stable      Assessment and Plan:  1. History of lumbar fusion    2. Weakness of left lower extremity    3. Lumbar radicular pain    4. Spinal stenosis of lumbar region, unspecified whether neurogenic claudication present    5. Low back pain, unspecified back pain laterality, unspecified chronicity, unspecified whether sciatica present        Acute onset left radicular leg pain with 2-3/5 dorsiflexion EHL left foot    History of L3-5 PLIF for right radicular leg pain predominantly postoperatively she did have some persistent right foot numbness but the right radicular pain largely resolved as far as being pain and she had a good outcome she is due to be seen for annual follow-up in about 6 weeks      Prednisone    Neurontin    MRI    Follow up post mri    HPI:  This is a 74 y.o. female who presents to the clinic today for follow up evaluation of foot numbness.     Bilateral ankle and big toe and second toe numbness and tingling.    Patient aggravated left hip pain when bending over while gardening on Sunday . She notes left leg radicular pain additionally.    S/p  L4-S1 PLIF, 7/24/2024.    She is taking Oxycodone and Tylenol and Ibuprofen    Patient ambulates with a cane    Review of Systems   All other systems reviewed and are negative.      Past History:    Current Outpatient Medications:     cloNIDine (CATAPRES) 0.3 MG tablet, TAKE 1 TABLET BY MOUTH AT NIGHT, Disp: 90 tablet, Rfl: 0    simvastatin (ZOCOR) 20 MG tablet, TAKE 1 TABLET BY MOUTH EVERY DAY, Disp: 90 tablet, Rfl: 0    spironolactone (ALDACTONE) 50 MG tablet, TAKE 1 TABLET BY MOUTH EVERY DAY WITH LUNCH, Disp: 90 tablet, Rfl: 1    senna (SENOKOT) 8.6 MG tablet, Take 1 tablet by mouth daily as needed for Constipation, Disp: ,

## 2025-04-30 ENCOUNTER — HOSPITAL ENCOUNTER (OUTPATIENT)
Dept: MRI IMAGING | Age: 75
Discharge: HOME | End: 2025-05-02
Attending: ORTHOPAEDIC SURGERY
Payer: MEDICARE

## 2025-04-30 DIAGNOSIS — Z98.1 HISTORY OF LUMBAR FUSION: ICD-10-CM

## 2025-04-30 DIAGNOSIS — R29.898 WEAKNESS OF LEFT LOWER EXTREMITY: ICD-10-CM

## 2025-04-30 DIAGNOSIS — M54.50 LOW BACK PAIN, UNSPECIFIED BACK PAIN LATERALITY, UNSPECIFIED CHRONICITY, UNSPECIFIED WHETHER SCIATICA PRESENT: ICD-10-CM

## 2025-04-30 DIAGNOSIS — M48.061 SPINAL STENOSIS OF LUMBAR REGION, UNSPECIFIED WHETHER NEUROGENIC CLAUDICATION PRESENT: ICD-10-CM

## 2025-04-30 DIAGNOSIS — M54.16 LUMBAR RADICULAR PAIN: ICD-10-CM

## 2025-04-30 PROCEDURE — 72148 MRI LUMBAR SPINE W/O DYE: CPT

## 2025-05-08 ENCOUNTER — OFFICE VISIT (OUTPATIENT)
Dept: ORTHOPEDIC SURGERY | Age: 75
End: 2025-05-08

## 2025-05-08 VITALS — RESPIRATION RATE: 14 BRPM | WEIGHT: 204 LBS | BODY MASS INDEX: 34.83 KG/M2 | HEIGHT: 64 IN

## 2025-05-08 DIAGNOSIS — M54.16 LUMBAR RADICULAR PAIN: ICD-10-CM

## 2025-05-08 DIAGNOSIS — Z98.1 HISTORY OF LUMBAR FUSION: Primary | ICD-10-CM

## 2025-05-08 DIAGNOSIS — R29.898 WEAKNESS OF LEFT LOWER EXTREMITY: ICD-10-CM

## 2025-05-08 NOTE — PROGRESS NOTES
Patient ID: Muna Lomax is a 74 y.o. female    Chief Compliant:  Chief Complaint   Patient presents with    Follow-up     Low back pain from PLIF     HPI:  This is a 74 y.o. female who presents to the clinic today for MRI of lumbar spine follow-up.  Patient is following up after an MRI of the lumbar spine was ordered by Dr. Vallejo.      Patient with history of L3-5 PLIF for right radicular leg pain that postoperatively did improve however in the last 2 weeks had significant acute onset of left radicular leg pain and weakness of her left foot with decreased range of motion of dorsiflexion.  Patient has taken prednisone Neurontin as well as Tylenol and ibuprofen without much relief.      Review of Systems   All other systems reviewed and are negative.    Past History:    Current Outpatient Medications:     gabapentin (NEURONTIN) 300 MG capsule, Take 1 capsule by mouth 3 times daily for 90 doses., Disp: 90 capsule, Rfl: 3    predniSONE (DELTASONE) 10 MG tablet, Take 2 tabs by mouth once daily  for the first 5 days, then take 1 tab by mouth once daily for 5 days., Disp: 15 tablet, Rfl: 0    diazePAM (VALIUM) 10 MG tablet, 1 po 1 hour prior to mri, Disp: 1 tablet, Rfl: 0    diazePAM (VALIUM) 10 MG tablet, 1 po 1hour prior to MRI, Disp: 1 tablet, Rfl: 0    cloNIDine (CATAPRES) 0.3 MG tablet, TAKE 1 TABLET BY MOUTH AT NIGHT, Disp: 90 tablet, Rfl: 0    simvastatin (ZOCOR) 20 MG tablet, TAKE 1 TABLET BY MOUTH EVERY DAY, Disp: 90 tablet, Rfl: 0    spironolactone (ALDACTONE) 50 MG tablet, TAKE 1 TABLET BY MOUTH EVERY DAY WITH LUNCH, Disp: 90 tablet, Rfl: 1    senna (SENOKOT) 8.6 MG tablet, Take 1 tablet by mouth daily as needed for Constipation, Disp: , Rfl:     carvedilol (COREG) 3.125 MG tablet, Take 1 tablet by mouth 2 times daily, Disp: 180 tablet, Rfl: 4    triamcinolone (KENALOG) 0.1 % ointment, Apply topically 2 times daily Apply as needed for vulvar itching, Disp: 30 g, Rfl: 3    clindamycin (CLEOCIN) 300 MG capsule,

## 2025-05-15 ENCOUNTER — OFFICE VISIT (OUTPATIENT)
Dept: ORTHOPEDIC SURGERY | Age: 75
End: 2025-05-15
Payer: MEDICARE

## 2025-05-15 VITALS — BODY MASS INDEX: 34.83 KG/M2 | RESPIRATION RATE: 14 BRPM | WEIGHT: 204 LBS | HEIGHT: 64 IN

## 2025-05-15 DIAGNOSIS — R29.898 WEAKNESS OF LEFT LOWER EXTREMITY: ICD-10-CM

## 2025-05-15 DIAGNOSIS — M21.372 LEFT FOOT DROP: ICD-10-CM

## 2025-05-15 DIAGNOSIS — M54.16 LUMBAR RADICULAR PAIN: ICD-10-CM

## 2025-05-15 DIAGNOSIS — Z98.1 HISTORY OF LUMBAR FUSION: Primary | ICD-10-CM

## 2025-05-15 DIAGNOSIS — M48.061 SPINAL STENOSIS OF LUMBAR REGION, UNSPECIFIED WHETHER NEUROGENIC CLAUDICATION PRESENT: ICD-10-CM

## 2025-05-15 PROCEDURE — 3017F COLORECTAL CA SCREEN DOC REV: CPT | Performed by: ORTHOPAEDIC SURGERY

## 2025-05-15 PROCEDURE — G8417 CALC BMI ABV UP PARAM F/U: HCPCS | Performed by: ORTHOPAEDIC SURGERY

## 2025-05-15 PROCEDURE — 1036F TOBACCO NON-USER: CPT | Performed by: ORTHOPAEDIC SURGERY

## 2025-05-15 PROCEDURE — G8399 PT W/DXA RESULTS DOCUMENT: HCPCS | Performed by: ORTHOPAEDIC SURGERY

## 2025-05-15 PROCEDURE — 1159F MED LIST DOCD IN RCRD: CPT | Performed by: ORTHOPAEDIC SURGERY

## 2025-05-15 PROCEDURE — G8427 DOCREV CUR MEDS BY ELIG CLIN: HCPCS | Performed by: ORTHOPAEDIC SURGERY

## 2025-05-15 PROCEDURE — 1123F ACP DISCUSS/DSCN MKR DOCD: CPT | Performed by: ORTHOPAEDIC SURGERY

## 2025-05-15 PROCEDURE — 1090F PRES/ABSN URINE INCON ASSESS: CPT | Performed by: ORTHOPAEDIC SURGERY

## 2025-05-15 PROCEDURE — 99213 OFFICE O/P EST LOW 20 MIN: CPT | Performed by: ORTHOPAEDIC SURGERY

## 2025-05-15 PROCEDURE — 1125F AMNT PAIN NOTED PAIN PRSNT: CPT | Performed by: ORTHOPAEDIC SURGERY

## 2025-05-15 NOTE — PROGRESS NOTES
Patient ID: Muna Lomax is a 74 y.o. female    Chief Compliant:  No chief complaint on file.       Diagnostic imaging:    MRI lumbar spine reviewed L2-3 anterior listhesis moderately severe lumbar spinal stenosis L3-4 severe lumbar spinal stenosis severe progression since last MRI history of L4 to sacrum PLIF    Assessment and Plan:  1. History of lumbar fusion    2. Weakness of left lower extremity    3. Left foot drop    4. Lumbar radicular pain    5. Spinal stenosis of lumbar region, unspecified whether neurogenic claudication present      Very rapid progression of L3-4 stenosis given progression at that level in less than a year anticipate 2-3 which is already significantly stenotic with a spondylolisthesis to progress rapidly as well      I offered a L2-3 and L3-4 PLIF with revision 4 to sacrum instrumentation    Benefits, risks, and recovery process were discussed with the patient. Risks include infection, bleeding, neurologic injury, systemic and anesthetic complications, no relief of pain, need for future surgery.    Follow up 2 weeks post-op.    HPI:  This is a 74 y.o. female who presents to the clinic today for follow up evaluation of back and left leg, last saw Lisbet Wilder PA-C..    Acute onset of left radicular leg pain and weakness of left foot.    Patient reports the inability to perform left foot dorsiflexion.    Patient ambulates with a cane    Review of Systems   All other systems reviewed and are negative.      Past History:    Current Outpatient Medications:     gabapentin (NEURONTIN) 300 MG capsule, Take 1 capsule by mouth 3 times daily for 90 doses., Disp: 90 capsule, Rfl: 3    diazePAM (VALIUM) 10 MG tablet, 1 po 1 hour prior to mri, Disp: 1 tablet, Rfl: 0    cloNIDine (CATAPRES) 0.3 MG tablet, TAKE 1 TABLET BY MOUTH AT NIGHT, Disp: 90 tablet, Rfl: 0    simvastatin (ZOCOR) 20 MG tablet, TAKE 1 TABLET BY MOUTH EVERY DAY, Disp: 90 tablet, Rfl: 0    spironolactone (ALDACTONE) 50 MG tablet, TAKE

## 2025-05-16 ENCOUNTER — TELEPHONE (OUTPATIENT)
Dept: ORTHOPEDIC SURGERY | Age: 75
End: 2025-05-16

## 2025-05-16 ENCOUNTER — PREP FOR PROCEDURE (OUTPATIENT)
Dept: ORTHOPEDIC SURGERY | Age: 75
End: 2025-05-16

## 2025-05-16 DIAGNOSIS — M48.061 SPINAL STENOSIS OF LUMBAR REGION, UNSPECIFIED WHETHER NEUROGENIC CLAUDICATION PRESENT: Primary | ICD-10-CM

## 2025-05-16 NOTE — TELEPHONE ENCOUNTER
Called and discussed surgery scheduling with patient.  Information including dates and times given via phone.

## 2025-05-28 DIAGNOSIS — I10 ESSENTIAL HYPERTENSION: ICD-10-CM

## 2025-05-28 RX ORDER — CLONIDINE HYDROCHLORIDE 0.3 MG/1
0.3 TABLET ORAL NIGHTLY
Qty: 90 TABLET | Refills: 0 | Status: SHIPPED | OUTPATIENT
Start: 2025-05-28

## 2025-05-28 RX ORDER — SPIRONOLACTONE 50 MG/1
TABLET, FILM COATED ORAL
Qty: 90 TABLET | Refills: 0 | Status: SHIPPED | OUTPATIENT
Start: 2025-05-28

## 2025-05-28 RX ORDER — SIMVASTATIN 20 MG
20 TABLET ORAL DAILY
Qty: 90 TABLET | Refills: 0 | Status: SHIPPED | OUTPATIENT
Start: 2025-05-28

## 2025-05-28 NOTE — H&P
HISTORY and PHYSICAL  [facility]       NAME:  Muna Lomax  MRN: [id number]   YOB: 1950   Date: 5/29/2025   Age: 74 y.o.  Gender: female       COMPLAINT AND PRESENT HISTORY:     Muna Lomax is 74 y.o.  female, here for preadmission testing related to spinal stenosis of lumbar region, unspecified whether neurogenic claudication present with scheduled LUMBAR LAMINECTOMY FUSION POSTERIOR L2-4/REVISION POSTERIOR INSTRUMENTATION L4-SACRUM per Dr. Vallejo.     Below italics is a portion of office visit note by Dr. Vallejo dated 05/15/25: Reviewed   HPI:  This is a 74 y.o. female who presents to the clinic today for follow up evaluation of back and left leg, last saw Lisebt Wilder PA-C..     Acute onset of left radicular leg pain and weakness of left foot.     Patient reports the inability to perform left foot dorsiflexion.     Patient ambulates with a cane      UPDATE  Symptoms started several weeks ago.  Pt c/o discomfort to mid to left lower back with radiation into left buttocks and upper posterior thigh.  Describes pain as aching  Rating pain 3-4/10.  Takes tylenol with some relief.  Standing, walking and sitting for prolonged periods aggravates pain.  Icing, rest and elevating lower extremities helps alleviate symptoms.  Has numbness and tingling to left leg and foot.  Denies recent fall, injury or trauma.  Denies redness or rash to surgical site.   Denies hx of MRSA infection.    PMHx includes:    Sees Dr. Plasencia for PCP with last visit in December, 2024.     Asthma:  Associated medications include: none  Denies cough, SOB, wheezing, chest tightness.    HTN, HLD, CKD, tachycardia:  Associated medications include: aldactone, zocor, catapres, coreg, low dose aspirin  Denies recent or current chest pain/pressure, palpitations, SOB, headaches, dizziness. Has intermittent lower extremity edema.   Sees [facility] with last visit in December, 2024 with Karyn Manning  (2-3 VIEWS)  Order: [id number]   Status: Final result       Next appt: 05/29/2025 at 08:00 AM in Pre-Admission Testing ([facility] PAT RM 2)       Dx: History of lumbar fusion; Spinal sten...    Test Result Released: Yes (seen)    0 Result Notes  Details    Reading Physician Reading Date Result Priority   Noam Vallejo MD  [phone]     5/1/2025 Routine     Narrative & Impression  AP lateral lumbar spine status post L4 to sacrum PLIF and bilateral total hip arthroplasty and bladder stimulator stable              Exam Ended: 04/29/25 10:26 EDT Last Resulted: 05/01/25 08:18 EDT     PAST MEDICAL HISTORY     Past Medical History:   Diagnosis Date    Arthritis     Asthma     \"winter\" asthma    Bronchitis     CKD (chronic kidney disease) stage 3, GFR 30-59 ml/min ([facility])     Constipation     GERD (gastroesophageal reflux disease)     Hyperlipidemia     Hypertension     Hypokalemia     Overactive bladder     Prolonged emergence from general anesthesia     after last back surgery in 7/2024, had to use narcan to assist in waking up    Tachycardia     \"racing heart\"    Wears dentures     has an upper plate 2 teeth on right and 2 teeth on left    Wears glasses        SURGICAL HISTORY       Past Surgical History:   Procedure Laterality Date    BACK SURGERY  1983 and 1992    neck and lower back    BLADDER SURGERY  2006, 2008    2 surgeries: sling surgery first then another sling and tack bladder up surgery    CATARACT REMOVAL WITH IMPLANT Bilateral     COLONOSCOPY  2015    had polyp, done at [facility]. Dr Naranjo    COLONOSCOPY N/A 08/21/2018    COLONOSCOPY POLYPECTOMY /COLD BX  performed by Aldo Naranjo MD at [facility] OR    COLONOSCOPY N/A 12/13/2021    COLONOSCOPY POLYPECTOMY SNARE/COLD BIOPSY performed by Aldo Naranjo MD at [facility] ENDO    CYST REMOVAL  02/2017    upper rt back.    KNEE ARTHROSCOPY Right     LUMBAR FUSION N/A 07/24/2024    POSTERIOR DECOMPRESSION  INSTRUMENTED FUSION L4-S1 performed by Noam Vallejo MD at [facility]

## 2025-05-28 NOTE — H&P (VIEW-ONLY)
HISTORY and PHYSICAL  [facility]       NAME:  Muna Lomax  MRN: [id number]   YOB: 1950   Date: 5/29/2025   Age: 74 y.o.  Gender: female       COMPLAINT AND PRESENT HISTORY:     Muna Lomax is 74 y.o.  female, here for preadmission testing related to spinal stenosis of lumbar region, unspecified whether neurogenic claudication present with scheduled LUMBAR LAMINECTOMY FUSION POSTERIOR L2-4/REVISION POSTERIOR INSTRUMENTATION L4-SACRUM per Dr. Vallejo.     Below italics is a portion of office visit note by Dr. Vallejo dated 05/15/25: Reviewed   HPI:  This is a 74 y.o. female who presents to the clinic today for follow up evaluation of back and left leg, last saw Lisbet Wilder PA-C..     Acute onset of left radicular leg pain and weakness of left foot.     Patient reports the inability to perform left foot dorsiflexion.     Patient ambulates with a cane      UPDATE  Symptoms started several weeks ago.  Pt c/o discomfort to mid to left lower back with radiation into left buttocks and upper posterior thigh.  Describes pain as aching  Rating pain 3-4/10.  Takes tylenol with some relief.  Standing, walking and sitting for prolonged periods aggravates pain.  Icing, rest and elevating lower extremities helps alleviate symptoms.  Has numbness and tingling to left leg and foot.  Denies recent fall, injury or trauma.  Denies redness or rash to surgical site.   Denies hx of MRSA infection.    PMHx includes:    Sees Dr. Plasencia for PCP with last visit in December, 2024.     Asthma:  Associated medications include: none  Denies cough, SOB, wheezing, chest tightness.    HTN, HLD, CKD, tachycardia:  Associated medications include: aldactone, zocor, catapres, coreg, low dose aspirin  Denies recent or current chest pain/pressure, palpitations, SOB, headaches, dizziness. Has intermittent lower extremity edema.   Sees [facility] with last visit in December, 2024 with Karyn Manning  Vital Sign     Days of Exercise per Week: 3 days     Minutes of Exercise per Session: 30 min   Intimate Partner Violence: Not At Risk (10/2/2022)    Humiliation, Afraid, Rape, and Kick questionnaire     Fear of Current or Ex-Partner: No     Emotionally Abused: No     Physically Abused: No     Sexually Abused: No   Housing Stability: Low Risk  (7/27/2024)    Housing Stability Vital Sign     Unable to Pay for Housing in the Last Year: No     Number of Places Lived in the Last Year: 1     Unstable Housing in the Last Year: No           REVIEW OF SYSTEMS      Allergies   Allergen Reactions    Ciprofloxacin Other (See Comments)     Patient relates only IV    Codeine     Lisinopril     Morphine     Norvasc [Amlodipine Besylate]     Penicillins     Sanctura [Trospium] Nausea Only     Bloated, nauseated    Xarelto [Rivaroxaban]     Macrobid [Nitrofurantoin] Other (See Comments)     headaches       Current Outpatient Medications on File Prior to Encounter   Medication Sig Dispense Refill    spironolactone (ALDACTONE) 50 MG tablet TAKE 1 TABLET BY MOUTH EVERY DAY WITH LUNCH 90 tablet 0    simvastatin (ZOCOR) 20 MG tablet TAKE 1 TABLET BY MOUTH EVERY DAY 90 tablet 0    cloNIDine (CATAPRES) 0.3 MG tablet TAKE 1 TABLET BY MOUTH AT NIGHT 90 tablet 0    gabapentin (NEURONTIN) 300 MG capsule Take 1 capsule by mouth 3 times daily for 90 doses. 90 capsule 3    senna (SENOKOT) 8.6 MG tablet Take 1 tablet by mouth daily as needed for Constipation      carvedilol (COREG) 3.125 MG tablet Take 1 tablet by mouth 2 times daily 180 tablet 4    triamcinolone (KENALOG) 0.1 % ointment Apply topically 2 times daily Apply as needed for vulvar itching 30 g 3    clindamycin (CLEOCIN) 300 MG capsule Patient will take 6 prior to dentist appointment next week.      Magnesium Citrate 125 MG CAPS       Cranberry 200 MG CAPS Take by mouth      Handicap Placard MISC by Does not apply route For orthopedic issues, expires 9/16/2027 1 each 0    Coenzyme Q10

## 2025-05-29 ENCOUNTER — HOSPITAL ENCOUNTER (OUTPATIENT)
Dept: PREADMISSION TESTING | Age: 75
Discharge: HOME | End: 2025-05-29
Payer: MEDICARE

## 2025-05-29 VITALS
WEIGHT: 204 LBS | SYSTOLIC BLOOD PRESSURE: 146 MMHG | HEIGHT: 64 IN | BODY MASS INDEX: 34.83 KG/M2 | RESPIRATION RATE: 16 BRPM | DIASTOLIC BLOOD PRESSURE: 97 MMHG | OXYGEN SATURATION: 96 % | TEMPERATURE: 98.4 F | HEART RATE: 72 BPM

## 2025-05-29 LAB
ANION GAP SERPL CALCULATED.3IONS-SCNC: 12 MMOL/L (ref 9–16)
BASOPHILS # BLD: 0.06 K/UL (ref 0–0.2)
BASOPHILS NFR BLD: 1 % (ref 0–2)
BUN SERPL-MCNC: 15 MG/DL (ref 8–23)
CALCIUM SERPL-MCNC: 9.1 MG/DL (ref 8.6–10.4)
CHLORIDE SERPL-SCNC: 108 MMOL/L (ref 98–107)
CO2 SERPL-SCNC: 23 MMOL/L (ref 20–31)
CREAT SERPL-MCNC: 0.9 MG/DL (ref 0.7–1.2)
EKG ATRIAL RATE: 69 BPM
EKG P AXIS: 24 DEGREES
EKG P-R INTERVAL: 168 MS
EKG Q-T INTERVAL: 412 MS
EKG QRS DURATION: 86 MS
EKG QTC CALCULATION (BAZETT): 441 MS
EKG R AXIS: 0 DEGREES
EKG T AXIS: 25 DEGREES
EKG VENTRICULAR RATE: 69 BPM
EOSINOPHIL # BLD: 0.29 K/UL (ref 0–0.44)
EOSINOPHILS RELATIVE PERCENT: 5 % (ref 0–4)
ERYTHROCYTE [DISTWIDTH] IN BLOOD BY AUTOMATED COUNT: 16.1 % (ref 11.5–14.9)
GFR, ESTIMATED: 67 ML/MIN/1.73M2
GLUCOSE SERPL-MCNC: 100 MG/DL (ref 74–99)
HCT VFR BLD AUTO: 39.6 % (ref 36–46)
HGB BLD-MCNC: 12.4 G/DL (ref 12–16)
IMM GRANULOCYTES # BLD AUTO: <0.03 K/UL (ref 0–0.3)
IMM GRANULOCYTES NFR BLD: 0 %
LYMPHOCYTES NFR BLD: 1.57 K/UL (ref 1.1–3.7)
LYMPHOCYTES RELATIVE PERCENT: 26 % (ref 24–44)
MCH RBC QN AUTO: 24.8 PG (ref 26–34)
MCHC RBC AUTO-ENTMCNC: 31.3 G/DL (ref 31–37)
MCV RBC AUTO: 79.2 FL (ref 80–100)
MONOCYTES NFR BLD: 0.57 K/UL (ref 0.1–1.2)
MONOCYTES NFR BLD: 10 % (ref 3–12)
NEUTROPHILS NFR BLD: 58 % (ref 36–66)
NEUTS SEG NFR BLD: 3.44 K/UL (ref 1.5–8.1)
NRBC BLD-RTO: 0 PER 100 WBC
PLATELET # BLD AUTO: 327 K/UL (ref 150–450)
PMV BLD AUTO: 9.2 FL (ref 8–13.5)
POTASSIUM SERPL-SCNC: 4.5 MMOL/L (ref 3.7–5.3)
RBC # BLD AUTO: 5 M/UL (ref 3.95–5.11)
SODIUM SERPL-SCNC: 143 MMOL/L (ref 136–145)
WBC OTHER # BLD: 5.9 K/UL (ref 3.5–11)

## 2025-05-29 PROCEDURE — APPSS45 APP SPLIT SHARED TIME 31-45 MINUTES: Performed by: NURSE PRACTITIONER

## 2025-05-29 PROCEDURE — 85025 COMPLETE CBC W/AUTO DIFF WBC: CPT

## 2025-05-29 PROCEDURE — 93010 ELECTROCARDIOGRAM REPORT: CPT | Performed by: INTERNAL MEDICINE

## 2025-05-29 PROCEDURE — 93005 ELECTROCARDIOGRAM TRACING: CPT | Performed by: ANESTHESIOLOGY

## 2025-05-29 PROCEDURE — 36415 COLL VENOUS BLD VENIPUNCTURE: CPT

## 2025-05-29 PROCEDURE — 80048 BASIC METABOLIC PNL TOTAL CA: CPT

## 2025-05-29 ASSESSMENT — PAIN DESCRIPTION - ORIENTATION: ORIENTATION: MID;LOWER

## 2025-05-29 ASSESSMENT — PAIN DESCRIPTION - DESCRIPTORS: DESCRIPTORS: ACHING;NUMBNESS

## 2025-05-29 ASSESSMENT — PAIN DESCRIPTION - PAIN TYPE: TYPE: ACUTE PAIN

## 2025-05-29 ASSESSMENT — ENCOUNTER SYMPTOMS
NAUSEA: 0
CONSTIPATION: 1
WHEEZING: 0
SHORTNESS OF BREATH: 0
COUGH: 0
DIARRHEA: 0
VOMITING: 0
SORE THROAT: 0
ABDOMINAL PAIN: 0

## 2025-05-29 ASSESSMENT — PAIN SCALES - GENERAL: PAINLEVEL_OUTOF10: 3

## 2025-05-29 ASSESSMENT — PAIN DESCRIPTION - LOCATION: LOCATION: BACK;BUTTOCKS

## 2025-05-29 NOTE — PROGRESS NOTES
Patient has a bladder stimulator and will turn it off prior to surgery, pt was informed to bring the remote with her the day of surgery as well.

## 2025-05-29 NOTE — DISCHARGE INSTRUCTIONS
Pre-op Instructions For Patient Being Admitted After Surgery    Medication Instructions:  Please stop herbs and any supplements now (includes vitamins and minerals).    For these medications:  Dulaglutide (Trulicity), Exenatide (Byetta and Bydureon, Liraglutide (Victoza), Lixisenatide (Adlyxin), Semaglutide (Ozempic and Rybelsus), Tirzepatide (Mounjaro, Zepbound, Wegovy)- Stop 1 week prior if taking weekly or 1 day prior if taking every 12 hours or daily.     Please contact your surgeon and prescribing physician for pre-op instructions for any blood thinners. Aspirin as directed.    If you have inhalers/aerosol treatments at home, please use them the morning of your surgery and bring the inhalers with you to the hospital.    Please take the following medications the morning of your surgery with a sip of water: gabapentin, carvedilol, nexium, clonidine    Surgery Instructions:  After midnight before surgery:  Do not eat or drink anything, including water, mints, gum, and hard candy.  You may brush your teeth without swallowing.  No smoking, chewing tobacco, or street drugs.     ** Please Follow Bowel Prep instructions if given by surgeon's office**    Please shower or bathe before surgery.  If you were given Surgical Scrub Chlorhexidine Gluconate Liquid (CHG), please shower the night before and the morning of your surgery following the detailed instructions you received during your pre-admission visit.     Please do not wear any cologne, lotion, powder, deodorant, jewelry, piercings, perfume, makeup, nail polish, hair accessories, or hair spray on the day of surgery.  Wear loose comfortable clothing.    Leave your valuables at home.  Bring a storage case for any glasses/contacts.    The Day of Surgery:  Arrive at [facility] Surgery Entrance at the time directed by your surgeon and check in at the desk.    If you have a living will or healthcare power of , please bring a copy.    You will be

## 2025-06-06 ENCOUNTER — OFFICE VISIT (OUTPATIENT)
Dept: PODIATRY | Age: 75
End: 2025-06-06

## 2025-06-06 ENCOUNTER — ANESTHESIA EVENT (OUTPATIENT)
Dept: OPERATING ROOM | Age: 75
DRG: 428 | End: 2025-06-06
Payer: MEDICARE

## 2025-06-06 VITALS — HEIGHT: 64 IN | WEIGHT: 204 LBS | BODY MASS INDEX: 34.83 KG/M2

## 2025-06-06 DIAGNOSIS — B35.1 ONYCHOMYCOSIS OF TOENAIL: Primary | ICD-10-CM

## 2025-06-06 DIAGNOSIS — M79.675 PAIN OF TOES OF BOTH FEET: ICD-10-CM

## 2025-06-06 DIAGNOSIS — M79.674 PAIN OF TOES OF BOTH FEET: ICD-10-CM

## 2025-06-06 ASSESSMENT — ENCOUNTER SYMPTOMS
DIARRHEA: 0
SHORTNESS OF BREATH: 0
NAUSEA: 0
COLOR CHANGE: 0
BACK PAIN: 0

## 2025-06-06 NOTE — PROGRESS NOTES
SUBJECTIVE: Muna Lomax is a 74 y.o. female who returns to the office with chief complaint of painful fungal toenails. Patient relates toe nails are thickened/difficult to trim as well as painful with ambulation and with shoe gear.   Chief Complaint   Patient presents with    Nail Problem     Nail trim/last saw Dr.Patricia Plasencia 12/16/24    Foot Pain     Left heel      Review of Systems   Constitutional:  Negative for activity change, appetite change, chills, diaphoresis, fatigue and fever.   Respiratory:  Negative for shortness of breath.    Cardiovascular:  Negative for leg swelling.   Gastrointestinal:  Negative for diarrhea and nausea.   Endocrine: Negative for cold intolerance, heat intolerance and polyuria.   Musculoskeletal:  Positive for arthralgias. Negative for back pain, gait problem, joint swelling and myalgias.   Skin:  Negative for color change, pallor, rash and wound.   Allergic/Immunologic: Negative for environmental allergies and food allergies.   Neurological:  Negative for dizziness, weakness, light-headedness and numbness.   Hematological:  Does not bruise/bleed easily.   Psychiatric/Behavioral:  Negative for behavioral problems, confusion and self-injury. The patient is not nervous/anxious.      OBJECTIVE: Clinical evaluation of patient reveals nails 1,2,3,4,5 of the right foot and nails 1,2,3,4,5 of the left foot to present with thickness, elongation, discoloration, brittleness, and subungual debris. There was pain with palpation and debridement of the toenails of the bilateral feet. No open lesions noted to either foot today.     Class A Findings (1 needed)   [] Non-traumatic amputation of foot or integral skeleton portion thereof.   [] Q7.      Class B Findings (2 needed)   1. [] Absent posterior tibial pulse   2. [] Absent dorsalis pedis pulse   3. [] Advanced trophic changes; three of the following are required:   ·         [] hair growth (decrease or absence)   ·         [] nail changes

## 2025-06-09 ENCOUNTER — HOSPITAL ENCOUNTER (INPATIENT)
Age: 75
LOS: 3 days | Discharge: HOME | DRG: 428 | End: 2025-06-12
Attending: ORTHOPAEDIC SURGERY | Admitting: ORTHOPAEDIC SURGERY
Payer: MEDICARE

## 2025-06-09 ENCOUNTER — ANESTHESIA (OUTPATIENT)
Dept: OPERATING ROOM | Age: 75
DRG: 428 | End: 2025-06-09
Payer: MEDICARE

## 2025-06-09 ENCOUNTER — APPOINTMENT (OUTPATIENT)
Dept: GENERAL RADIOLOGY | Age: 75
DRG: 428 | End: 2025-06-09
Attending: ORTHOPAEDIC SURGERY
Payer: MEDICARE

## 2025-06-09 DIAGNOSIS — Z98.1 HISTORY OF LUMBAR FUSION: Primary | ICD-10-CM

## 2025-06-09 DIAGNOSIS — M48.061 SPINAL STENOSIS OF LUMBAR REGION, UNSPECIFIED WHETHER NEUROGENIC CLAUDICATION PRESENT: ICD-10-CM

## 2025-06-09 PROBLEM — M48.062 LUMBAR STENOSIS WITH NEUROGENIC CLAUDICATION: Status: ACTIVE | Noted: 2025-06-09

## 2025-06-09 PROCEDURE — 3700000000 HC ANESTHESIA ATTENDED CARE: Performed by: ORTHOPAEDIC SURGERY

## 2025-06-09 PROCEDURE — 0SG10AJ FUSION OF 2 OR MORE LUMBAR VERTEBRAL JOINTS WITH INTERBODY FUSION DEVICE, POSTERIOR APPROACH, ANTERIOR COLUMN, OPEN APPROACH: ICD-10-PCS | Performed by: ORTHOPAEDIC SURGERY

## 2025-06-09 PROCEDURE — 6370000000 HC RX 637 (ALT 250 FOR IP): Performed by: ORTHOPAEDIC SURGERY

## 2025-06-09 PROCEDURE — 2580000003 HC RX 258: Performed by: ANESTHESIOLOGY

## 2025-06-09 PROCEDURE — 3600000013 HC SURGERY LEVEL 3 ADDTL 15MIN: Performed by: ORTHOPAEDIC SURGERY

## 2025-06-09 PROCEDURE — 6360000002 HC RX W HCPCS: Performed by: ORTHOPAEDIC SURGERY

## 2025-06-09 PROCEDURE — 1200000000 HC SEMI PRIVATE

## 2025-06-09 PROCEDURE — 6360000002 HC RX W HCPCS: Performed by: ANESTHESIOLOGY

## 2025-06-09 PROCEDURE — 0SB20ZZ EXCISION OF LUMBAR VERTEBRAL DISC, OPEN APPROACH: ICD-10-PCS | Performed by: ORTHOPAEDIC SURGERY

## 2025-06-09 PROCEDURE — 6370000000 HC RX 637 (ALT 250 FOR IP): Performed by: ANESTHESIOLOGY

## 2025-06-09 PROCEDURE — C1889 IMPLANT/INSERT DEVICE, NOC: HCPCS | Performed by: ORTHOPAEDIC SURGERY

## 2025-06-09 PROCEDURE — 7100000001 HC PACU RECOVERY - ADDTL 15 MIN: Performed by: ORTHOPAEDIC SURGERY

## 2025-06-09 PROCEDURE — 2500000003 HC RX 250 WO HCPCS: Performed by: ORTHOPAEDIC SURGERY

## 2025-06-09 PROCEDURE — 0SG1071 FUSION OF 2 OR MORE LUMBAR VERTEBRAL JOINTS WITH AUTOLOGOUS TISSUE SUBSTITUTE, POSTERIOR APPROACH, POSTERIOR COLUMN, OPEN APPROACH: ICD-10-PCS | Performed by: ORTHOPAEDIC SURGERY

## 2025-06-09 PROCEDURE — C1713 ANCHOR/SCREW BN/BN,TIS/BN: HCPCS | Performed by: ORTHOPAEDIC SURGERY

## 2025-06-09 PROCEDURE — 6360000002 HC RX W HCPCS: Performed by: NURSE ANESTHETIST, CERTIFIED REGISTERED

## 2025-06-09 PROCEDURE — 3600000003 HC SURGERY LEVEL 3 BASE: Performed by: ORTHOPAEDIC SURGERY

## 2025-06-09 PROCEDURE — 2500000003 HC RX 250 WO HCPCS: Performed by: NURSE ANESTHETIST, CERTIFIED REGISTERED

## 2025-06-09 PROCEDURE — 87086 URINE CULTURE/COLONY COUNT: CPT

## 2025-06-09 PROCEDURE — 7100000000 HC PACU RECOVERY - FIRST 15 MIN: Performed by: ORTHOPAEDIC SURGERY

## 2025-06-09 PROCEDURE — 3700000001 HC ADD 15 MINUTES (ANESTHESIA): Performed by: ORTHOPAEDIC SURGERY

## 2025-06-09 PROCEDURE — 2720000010 HC SURG SUPPLY STERILE: Performed by: ORTHOPAEDIC SURGERY

## 2025-06-09 PROCEDURE — 0QH104Z INSERTION OF INTERNAL FIXATION DEVICE INTO SACRUM, OPEN APPROACH: ICD-10-PCS | Performed by: ORTHOPAEDIC SURGERY

## 2025-06-09 PROCEDURE — 2709999900 HC NON-CHARGEABLE SUPPLY: Performed by: ORTHOPAEDIC SURGERY

## 2025-06-09 DEVICE — SABLE SPACER, 10X26, 10-17MM, 15°
Type: IMPLANTABLE DEVICE | Site: SPINE LUMBAR | Status: FUNCTIONAL
Brand: SABLE

## 2025-06-09 DEVICE — IMPLANTABLE DEVICE: Type: IMPLANTABLE DEVICE | Site: SPINE LUMBAR | Status: FUNCTIONAL

## 2025-06-09 DEVICE — ROD SPNL CONTOURED 5.5X125 MM LUMBAR TI DENALI: Type: IMPLANTABLE DEVICE | Site: SPINE LUMBAR | Status: FUNCTIONAL

## 2025-06-09 DEVICE — SCREW SET EVEREST: Type: IMPLANTABLE DEVICE | Site: SPINE LUMBAR | Status: FUNCTIONAL

## 2025-06-09 DEVICE — SABLE SPACER, 10X26, 9-16MM, 8°
Type: IMPLANTABLE DEVICE | Site: SPINE LUMBAR | Status: FUNCTIONAL
Brand: SABLE

## 2025-06-09 RX ORDER — PROPOFOL 10 MG/ML
INJECTION, EMULSION INTRAVENOUS
Status: DISCONTINUED | OUTPATIENT
Start: 2025-06-09 | End: 2025-06-09 | Stop reason: SDUPTHER

## 2025-06-09 RX ORDER — TRANEXAMIC ACID 100 MG/ML
INJECTION, SOLUTION INTRAVENOUS
Status: DISCONTINUED | OUTPATIENT
Start: 2025-06-09 | End: 2025-06-09 | Stop reason: SDUPTHER

## 2025-06-09 RX ORDER — ASCORBIC ACID 500 MG
500 TABLET ORAL DAILY
Status: DISCONTINUED | OUTPATIENT
Start: 2025-06-09 | End: 2025-06-12 | Stop reason: HOSPADM

## 2025-06-09 RX ORDER — FENTANYL CITRATE 0.05 MG/ML
25 INJECTION, SOLUTION INTRAMUSCULAR; INTRAVENOUS EVERY 5 MIN PRN
Status: DISCONTINUED | OUTPATIENT
Start: 2025-06-09 | End: 2025-06-09 | Stop reason: HOSPADM

## 2025-06-09 RX ORDER — SUCCINYLCHOLINE/SOD CL,ISO/PF 200MG/10ML
SYRINGE (ML) INTRAVENOUS
Status: DISCONTINUED | OUTPATIENT
Start: 2025-06-09 | End: 2025-06-09 | Stop reason: SDUPTHER

## 2025-06-09 RX ORDER — ACETAMINOPHEN 325 MG/1
650 TABLET ORAL EVERY 6 HOURS
Status: DISCONTINUED | OUTPATIENT
Start: 2025-06-09 | End: 2025-06-12 | Stop reason: HOSPADM

## 2025-06-09 RX ORDER — MIDAZOLAM HYDROCHLORIDE 2 MG/2ML
INJECTION, SOLUTION INTRAMUSCULAR; INTRAVENOUS
Status: DISCONTINUED | OUTPATIENT
Start: 2025-06-09 | End: 2025-06-09 | Stop reason: SDUPTHER

## 2025-06-09 RX ORDER — DEXAMETHASONE SODIUM PHOSPHATE 4 MG/ML
INJECTION, SOLUTION INTRA-ARTICULAR; INTRALESIONAL; INTRAMUSCULAR; INTRAVENOUS; SOFT TISSUE
Status: DISCONTINUED | OUTPATIENT
Start: 2025-06-09 | End: 2025-06-09 | Stop reason: SDUPTHER

## 2025-06-09 RX ORDER — OXYCODONE HYDROCHLORIDE 5 MG/1
5 TABLET ORAL EVERY 4 HOURS PRN
Status: DISCONTINUED | OUTPATIENT
Start: 2025-06-09 | End: 2025-06-12 | Stop reason: HOSPADM

## 2025-06-09 RX ORDER — SODIUM CHLORIDE 0.9 % (FLUSH) 0.9 %
5-40 SYRINGE (ML) INJECTION EVERY 12 HOURS SCHEDULED
Status: DISCONTINUED | OUTPATIENT
Start: 2025-06-09 | End: 2025-06-12 | Stop reason: HOSPADM

## 2025-06-09 RX ORDER — LIDOCAINE HYDROCHLORIDE 20 MG/ML
INJECTION, SOLUTION EPIDURAL; INFILTRATION; INTRACAUDAL; PERINEURAL
Status: DISCONTINUED | OUTPATIENT
Start: 2025-06-09 | End: 2025-06-09 | Stop reason: SDUPTHER

## 2025-06-09 RX ORDER — ONDANSETRON 2 MG/ML
INJECTION INTRAMUSCULAR; INTRAVENOUS
Status: DISCONTINUED | OUTPATIENT
Start: 2025-06-09 | End: 2025-06-09 | Stop reason: SDUPTHER

## 2025-06-09 RX ORDER — GABAPENTIN 100 MG/1
100 CAPSULE ORAL ONCE
Status: DISCONTINUED | OUTPATIENT
Start: 2025-06-09 | End: 2025-06-09 | Stop reason: HOSPADM

## 2025-06-09 RX ORDER — ONDANSETRON 2 MG/ML
4 INJECTION INTRAMUSCULAR; INTRAVENOUS
Status: DISCONTINUED | OUTPATIENT
Start: 2025-06-09 | End: 2025-06-09 | Stop reason: HOSPADM

## 2025-06-09 RX ORDER — SODIUM CHLORIDE 9 MG/ML
INJECTION, SOLUTION INTRAVENOUS PRN
Status: DISCONTINUED | OUTPATIENT
Start: 2025-06-09 | End: 2025-06-12 | Stop reason: HOSPADM

## 2025-06-09 RX ORDER — PANTOPRAZOLE SODIUM 40 MG/1
40 TABLET, DELAYED RELEASE ORAL
Status: DISCONTINUED | OUTPATIENT
Start: 2025-06-10 | End: 2025-06-12 | Stop reason: HOSPADM

## 2025-06-09 RX ORDER — ACETAMINOPHEN 500 MG
1000 TABLET ORAL ONCE
Status: COMPLETED | OUTPATIENT
Start: 2025-06-09 | End: 2025-06-09

## 2025-06-09 RX ORDER — SODIUM CHLORIDE 0.9 % (FLUSH) 0.9 %
5-40 SYRINGE (ML) INJECTION PRN
Status: DISCONTINUED | OUTPATIENT
Start: 2025-06-09 | End: 2025-06-09 | Stop reason: HOSPADM

## 2025-06-09 RX ORDER — LIDOCAINE HYDROCHLORIDE 10 MG/ML
1 INJECTION, SOLUTION EPIDURAL; INFILTRATION; INTRACAUDAL; PERINEURAL
Status: COMPLETED | OUTPATIENT
Start: 2025-06-09 | End: 2025-06-09

## 2025-06-09 RX ORDER — ATORVASTATIN CALCIUM 20 MG/1
20 TABLET, FILM COATED ORAL DAILY
Status: DISCONTINUED | OUTPATIENT
Start: 2025-06-09 | End: 2025-06-12 | Stop reason: HOSPADM

## 2025-06-09 RX ORDER — SPIRONOLACTONE 25 MG/1
50 TABLET ORAL DAILY
Status: DISCONTINUED | OUTPATIENT
Start: 2025-06-09 | End: 2025-06-12 | Stop reason: HOSPADM

## 2025-06-09 RX ORDER — NALOXONE HYDROCHLORIDE 0.4 MG/ML
INJECTION, SOLUTION INTRAMUSCULAR; INTRAVENOUS; SUBCUTANEOUS PRN
Status: DISCONTINUED | OUTPATIENT
Start: 2025-06-09 | End: 2025-06-09 | Stop reason: HOSPADM

## 2025-06-09 RX ORDER — METOCLOPRAMIDE HYDROCHLORIDE 5 MG/ML
10 INJECTION INTRAMUSCULAR; INTRAVENOUS
Status: DISCONTINUED | OUTPATIENT
Start: 2025-06-09 | End: 2025-06-09 | Stop reason: HOSPADM

## 2025-06-09 RX ORDER — EPHEDRINE SULFATE/0.9% NACL/PF 25 MG/5 ML
SYRINGE (ML) INTRAVENOUS
Status: DISCONTINUED | OUTPATIENT
Start: 2025-06-09 | End: 2025-06-09 | Stop reason: SDUPTHER

## 2025-06-09 RX ORDER — OXYCODONE HYDROCHLORIDE 10 MG/1
10 TABLET ORAL EVERY 4 HOURS PRN
Status: DISCONTINUED | OUTPATIENT
Start: 2025-06-09 | End: 2025-06-12 | Stop reason: HOSPADM

## 2025-06-09 RX ORDER — GABAPENTIN 300 MG/1
300 CAPSULE ORAL 3 TIMES DAILY
Status: DISCONTINUED | OUTPATIENT
Start: 2025-06-09 | End: 2025-06-12 | Stop reason: HOSPADM

## 2025-06-09 RX ORDER — CARVEDILOL 3.12 MG/1
3.12 TABLET ORAL 2 TIMES DAILY
Status: DISCONTINUED | OUTPATIENT
Start: 2025-06-09 | End: 2025-06-12 | Stop reason: HOSPADM

## 2025-06-09 RX ORDER — SODIUM CHLORIDE 9 MG/ML
INJECTION, SOLUTION INTRAVENOUS PRN
Status: DISCONTINUED | OUTPATIENT
Start: 2025-06-09 | End: 2025-06-09 | Stop reason: HOSPADM

## 2025-06-09 RX ORDER — SODIUM CHLORIDE 0.9 % (FLUSH) 0.9 %
5-40 SYRINGE (ML) INJECTION EVERY 12 HOURS SCHEDULED
Status: DISCONTINUED | OUTPATIENT
Start: 2025-06-09 | End: 2025-06-09 | Stop reason: HOSPADM

## 2025-06-09 RX ORDER — SODIUM CHLORIDE, SODIUM LACTATE, POTASSIUM CHLORIDE, CALCIUM CHLORIDE 600; 310; 30; 20 MG/100ML; MG/100ML; MG/100ML; MG/100ML
INJECTION, SOLUTION INTRAVENOUS CONTINUOUS
Status: DISCONTINUED | OUTPATIENT
Start: 2025-06-09 | End: 2025-06-09 | Stop reason: HOSPADM

## 2025-06-09 RX ORDER — SENNOSIDES 8.6 MG/1
1 TABLET ORAL NIGHTLY PRN
Status: DISCONTINUED | OUTPATIENT
Start: 2025-06-09 | End: 2025-06-12 | Stop reason: HOSPADM

## 2025-06-09 RX ORDER — DIPHENHYDRAMINE HYDROCHLORIDE 50 MG/ML
12.5 INJECTION, SOLUTION INTRAMUSCULAR; INTRAVENOUS
Status: DISCONTINUED | OUTPATIENT
Start: 2025-06-09 | End: 2025-06-09 | Stop reason: HOSPADM

## 2025-06-09 RX ORDER — SODIUM CHLORIDE 0.9 % (FLUSH) 0.9 %
5-40 SYRINGE (ML) INJECTION PRN
Status: DISCONTINUED | OUTPATIENT
Start: 2025-06-09 | End: 2025-06-12 | Stop reason: HOSPADM

## 2025-06-09 RX ORDER — FENTANYL CITRATE 50 UG/ML
INJECTION, SOLUTION INTRAMUSCULAR; INTRAVENOUS
Status: DISCONTINUED | OUTPATIENT
Start: 2025-06-09 | End: 2025-06-09 | Stop reason: SDUPTHER

## 2025-06-09 RX ORDER — BUPIVACAINE HYDROCHLORIDE AND EPINEPHRINE 2.5; 5 MG/ML; UG/ML
INJECTION, SOLUTION EPIDURAL; INFILTRATION; INTRACAUDAL; PERINEURAL PRN
Status: DISCONTINUED | OUTPATIENT
Start: 2025-06-09 | End: 2025-06-09 | Stop reason: ALTCHOICE

## 2025-06-09 RX ORDER — VANCOMYCIN HYDROCHLORIDE 1 G/20ML
INJECTION, POWDER, LYOPHILIZED, FOR SOLUTION INTRAVENOUS PRN
Status: DISCONTINUED | OUTPATIENT
Start: 2025-06-09 | End: 2025-06-09 | Stop reason: ALTCHOICE

## 2025-06-09 RX ADMIN — FENTANYL CITRATE 50 MCG: 50 INJECTION INTRAMUSCULAR; INTRAVENOUS at 07:34

## 2025-06-09 RX ADMIN — ATORVASTATIN CALCIUM 20 MG: 20 TABLET, FILM COATED ORAL at 15:24

## 2025-06-09 RX ADMIN — CARVEDILOL 3.12 MG: 3.12 TABLET, FILM COATED ORAL at 20:43

## 2025-06-09 RX ADMIN — CLONIDINE HYDROCHLORIDE 0.3 MG: 0.2 TABLET ORAL at 20:50

## 2025-06-09 RX ADMIN — LIDOCAINE HYDROCHLORIDE 1 ML: 10 INJECTION, SOLUTION EPIDURAL; INFILTRATION; INTRACAUDAL; PERINEURAL at 07:10

## 2025-06-09 RX ADMIN — SODIUM CHLORIDE, POTASSIUM CHLORIDE, SODIUM LACTATE AND CALCIUM CHLORIDE: 600; 310; 30; 20 INJECTION, SOLUTION INTRAVENOUS at 11:30

## 2025-06-09 RX ADMIN — FENTANYL CITRATE 50 MCG: 50 INJECTION INTRAMUSCULAR; INTRAVENOUS at 09:22

## 2025-06-09 RX ADMIN — HYDROMORPHONE HYDROCHLORIDE 0.5 MG: 1 INJECTION, SOLUTION INTRAMUSCULAR; INTRAVENOUS; SUBCUTANEOUS at 12:52

## 2025-06-09 RX ADMIN — Medication 2000 MG: at 11:39

## 2025-06-09 RX ADMIN — PROPOFOL 100 MCG/KG/MIN: 10 INJECTION, EMULSION INTRAVENOUS at 07:54

## 2025-06-09 RX ADMIN — OXYCODONE HYDROCHLORIDE 10 MG: 10 TABLET ORAL at 19:45

## 2025-06-09 RX ADMIN — ONDANSETRON 4 MG: 2 INJECTION, SOLUTION INTRAMUSCULAR; INTRAVENOUS at 11:19

## 2025-06-09 RX ADMIN — PROPOFOL 160 MG: 10 INJECTION, EMULSION INTRAVENOUS at 07:34

## 2025-06-09 RX ADMIN — HYDROMORPHONE HYDROCHLORIDE 0.25 MG: 1 INJECTION, SOLUTION INTRAMUSCULAR; INTRAVENOUS; SUBCUTANEOUS at 15:32

## 2025-06-09 RX ADMIN — ACETAMINOPHEN 650 MG: 325 TABLET ORAL at 14:18

## 2025-06-09 RX ADMIN — OXYCODONE HYDROCHLORIDE 10 MG: 10 TABLET ORAL at 14:18

## 2025-06-09 RX ADMIN — SPIRONOLACTONE 50 MG: 25 TABLET ORAL at 15:25

## 2025-06-09 RX ADMIN — Medication 2000 MG: at 07:39

## 2025-06-09 RX ADMIN — HYDROMORPHONE HYDROCHLORIDE 0.5 MG: 1 INJECTION, SOLUTION INTRAMUSCULAR; INTRAVENOUS; SUBCUTANEOUS at 12:29

## 2025-06-09 RX ADMIN — Medication 140 MG: at 07:34

## 2025-06-09 RX ADMIN — GABAPENTIN 300 MG: 300 CAPSULE ORAL at 20:43

## 2025-06-09 RX ADMIN — Medication 20 MG: at 09:59

## 2025-06-09 RX ADMIN — OXYCODONE HYDROCHLORIDE AND ACETAMINOPHEN 500 MG: 500 TABLET ORAL at 15:25

## 2025-06-09 RX ADMIN — ACETAMINOPHEN 1000 MG: 500 TABLET ORAL at 07:11

## 2025-06-09 RX ADMIN — DEXAMETHASONE SODIUM PHOSPHATE 4 MG: 4 INJECTION INTRA-ARTICULAR; INTRALESIONAL; INTRAMUSCULAR; INTRAVENOUS; SOFT TISSUE at 10:45

## 2025-06-09 RX ADMIN — ACETAMINOPHEN 650 MG: 325 TABLET ORAL at 20:43

## 2025-06-09 RX ADMIN — Medication 2000 MG: at 20:43

## 2025-06-09 RX ADMIN — MIDAZOLAM HYDROCHLORIDE 2 MG: 1 INJECTION, SOLUTION INTRAMUSCULAR; INTRAVENOUS at 07:28

## 2025-06-09 RX ADMIN — SODIUM CHLORIDE, POTASSIUM CHLORIDE, SODIUM LACTATE AND CALCIUM CHLORIDE: 600; 310; 30; 20 INJECTION, SOLUTION INTRAVENOUS at 07:07

## 2025-06-09 RX ADMIN — SODIUM CHLORIDE, PRESERVATIVE FREE 10 ML: 5 INJECTION INTRAVENOUS at 20:43

## 2025-06-09 RX ADMIN — GABAPENTIN 300 MG: 300 CAPSULE ORAL at 15:24

## 2025-06-09 RX ADMIN — LIDOCAINE HYDROCHLORIDE 100 MG: 20 INJECTION, SOLUTION EPIDURAL; INFILTRATION; INTRACAUDAL; PERINEURAL at 07:34

## 2025-06-09 RX ADMIN — TRANEXAMIC ACID 1000 MG: 100 INJECTION, SOLUTION INTRAVENOUS at 08:05

## 2025-06-09 RX ADMIN — TRANEXAMIC ACID 1000 MG: 100 INJECTION, SOLUTION INTRAVENOUS at 10:45

## 2025-06-09 RX ADMIN — EPHEDRINE SULFATE 10 MG: 5 INJECTION INTRAVENOUS at 08:02

## 2025-06-09 ASSESSMENT — PAIN SCALES - GENERAL
PAINLEVEL_OUTOF10: 2
PAINLEVEL_OUTOF10: 7
PAINLEVEL_OUTOF10: 4
PAINLEVEL_OUTOF10: 3
PAINLEVEL_OUTOF10: 7
PAINLEVEL_OUTOF10: 10
PAINLEVEL_OUTOF10: 8
PAINLEVEL_OUTOF10: 0
PAINLEVEL_OUTOF10: 6

## 2025-06-09 ASSESSMENT — PAIN DESCRIPTION - ORIENTATION
ORIENTATION: LOWER
ORIENTATION: LEFT;LOWER
ORIENTATION: LOWER

## 2025-06-09 ASSESSMENT — PAIN DESCRIPTION - PAIN TYPE
TYPE: SURGICAL PAIN

## 2025-06-09 ASSESSMENT — PAIN DESCRIPTION - LOCATION
LOCATION: BACK
LOCATION: BACK;BUTTOCKS;LEG

## 2025-06-09 ASSESSMENT — PAIN DESCRIPTION - DESCRIPTORS
DESCRIPTORS: BURNING
DESCRIPTORS: ACHING;SHARP
DESCRIPTORS: ACHING
DESCRIPTORS: BURNING
DESCRIPTORS: ACHING
DESCRIPTORS: BURNING
DESCRIPTORS: ACHING
DESCRIPTORS: ACHING;SHARP
DESCRIPTORS: ACHING

## 2025-06-09 ASSESSMENT — PAIN - FUNCTIONAL ASSESSMENT
PAIN_FUNCTIONAL_ASSESSMENT: PREVENTS OR INTERFERES SOME ACTIVE ACTIVITIES AND ADLS
PAIN_FUNCTIONAL_ASSESSMENT: 0-10
PAIN_FUNCTIONAL_ASSESSMENT: PREVENTS OR INTERFERES SOME ACTIVE ACTIVITIES AND ADLS

## 2025-06-09 ASSESSMENT — PAIN DESCRIPTION - FREQUENCY
FREQUENCY: CONTINUOUS
FREQUENCY: CONTINUOUS

## 2025-06-09 NOTE — OP NOTE
[facility]                [address] [place] [address]                            OPERATIVE REPORT      PATIENT NAME: HALLIE MONTERO               : 1950  MED REC NO: [id number]                          ROOM: [facility]  ACCOUNT NO: [id number]                       ADMIT DATE: 2025  PROVIDER: Noam Vallejo MD      DATE OF PROCEDURE:  2025    SURGEON:  Noam Vallejo MD    PREOPERATIVE DIAGNOSES:  History of L4 to sacrum posterior lumbar interbody fusion; significant lumbar spinal stenosis, L2-3 and L3-4, with a developing spondylolisthesis, L2-3.    POSTOPERATIVE DIAGNOSES:  History of L4 to sacrum posterior lumbar interbody fusion; significant lumbar spinal stenosis, L2-3 and L3-4, with a developing spondylolisthesis, L2-3.    PROCEDURES PERFORMED:  L2-3 and L3-4 posterior decompression with laminectomies, bilateral partial medial facetectomies, neural foraminotomies; posterior lateral fusion, L2-3 and L3-4; posterior interbody fusion, L2-3 and L3-4, with application of vertebral body fusion cages; removal of hardware deep to include 2 rods, 6 cap screws; posterior segmental instrumentation, L2 to the sacrum; local autograft bone grafting with fluoroscopic assistance.    FIRST ASSISTANT:  XAVIER Yu, utilized for patient positioning, wound retraction, aid in construct assemblage and skin closure.    ANESTHESIA:  General.    ESTIMATED BLOOD LOSS:  500.    COMPLICATIONS:  None.    SPECIMEN:  Urine.  Hayes placed at the start of the case, will be removed at the end.    IMPLANTS:  Nargis Everest screw shantal construct as that is what the patient had her index operation.  This was utilized extending up.  However, I did use [employer] expandable intervertebral body fusion cages as they had cages that were only 26 rather than 28 mm long.  These proved to be just about as long as an interbody space would accommodate.    DRAINS:  None.    FINDINGS:    1. The  patient with extremely inflamed facet capsules with subsequent greater than average blood loss.  2. Final AP and lateral fluoroscopic images demonstrated very acceptable disk space height restoration as well as reduction of spondylolisthesis, L3-4.  The retrolisthesis was developing at 3-4 looked good.  Spondylolisthesis at 2-3 was reduced with acceptable disk space height restoration.    PROCEDURE IN DETAIL:  The patient was taken to the operating room, placed under general anesthesia, transferred to the [facility] table, checked for padding and positioning.  18-gauge spinal needle was advanced from the skin to the transverse process on the right at L2, backed off slightly, and a field block was given.  This was then repeated on the left.    Now, a midline incision made, carried down through skin, subcutaneous tissue, paraspinal musculature, elevated out to expose the transverse process at 2-3 and then dissecting lateral to the previous 4 to sacrum construct.  4 to sacrum posterior lateral fusion had not fully matured, but appeared to be maturing.    Lateral gutters decorticated with a high-speed dora.  Soft tissue stripped off the lamina and spinous processes as well as utilized to better define medial facet joints.    Now, with the aid of fluoroscopic assistance, pedicle screws were placed in L2 and L3.  Post placement, screws were checked AP, lateral, and en face fluoroscopically and stimulated with neuromonitoring.  All found to be acceptable.  I believe we used 5.5 screws at 2 as she had pretty narrow pedicles, but otherwise 6.5 set at 3.    At this juncture, a shantal was placed unilaterally temporary and the disk spaces slightly distracted.  Images were checked AP, lateral, and en face and overall alignment at this juncture looked to be quite acceptable.    At this juncture, laminectomies were completed with combination of rongeurs, Kerrisons, and a high-speed redd dora.    Takedown of the medial  grafting, a gram of vancomycin placed in the depths of the wound with a 7 mm ROBER drain.  Deep fascia now reapproximated with #2 Vicryl running closure.  Subcuticular layers subsequently reapproximated with 2-0 Vicryl and 3-0 nylon skin closure.  Sterile dressing was applied.  The patient was awakened from anesthesia, taken to recovery room in stable condition.    Complications arising during the operation, none noted.          NILDA ROD MD      D:  06/09/2025 11:54:25     T:  06/09/2025 13:20:26     OSVALDO/PATI  Job #:  [id number]     Doc#:  [id number]

## 2025-06-09 NOTE — ANESTHESIA PRE PROCEDURE
10/11/2021 06:17 AM           Anesthesia Evaluation  Patient summary reviewed and Nursing notes reviewed   history of anesthetic complications (Prolonged emergence from general anesthesia):   Airway: Mallampati: II  TM distance: >3 FB   Neck ROM: full  Mouth opening: > = 3 FB   Dental:    (+) upper dentures and partials      Pulmonary: breath sounds clear to auscultation  (+)           asthma:                            Cardiovascular:    (+) hypertension: no interval change        Rhythm: regular  Rate: normal  Echocardiogram reviewed               ROS comment: Left Ventricle: Normal left ventricular systolic function with a visually estimated EF of 55 - 60%. EF by 2D Simpsons Biplane is 57%. Left ventricle size is normal. Normal wall thickness. Normal wall motion. Abnormal diastolic function.     Neuro/Psych:   Negative Neuro/Psych ROS               ROS comment: Spinal stenosis of lumbar region GI/Hepatic/Renal:   (+) GERD:          Endo/Other: Negative Endo/Other ROS                    Abdominal:             Vascular: negative vascular ROS.         Other Findings:         Anesthesia Plan      general     ASA 2     (General endotracheal)      MIPS: Postoperative opioids intended and Prophylactic antiemetics administered.  Anesthetic plan and risks discussed with patient.      Plan discussed with CRNA.                Justen Silva MD   6/9/2025

## 2025-06-09 NOTE — ANESTHESIA POSTPROCEDURE EVALUATION
Department of Anesthesiology  Postprocedure Note    Patient: Muna Lomax  MRN: [id number]  YOB: 1950  Date of evaluation: 6/9/2025    Procedure Summary       Date: 06/09/25 Room / Location: [address]    Anesthesia Start: 0731 Anesthesia Stop: 1212    Procedure: LUMBAR LAMINECTOMY FUSION POSTERIOR L2-4/REVISION POSTERIOR INSTRUMENTATION L4-SACRUM (Spine Lumbar) Diagnosis:       Spinal stenosis of lumbar region, unspecified whether neurogenic claudication present      (Spinal stenosis of lumbar region, unspecified whether neurogenic claudication present [M48.061])    Surgeons: Noam Vallejo MD Responsible Provider: Justen Silva MD    Anesthesia Type: general ASA Status: 2            Anesthesia Type: No value filed.    Kevin Phase I: Kevin Score: 8    Kevin Phase II:      Anesthesia Post Evaluation    Patient location during evaluation: PACU  Patient participation: complete - patient participated  Level of consciousness: awake and alert  Airway patency: patent  Nausea & Vomiting: no vomiting  Cardiovascular status: hemodynamically stable  Respiratory status: acceptable  Hydration status: euvolemic  Comments: POST- ANESTHESIA EVALUATION       Pt Name: Muna Lomax  MRN: [id number]  YOB: 1950  Date of evaluation: 6/9/2025  Time:  1:00 PM      /72   Pulse 61   Temp 97.3 °F (36.3 °C) (Infrared)   Resp 12   Ht 1.626 m (5' 4\")   Wt 92.5 kg (204 lb)   SpO2 96%   BMI 35.02 kg/m²      Consciousness Level  Awake  Cardiopulmonary Status  Stable  Pain Adequately Treated YES  Nausea / Vomiting  NO  Adequate Hydration  YES  Anesthesia Related Complications NONE      Electronically signed by Justen Silva MD on 6/9/2025 at 1:00 PM         Pain management: satisfactory to patient    No notable events documented.

## 2025-06-09 NOTE — BRIEF OP NOTE
Brief Postoperative Note      Patient: Muna Lomax  YOB: 1950  MRN: [id number]    Date of Procedure: 6/9/2025    Pre-Op Diagnosis Codes:      * Spinal stenosis of lumbar region, unspecified whether neurogenic claudication present [M48.061] Lumbar stenosis with spondylolisthesis L2-3    Post-Op Diagnosis: Same       Procedure(s):  LUMBAR LAMINECTOMY FUSION POSTERIOR L2-4/REVISION POSTERIOR INSTRUMENTATION L4-SACRUM    L2-3 and L3-4 PLIF with removal prior rods and PSI L2-3      Surgeon(s):  Noam Vallejo MD Price, Taylor, PA    Assistant:  * No surgical staff found *    Anesthesia: General    Estimated Blood Loss (mL): 500    Complications: None    Specimens:   ID Type Source Tests Collected by Time Destination   1 : URINE FROM JARA CATHETER INSERTION Urine Urine, indwelling catheter CULTURE, URINE Noam Vallejo MD 6/9/2025 0806        Implants:  Implant Name Type Inv. Item Serial No.  Lot No. LRB No. Used Action   SCREW SPNL POLYAX 6.5X45 MM MAIA FEN INVASIVE EVEREST - [id number]  SCREW SPNL POLYAX 6.5X45 MM MAIA FEN INVASIVE EVEREST  [employer] HOWM-WD  N/A 2 Implanted   SCREW SPNL POLYAX 5.5X45 MM MAIA FEN INVASIVE EVEREST - [id number]  SCREW SPNL POLYAX 5.5X45 MM MAIA FEN INVASIVE EVEREST  [employer]INE HOWM-WD  N/A 2 Implanted   SCREW SET EVEREST - [id number]  SCREW SET EVEREST  [employer] SPINE HOWM-WD  N/A 10 Implanted   SPACER SPNL 15 DEG [id number] MM SABLE - [id number]  SPACER SPNL 15 DEG [id number] MM SABLE  [employer]-WD [id number] N/A 1 Implanted   SPACER SPNL 15 DEG [id number] MM SABLE - [id number]  SPACER SPNL 15 DEG [id number] MM SABLE  [employer]-WD [id number] N/A 1 Implanted   SPACER SPNL 8 DEG [id number] MM SABLE - [id number]  SPACER SPNL 8 DEG [id number] MM SABLE  [employer]- [id number] N/A 1 Implanted   SPACER SPNL 8 DEG [id number] MM SABLE - [id number]  SPACER SPNL 8 DEG [id number] MM [facility]  [facility] [employer] [facility]-WD [id number] N/A 1  Implanted   ESTRELLA SPNL CONTOURED 5.5X125 MM LUMBAR TI MARITZA - [id number]  ESTRELLA SPNL CONTOURED 5.5X125 MM LUMBAR TI MARITZA  [employer] HOWM-WD  N/A 2 Implanted         Drains:   Closed/Suction Drain Back Bulb (Active)       Urinary Catheter 06/09/25 Hayes (Active)       Findings:  Infection Present At Time Of Surgery (PATOS) (choose all levels that have infection present):  No infection present  Other Findings: see dictation    Electronically signed by Noam Vallejo MD on 6/9/2025 at 11:43 AM

## 2025-06-09 NOTE — INTERVAL H&P NOTE
Update History & Physical    The patient's History and Physical of May 29, 25 was reviewed with the patient and I examined the patient. There was no change. The surgical site was confirmed by the patient and me. Pt undergoing for LUMBAR LAMINECTOMY FUSION POSTERIOR L2-4/REVISION POSTERIOR INSTRUMENTATION L4-SACRUM per Dr. Vallejo.   Pt denies fever/chills, chest pain or SOB   Pt Npo since the past midnight, pt took her am medication with sip of water  Pt denies hx of MRSA infection   Pt denies hx of blood clots in the lungs/legs  Pt stopped taking ASA one week ago  Hx of prolonged emergence from anesthesia. Otherwise, denies personal and family history of complications with anesthesia.   Physical exam remain unchanged including cardiac and pulmonary assessment   See nursing flow sheet for vital sings     Lab Results   Component Value Date    WBC 5.9 05/29/2025    HGB 12.4 05/29/2025    HCT 39.6 05/29/2025    MCV 79.2 (L) 05/29/2025     05/29/2025     Lab Results   Component Value Date/Time     05/29/2025 08:57 AM    K 4.5 05/29/2025 08:57 AM     05/29/2025 08:57 AM    CO2 23 05/29/2025 08:57 AM    BUN 15 05/29/2025 08:57 AM    CREATININE 0.9 05/29/2025 08:57 AM    GLUCOSE 100 05/29/2025 08:57 AM    CALCIUM 9.1 05/29/2025 08:57 AM    LABGLOM 67 05/29/2025 08:57 AM    LABGLOM 53 02/29/2024 09:17 AM        Electronically signed by YARA Davidson CNP on 6/9/2025 at 6:18 AM

## 2025-06-10 LAB
MICROORGANISM SPEC CULT: NO GROWTH
SERVICE CMNT-IMP: NORMAL
SPECIMEN DESCRIPTION: NORMAL

## 2025-06-10 PROCEDURE — 1200000000 HC SEMI PRIVATE

## 2025-06-10 PROCEDURE — 99024 POSTOP FOLLOW-UP VISIT: CPT | Performed by: ORTHOPAEDIC SURGERY

## 2025-06-10 PROCEDURE — 99222 1ST HOSP IP/OBS MODERATE 55: CPT | Performed by: INTERNAL MEDICINE

## 2025-06-10 PROCEDURE — 97530 THERAPEUTIC ACTIVITIES: CPT

## 2025-06-10 PROCEDURE — 2500000003 HC RX 250 WO HCPCS: Performed by: ORTHOPAEDIC SURGERY

## 2025-06-10 PROCEDURE — 6360000002 HC RX W HCPCS: Performed by: ORTHOPAEDIC SURGERY

## 2025-06-10 PROCEDURE — 97162 PT EVAL MOD COMPLEX 30 MIN: CPT

## 2025-06-10 PROCEDURE — 97116 GAIT TRAINING THERAPY: CPT

## 2025-06-10 PROCEDURE — 6370000000 HC RX 637 (ALT 250 FOR IP): Performed by: ORTHOPAEDIC SURGERY

## 2025-06-10 RX ORDER — OXYCODONE AND ACETAMINOPHEN 5; 325 MG/1; MG/1
1 TABLET ORAL EVERY 6 HOURS PRN
Qty: 28 TABLET | Refills: 0 | Status: SHIPPED | OUTPATIENT
Start: 2025-06-10 | End: 2025-06-17

## 2025-06-10 RX ADMIN — ATORVASTATIN CALCIUM 20 MG: 20 TABLET, FILM COATED ORAL at 09:08

## 2025-06-10 RX ADMIN — ACETAMINOPHEN 650 MG: 325 TABLET ORAL at 03:55

## 2025-06-10 RX ADMIN — CARVEDILOL 3.12 MG: 3.12 TABLET, FILM COATED ORAL at 20:27

## 2025-06-10 RX ADMIN — OXYCODONE HYDROCHLORIDE AND ACETAMINOPHEN 500 MG: 500 TABLET ORAL at 09:08

## 2025-06-10 RX ADMIN — ACETAMINOPHEN 650 MG: 325 TABLET ORAL at 13:10

## 2025-06-10 RX ADMIN — Medication 2000 MG: at 03:56

## 2025-06-10 RX ADMIN — OXYCODONE HYDROCHLORIDE 10 MG: 10 TABLET ORAL at 19:21

## 2025-06-10 RX ADMIN — PANTOPRAZOLE SODIUM 40 MG: 40 TABLET, DELAYED RELEASE ORAL at 05:11

## 2025-06-10 RX ADMIN — SPIRONOLACTONE 50 MG: 25 TABLET ORAL at 09:08

## 2025-06-10 RX ADMIN — CLONIDINE HYDROCHLORIDE 0.3 MG: 0.2 TABLET ORAL at 20:27

## 2025-06-10 RX ADMIN — GABAPENTIN 300 MG: 300 CAPSULE ORAL at 09:08

## 2025-06-10 RX ADMIN — OXYCODONE HYDROCHLORIDE 10 MG: 10 TABLET ORAL at 03:58

## 2025-06-10 RX ADMIN — ACETAMINOPHEN 650 MG: 325 TABLET ORAL at 20:28

## 2025-06-10 RX ADMIN — SODIUM CHLORIDE, PRESERVATIVE FREE 10 ML: 5 INJECTION INTRAVENOUS at 09:11

## 2025-06-10 RX ADMIN — ACETAMINOPHEN 650 MG: 325 TABLET ORAL at 09:08

## 2025-06-10 RX ADMIN — GABAPENTIN 300 MG: 300 CAPSULE ORAL at 13:10

## 2025-06-10 RX ADMIN — OXYCODONE HYDROCHLORIDE 10 MG: 10 TABLET ORAL at 13:10

## 2025-06-10 RX ADMIN — CARVEDILOL 3.12 MG: 3.12 TABLET, FILM COATED ORAL at 09:08

## 2025-06-10 RX ADMIN — SODIUM CHLORIDE, PRESERVATIVE FREE 10 ML: 5 INJECTION INTRAVENOUS at 20:28

## 2025-06-10 RX ADMIN — GABAPENTIN 300 MG: 300 CAPSULE ORAL at 20:27

## 2025-06-10 ASSESSMENT — PAIN DESCRIPTION - DESCRIPTORS
DESCRIPTORS: SORE;SHARP
DESCRIPTORS: ACHING;SHARP
DESCRIPTORS: SHOOTING;SORE
DESCRIPTORS: SHARP;SHOOTING

## 2025-06-10 ASSESSMENT — PAIN SCALES - GENERAL
PAINLEVEL_OUTOF10: 8
PAINLEVEL_OUTOF10: 6
PAINLEVEL_OUTOF10: 0
PAINLEVEL_OUTOF10: 7
PAINLEVEL_OUTOF10: 7

## 2025-06-10 ASSESSMENT — PAIN DESCRIPTION - LOCATION
LOCATION: BACK

## 2025-06-10 ASSESSMENT — PAIN DESCRIPTION - ORIENTATION
ORIENTATION: MID;LOWER
ORIENTATION: MID
ORIENTATION: MID;RIGHT

## 2025-06-10 ASSESSMENT — PAIN - FUNCTIONAL ASSESSMENT
PAIN_FUNCTIONAL_ASSESSMENT: PREVENTS OR INTERFERES SOME ACTIVE ACTIVITIES AND ADLS
PAIN_FUNCTIONAL_ASSESSMENT: ACTIVITIES ARE NOT PREVENTED

## 2025-06-10 NOTE — CARE COORDINATION
Patient provided VNS Choices:  [address]  Will send referral.     Electronically signed by Greer Marte RN on 6/10/2025 at 4:53 PM

## 2025-06-10 NOTE — PROGRESS NOTES
Physical Therapy  [facility]   Physical Therapy Evaluation  Date: 6/10/25  Patient Name: Muna Lomax       Room:   MRN: [id number]  Account: [id number]   : 1950  (74 y.o.) Gender: female     Discharge Recommendations:  Discharge Recommendations: Continue to assess pending progress, Patient would benefit from continued therapy after discharge     PT D/C Equipment  Other: Pt may benefit from AFO  PT Equipment Recommendations  Other: Pt may benefit from AFO     Past Medical History:  has a past medical history of Arthritis, Asthma, Bronchitis, CKD (chronic kidney disease) stage 3, GFR 30-59 ml/min ([facility]), Constipation, GERD (gastroesophageal reflux disease), Hyperlipidemia, Hypertension, Hypokalemia, Overactive bladder, Prolonged emergence from general anesthesia, Tachycardia, Wears dentures, and Wears glasses.  Past Surgical History:   has a past surgical history that includes back surgery ( and ); Bladder surgery (, ); Tubal ligation; Total hip arthroplasty (Bilateral, , ); cyst removal (2017); Colonoscopy (); Colonoscopy (N/A, 2018); Cataract removal with implant (Bilateral); pr arthroscopy knee diagnostic w/wo synovial bx spx (Right, 10/04/2018); Stimulator Surgery (N/A, 10/11/2021); Stimulator Surgery (N/A, 10/11/2021); Stimulator Surgery (10/25/2021); Stimulator Surgery (N/A, 10/25/2021); Knee arthroscopy (Right); Colonoscopy (N/A, 2021); lumbar fusion (N/A, 2024); and lumbar fusion (N/A, 2025).    Subjective  Subjective  Subjective: Pt with c/o back pain at rest and with activity. Reports increased feeling in LLE vs prior to surgery, and denies pain in LEs     General  Patient assessed for rehabilitation services?: Yes  Additional Pertinent Hx: Per H and P 25: Muna Lomax is 74 y.o.  female, here for preadmission testing related to spinal stenosis of lumbar region, unspecified whether neurogenic claudication  Recommendations: Strengthening, Balance training, Functional mobility training, Transfer training, Gait training, Stair training, Neuromuscular re-education, Patient/Caregiver education & training, Equipment evaluation, education, & procurement, Therapeutic activities   Safety Devices  Type of Devices: Call light within reach, Chair alarm in place, Gait belt, Patient at risk for falls, Left in chair, Nurse notified  Restraints  Restraints Initially in Place: No       PT Individual Minutes  Time In: 0959  Time Out: 1037  Minutes: 38   Time Code Minutes  Timed Code Treatment Minutes: 28 Minutes       Electronically signed by Li Miller PT on 6/10/25 at 1:26 PM EDT

## 2025-06-10 NOTE — PROGRESS NOTES
Surgical Progress Note    POD: 1    Patient doing well  Vitals:    06/10/25 0428   BP:    Pulse:    Resp: 17   Temp:    SpO2:       Temp (24hrs), Av °F (36.7 °C), Min:97.3 °F (36.3 °C), Max:98.6 °F (37 °C)       Pain Control good  No unusual nausea    Exam: reports mild increase in strength and decrease in N/T        Lungs:  No respiratory distress    Labs reviewed:  Labs:                I/O last 3 completed shifts:  In: 1350 [I.V.:1350]  Out: 2560 [Urine:1700; Drains:360; Blood:500]    Assessment:    Patient Active Problem List   Diagnosis    Asthma    Cervical disc disease    Esophageal reflux    Fatigue    Hip joint stiffness    Hyperlipidemia    Essential hypertension    Hypokalemia    Joint pain, hip    Lower back pain    Muscle spasm    Nasal congestion    Neck pain    Osteoarthritis of hip    Osteoarthritis of neck    Sacroiliac strain    Scoliosis    Shortness of breath    Snoring    Tachycardia    Urinary incontinence    Weight gain    Acute internal derangement of right knee    BMI 36.0-36.9,adult    Tubular adenoma of colon    Chronic vulvitis    Overactive bladder    Postmenopausal atrophic vaginitis    Chronic renal disease, stage III (HCC) [714534]    Severe obesity (BMI 35.0-39.9) with comorbidity (HCC)    Acquired spondylolisthesis    Spinal stenosis of lumbar region with neurogenic claudication    Spinal stenosis of lumbar region    Lumbar stenosis with neurogenic claudication    History of lumbar fusion       Plan:  See my orders  Discharge planning  Noam Vallejo MD MD  6/10/2025 7:43 AM

## 2025-06-10 NOTE — CONSULTS
[facility] Internal Medicine  Kenton Soto MD; Jair Saucedo MD, DrLor MD. Dr LILLIAN Fang MD.,    MD; Alan Armijo MD    [facility] Internal Medicine   IN-PATIENT SERVICE   [facility]    CONSULTATION / HISTORY AND PHYSICAL EXAMINATION            Date:   6/10/2025  Patient name:  Muna Lomax  Date of admission:  6/9/2025  5:43 AM  MRN:   [id number]  Account:  [id number]  YOB: 1950  PCP:    Abbey Plasencia MD  Room:   2057/2057-01  Code Status:    Full Code    Physician Requesting Consult: Noam Vallejo MD    Reason for Consult:  Medical Management    Chief Complaint:     No chief complaint on file.      History of Present Illness:   74-year-old female with underlying history of hypertension, hyperlipidemia, GERD, CKD admitted electively under orthopedic surgery for spinal stenosis lumbar region with neurogenic claudication, status post surgery we are consulted for medical management      Past Medical History:     Past Medical History:   Diagnosis Date    Arthritis     Asthma     \"winter\" asthma    Bronchitis     CKD (chronic kidney disease) stage 3, GFR 30-59 ml/min ([facility])     Constipation     GERD (gastroesophageal reflux disease)     Hyperlipidemia     Hypertension     Hypokalemia     Overactive bladder     Prolonged emergence from general anesthesia     after last back surgery in 7/2024, had to use narcan to assist in waking up    Tachycardia     \"racing heart\"    Wears dentures     has an upper plate 2 teeth on right and 2 teeth on left    Wears glasses         Past Surgical History:     Past Surgical History:   Procedure Laterality Date    BACK SURGERY  1983 and 1992    neck and lower back    BLADDER SURGERY  2006, 2008    2 surgeries: sling surgery first then another sling and tack bladder up surgery    CATARACT REMOVAL WITH IMPLANT Bilateral     COLONOSCOPY  2015    had polyp, done at [facility]. Dr Naranjo    COLONOSCOPY N/A 08/21/2018  2027   Abbey Plasencia MD   aspirin 81 MG EC tablet Take 1 tablet by mouth daily    Provider, MD Niels        Allergies:     Ciprofloxacin, Codeine, Lisinopril, Morphine, Norvasc [amlodipine besylate], Penicillins, Sanctura [trospium], Xarelto [rivaroxaban], and Macrobid [nitrofurantoin]    Social History:     Tobacco:    reports that she has never smoked. She has never used smokeless tobacco.  Alcohol:      reports current alcohol use.  Drug Use:  reports no history of drug use.    Family History:     Family History   Problem Relation Age of Onset    Hypertension Mother     Fainting Mother     Hypertension Sister     Heart Disease Maternal Grandmother        Review of Systems:     Positive and Negative as described in HPI.    CONSTITUTIONAL:  negative for fevers, chills, sweats, fatigue, weight loss  HEENT:  negative for vision, hearing changes, runny nose, throat pain  RESPIRATORY:  negative for shortness of breath, cough, congestion, wheezing.  CARDIOVASCULAR:  negative for chest pain, palpitations.  GASTROINTESTINAL:  negative for nausea, vomiting, diarrhea, constipation, change in bowel habits, abdominal pain   GENITOURINARY:  negative for difficulty of urination, burning with urination, frequency   INTEGUMENT:  negative for rash, skin lesions, easy bruising   HEMATOLOGIC/LYMPHATIC:  negative for swelling/edema   ALLERGIC/IMMUNOLOGIC:  negative for urticaria , itching  ENDOCRINE:  negative increase in drinking, increase in urination, hot or cold intolerance  MUSCULOSKELETAL:  negative joint pains, muscle aches, swelling of joints  NEUROLOGICAL:  negative for headaches, dizziness, lightheadedness, numbness, pain, tingling extremities  BEHAVIOR/PSYCH:  negative for depression, anxiety    Physical Exam:     /74   Pulse 95   Temp 98.5 °F (36.9 °C)   Resp 18   Ht 1.626 m (5' 4\")   Wt 92.5 kg (204 lb)   SpO2 96%   BMI 35.02 kg/m²   Temp (24hrs), Av °F (36.7 °C), Min:97.3 °F (36.3

## 2025-06-10 NOTE — PLAN OF CARE
Problem: Discharge Planning  Goal: Discharge to home or other facility with appropriate resources  Outcome: Progressing  Flowsheets (Taken 6/9/2025 2053)  Discharge to home or other facility with appropriate resources:   Identify barriers to discharge with patient and caregiver   Arrange for needed discharge resources and transportation as appropriate   Identify discharge learning needs (meds, wound care, etc)     Problem: Pain  Goal: Verbalizes/displays adequate comfort level or baseline comfort level  Outcome: Progressing  Flowsheets (Taken 6/9/2025 2043)  Verbalizes/displays adequate comfort level or baseline comfort level:   Encourage patient to monitor pain and request assistance   Assess pain using appropriate pain scale   Administer analgesics based on type and severity of pain and evaluate response   Implement non-pharmacological measures as appropriate and evaluate response     Problem: Skin/Tissue Integrity  Goal: Skin integrity remains intact  Description: 1.  Monitor for areas of redness and/or skin breakdown2.  Assess vascular access sites hourly3.  Every 4-6 hours minimum:  Change oxygen saturation probe site4.  Every 4-6 hours:  If on nasal continuous positive airway pressure, respiratory therapy assess nares and determine need for appliance change or resting period  Outcome: Progressing  Flowsheets (Taken 6/9/2025 2053)  Skin Integrity Remains Intact:   Monitor for areas of redness and/or skin breakdown   Assess vascular access sites hourly     Problem: ABCDS Injury Assessment  Goal: Absence of physical injury  Outcome: Progressing  Flowsheets (Taken 6/9/2025 2053)  Absence of Physical Injury: Implement safety measures based on patient assessment     Problem: Safety - Adult  Goal: Free from fall injury  Outcome: Progressing  Flowsheets (Taken 6/9/2025 2053)  Free From Fall Injury: Instruct family/caregiver on patient safety

## 2025-06-10 NOTE — ACP (ADVANCE CARE PLANNING)
Advance Care Planning     Advance Care Planning Activator (Inpatient)  Conversation Note      Date of ACP Conversation: 6/10/2025     Conversation Conducted with: Patient with Decision Making Capacity    ACP Activator: Greer Marte RN    Health Care Decision Maker:     Current Designated Health Care Decision Maker:     Primary Decision Maker: Chance Lomax - Spouse - [phone]    Today we documented Decision Maker(s) consistent with Legal Next of Kin hierarchy.    Care Preferences    Ventilation:  \"If you were in your present state of health and suddenly became very ill and were unable to breathe on your own, what would your preference be about the use of a ventilator (breathing machine) if it were available to you?\"      Would the patient desire the use of ventilator (breathing machine)?: yes    \"If your health worsens and it becomes clear that your chance of recovery is unlikely, what would your preference be about the use of a ventilator (breathing machine) if it were available to you?\"     Would the patient desire the use of ventilator (breathing machine)?: No      Resuscitation  \"CPR works best to restart the heart when there is a sudden event, like a heart attack, in someone who is otherwise healthy. Unfortunately, CPR does not typically restart the heart for people who have serious health conditions or who are very sick.\"    \"In the event your heart stopped as a result of an underlying serious health condition, would you want attempts to be made to restart your heart (answer \"yes\" for attempt to resuscitate) or would you prefer a natural death (answer \"no\" for do not attempt to resuscitate)?\" yes       [] Yes   [] No   Educated Patient / Decision Maker regarding differences between Advance Directives and portable DNR orders.    Length of ACP Conversation in minutes:      Conversation Outcomes:  ACP discussion completed    Follow-up plan:    [] Schedule follow-up conversation to continue planning  []

## 2025-06-10 NOTE — CARE COORDINATION
Case Management Assessment  Initial Evaluation    Date/Time of Evaluation: 6/10/2025 11:40 AM  Assessment Completed by: Greer Marte RN    If patient is discharged prior to next notation, then this note serves as note for discharge by case management.    Patient Name: Muna Lomax                   YOB: 1950  Diagnosis: Spinal stenosis of lumbar region, unspecified whether neurogenic claudication present [M48.061]  Lumbar stenosis with neurogenic claudication [M48.062]                   Date / Time: 6/9/2025  5:43 AM    Patient Admission Status: Inpatient   Readmission Risk (Low < 19, Mod (19-27), High > 27): Readmission Risk Score: 14.7    Current PCP: Abbey Plasencia MD  PCP verified by CM? Yes    Chart Reviewed: Yes      History Provided by: Patient  Patient Orientation: Alert and Oriented    Patient Cognition: Alert    Hospitalization in the last 30 days (Readmission):  No    If yes, Readmission Assessment in CM Navigator will be completed.    Advance Directives:      Code Status: Full Code   Patient's Primary Decision Maker is: Legal Next of Kin    Primary Decision Maker: Chance Lomax - Spouse - [phone]    Discharge Planning:    Patient lives with: Spouse/Significant Other Type of Home: House  Primary Care Giver: Self  Patient Support Systems include: Family Members, Children   Current Financial resources: Medicare  Current community resources: None  Current services prior to admission: Durable Medical Equipment            Current DME: (S) Walker, Cane, Bedside Commode, Other (Comment) (LIFT CHAIR)            Type of Home Care services:  None    ADLS  Prior functional level: Independent in ADLs/IADLs  Current functional level: Independent in ADLs/IADLs, Assistance with the following:, Cooking, Housework, Shopping    PT AM-PAC:   /24  OT AM-PAC:   /24    Family can provide assistance at DC: Yes  Would you like Case Management to discuss the discharge plan with any other family

## 2025-06-10 NOTE — DISCHARGE INSTR - COC
Continuity of Care Form    Patient Name: Muna Lomax   :  1950  MRN:  [id number]    Admit date:  2025  Discharge date:  2025    Code Status Order: Full Code   Advance Directives:    Date/Time Healthcare Directive Type of Healthcare Directive Copy in Chart Healthcare Agent Appointed Healthcare Agent's Name Healthcare Agent's Phone Number    [phone] Yes, patient has an advance directive for healthcare treatment  Durable power of  for health care  No, copy requested from family  --  Chance,   --             Admitting Physician:  Noam Vallejo MD  PCP: Abbey Plasencia MD    Discharging Nurse:   Discharging Hospital Unit/Room#: 7/-01  Discharging Unit Phone Number: [phone]    Emergency Contact:   Extended Emergency Contact Information  Primary Emergency Contact: Chance Lomax  Address: [address] [place] [id number] [facility]  Home Phone: [phone]  Work Phone: [phone]  Mobile Phone: [phone]  Relation: Spouse    Past Surgical History:  Past Surgical History:   Procedure Laterality Date    BACK SURGERY   and     neck and lower back    BLADDER SURGERY  , 2008    2 surgeries: sling surgery first then another sling and tack bladder up surgery    CATARACT REMOVAL WITH IMPLANT Bilateral     COLONOSCOPY      had polyp, done at [facility]. Dr Naranjo    COLONOSCOPY N/A 2018    COLONOSCOPY POLYPECTOMY /COLD BX  performed by Aldo Naranjo MD at [facility] OR    COLONOSCOPY N/A 2021    COLONOSCOPY POLYPECTOMY SNARE/COLD BIOPSY performed by Aldo Naranjo MD at [facility] ENDO    CYST REMOVAL  2017    upper rt back.    KNEE ARTHROSCOPY Right     LUMBAR FUSION N/A 2024    POSTERIOR DECOMPRESSION  INSTRUMENTED FUSION L4-S1 performed by Naom Vallejo MD at [facility] OR    LUMBAR FUSION N/A 2025    LUMBAR LAMINECTOMY FUSION POSTERIOR L2-4/REVISION POSTERIOR INSTRUMENTATION L4-SACRUM performed by Noam Vallejo  [phone]  Fax:  [phone]     / signature: Electronically signed by Greer Marte RN on 6/10/25 at 4:55 PM EDT    PHYSICIAN SECTION    Prognosis: Good    Condition at Discharge: Stable    Rehab Potential (if transferring to Rehab): Good    Recommended Labs or Other Treatments After Discharge: na    Physician Certification: I certify the above information and transfer of Muna Lomax  is necessary for the continuing treatment of the diagnosis listed and that she requires Home Care for less 30 days.     Update Admission H&P: No change in H&P    PHYSICIAN SIGNATURE:  Electronically signed by Noam Vallejo MD on 6/10/25 at 7:46 AM EDT

## 2025-06-10 NOTE — PLAN OF CARE
Problem: Discharge Planning  Goal: Discharge to home or other facility with appropriate resources  6/10/2025 1502 by Brunilda Arango RN  Outcome: Progressing  Flowsheets (Taken 6/10/2025 0908)  Discharge to home or other facility with appropriate resources:   Identify barriers to discharge with patient and caregiver   Arrange for needed discharge resources and transportation as appropriate   Identify discharge learning needs (meds, wound care, etc)   Refer to discharge planning if patient needs post-hospital services based on physician order or complex needs related to functional status, cognitive ability or social support system  6/10/2025 0300 by Leatha Jimenes RN  Outcome: Progressing  Flowsheets (Taken 6/9/2025 2053)  Discharge to home or other facility with appropriate resources:   Identify barriers to discharge with patient and caregiver   Arrange for needed discharge resources and transportation as appropriate   Identify discharge learning needs (meds, wound care, etc)     Problem: Pain  Goal: Verbalizes/displays adequate comfort level or baseline comfort level  6/10/2025 1502 by Brunilda Arango RN  Outcome: Progressing  6/10/2025 0300 by Leatha Jimenes RN  Outcome: Progressing  Flowsheets (Taken 6/9/2025 2043)  Verbalizes/displays adequate comfort level or baseline comfort level:   Encourage patient to monitor pain and request assistance   Assess pain using appropriate pain scale   Administer analgesics based on type and severity of pain and evaluate response   Implement non-pharmacological measures as appropriate and evaluate response     Problem: Skin/Tissue Integrity  Goal: Skin integrity remains intact  Description: 1.  Monitor for areas of redness and/or skin breakdown2.  Assess vascular access sites hourly3.  Every 4-6 hours minimum:  Change oxygen saturation probe site4.  Every 4-6 hours:  If on nasal continuous positive airway pressure, respiratory therapy assess nares and determine need for

## 2025-06-11 ENCOUNTER — TELEPHONE (OUTPATIENT)
Dept: PRIMARY CARE CLINIC | Age: 75
End: 2025-06-11

## 2025-06-11 PROCEDURE — 97530 THERAPEUTIC ACTIVITIES: CPT

## 2025-06-11 PROCEDURE — 97116 GAIT TRAINING THERAPY: CPT

## 2025-06-11 PROCEDURE — 99024 POSTOP FOLLOW-UP VISIT: CPT | Performed by: ORTHOPAEDIC SURGERY

## 2025-06-11 PROCEDURE — 1200000000 HC SEMI PRIVATE

## 2025-06-11 PROCEDURE — 99232 SBSQ HOSP IP/OBS MODERATE 35: CPT | Performed by: INTERNAL MEDICINE

## 2025-06-11 PROCEDURE — 6370000000 HC RX 637 (ALT 250 FOR IP): Performed by: ORTHOPAEDIC SURGERY

## 2025-06-11 RX ADMIN — ACETAMINOPHEN 650 MG: 325 TABLET ORAL at 14:02

## 2025-06-11 RX ADMIN — ACETAMINOPHEN 650 MG: 325 TABLET ORAL at 19:57

## 2025-06-11 RX ADMIN — CLONIDINE HYDROCHLORIDE 0.3 MG: 0.2 TABLET ORAL at 19:57

## 2025-06-11 RX ADMIN — CARVEDILOL 3.12 MG: 3.12 TABLET, FILM COATED ORAL at 08:47

## 2025-06-11 RX ADMIN — PANTOPRAZOLE SODIUM 40 MG: 40 TABLET, DELAYED RELEASE ORAL at 05:37

## 2025-06-11 RX ADMIN — SPIRONOLACTONE 50 MG: 25 TABLET ORAL at 08:47

## 2025-06-11 RX ADMIN — OXYCODONE HYDROCHLORIDE 10 MG: 10 TABLET ORAL at 17:54

## 2025-06-11 RX ADMIN — GABAPENTIN 300 MG: 300 CAPSULE ORAL at 14:02

## 2025-06-11 RX ADMIN — SENNOSIDES 8.6 MG: 8.6 TABLET, FILM COATED ORAL at 19:57

## 2025-06-11 RX ADMIN — GABAPENTIN 300 MG: 300 CAPSULE ORAL at 19:57

## 2025-06-11 RX ADMIN — OXYCODONE HYDROCHLORIDE 10 MG: 10 TABLET ORAL at 23:49

## 2025-06-11 RX ADMIN — GABAPENTIN 300 MG: 300 CAPSULE ORAL at 08:47

## 2025-06-11 RX ADMIN — MAGNESIUM HYDROXIDE 30 ML: 400 SUSPENSION ORAL at 08:55

## 2025-06-11 RX ADMIN — CARVEDILOL 3.12 MG: 3.12 TABLET, FILM COATED ORAL at 19:57

## 2025-06-11 RX ADMIN — ATORVASTATIN CALCIUM 20 MG: 20 TABLET, FILM COATED ORAL at 08:47

## 2025-06-11 RX ADMIN — OXYCODONE HYDROCHLORIDE 10 MG: 10 TABLET ORAL at 04:25

## 2025-06-11 RX ADMIN — OXYCODONE HYDROCHLORIDE AND ACETAMINOPHEN 500 MG: 500 TABLET ORAL at 08:47

## 2025-06-11 RX ADMIN — ACETAMINOPHEN 650 MG: 325 TABLET ORAL at 08:47

## 2025-06-11 RX ADMIN — OXYCODONE HYDROCHLORIDE 10 MG: 10 TABLET ORAL at 08:47

## 2025-06-11 ASSESSMENT — PAIN DESCRIPTION - DESCRIPTORS
DESCRIPTORS: TIGHTNESS;SQUEEZING
DESCRIPTORS: DULL;DISCOMFORT
DESCRIPTORS: BURNING

## 2025-06-11 ASSESSMENT — PAIN DESCRIPTION - ONSET
ONSET: AWAKENED FROM SLEEP
ONSET: ON-GOING

## 2025-06-11 ASSESSMENT — PAIN SCALES - GENERAL
PAINLEVEL_OUTOF10: 7
PAINLEVEL_OUTOF10: 6
PAINLEVEL_OUTOF10: 0
PAINLEVEL_OUTOF10: 3
PAINLEVEL_OUTOF10: 2
PAINLEVEL_OUTOF10: 7

## 2025-06-11 ASSESSMENT — PAIN - FUNCTIONAL ASSESSMENT
PAIN_FUNCTIONAL_ASSESSMENT: ACTIVITIES ARE NOT PREVENTED
PAIN_FUNCTIONAL_ASSESSMENT: ACTIVITIES ARE NOT PREVENTED

## 2025-06-11 ASSESSMENT — PAIN DESCRIPTION - LOCATION
LOCATION: BACK

## 2025-06-11 ASSESSMENT — PAIN DESCRIPTION - ORIENTATION
ORIENTATION: MID;LOWER
ORIENTATION: LOWER

## 2025-06-11 ASSESSMENT — PAIN DESCRIPTION - PAIN TYPE
TYPE: SURGICAL PAIN
TYPE: SURGICAL PAIN

## 2025-06-11 ASSESSMENT — PAIN DESCRIPTION - DIRECTION
RADIATING_TOWARDS: DENIES
RADIATING_TOWARDS: DENIES

## 2025-06-11 ASSESSMENT — PAIN DESCRIPTION - FREQUENCY
FREQUENCY: CONTINUOUS
FREQUENCY: CONTINUOUS

## 2025-06-11 NOTE — CONSULTS
[facility] Internal Medicine  Kenton Soto MD; Jair Saucedo MD, DrLor MD. Dr LILLIAN Fang MD.,    MD; Alan Armijo MD    [facility] Internal Medicine   IN-PATIENT SERVICE   [facility]    CONSULTATION / HISTORY AND PHYSICAL EXAMINATION            Date:   6/11/2025  Patient name:  Muna Lomax  Date of admission:  6/9/2025  5:43 AM  MRN:   [id number]  Account:  [id number]  YOB: 1950  PCP:    Abbey Plasencia MD  Room:   2057/2057-01  Code Status:    Full Code    Physician Requesting Consult: Noam Vallejo MD    Reason for Consult:  Medical Management    Chief Complaint:     No chief complaint on file.      History of Present Illness:   74-year-old female with underlying history of hypertension, hyperlipidemia, GERD, CKD admitted electively under orthopedic surgery for spinal stenosis lumbar region with neurogenic claudication, status post surgery we are consulted for medical management      Past Medical History:     Past Medical History:   Diagnosis Date    Arthritis     Asthma     \"winter\" asthma    Bronchitis     CKD (chronic kidney disease) stage 3, GFR 30-59 ml/min ([facility])     Constipation     GERD (gastroesophageal reflux disease)     Hyperlipidemia     Hypertension     Hypokalemia     Overactive bladder     Prolonged emergence from general anesthesia     after last back surgery in 7/2024, had to use narcan to assist in waking up    Tachycardia     \"racing heart\"    Wears dentures     has an upper plate 2 teeth on right and 2 teeth on left    Wears glasses         Past Surgical History:     Past Surgical History:   Procedure Laterality Date    BACK SURGERY  1983 and 1992    neck and lower back    BLADDER SURGERY  2006, 2008    2 surgeries: sling surgery first then another sling and tack bladder up surgery    CATARACT REMOVAL WITH IMPLANT Bilateral     COLONOSCOPY  2015    had polyp, done at [facility]. Dr Naranjo    COLONOSCOPY N/A 08/21/2018  continue as needed medications,  Labs, radiology, medications reviewed,  Seen and examined, complaining of back pain,  Working with physical therapy, pain control    Consultations:   IP CONSULT TO PRIMARY CARE PROVIDER      Alan Armijo MD  6/11/2025  11:05 AM    Copy sent to Abbey Bolaños MD    Please note that this chart was generated using voice recognition Dragon dictation software.  Although every effort was made to ensure the accuracy of this automated transcription, some errors in transcription may have occurred.

## 2025-06-11 NOTE — PROGRESS NOTES
Patient concerned about discharge today stating she has a lot of stairs at her house.  PT perfectserved to work with patient on stairs.

## 2025-06-11 NOTE — PROGRESS NOTES
Physical Therapy  [facility]   Physical Therapy Treatment  Date: 25  Patient Name: Muna Lomax       Room:   MRN: [id number]  Account: [id number]   : 1950  (74 y.o.) Gender: female     Discharge Recommendations:  Discharge Recommendations: Continue to assess pending progress, Patient would benefit from continued therapy after discharge     PT D/C Equipment  Other: Pt may benefit from AFO  PT Equipment Recommendations  Other: Pt may benefit from AFO    General  Patient assessed for rehabilitation services?: Yes  Additional Pertinent Hx: Per H and P 25: Muna Lomax is 74 y.o.  female, here for preadmission testing related to spinal stenosis of lumbar region, unspecified whether neurogenic claudication present with scheduled LUMBAR LAMINECTOMY FUSION POSTERIOR L2-4/REVISION POSTERIOR INSTRUMENTATION L4-SACRUM per Dr. Vallejo.      Below italics is a portion of office visit note by Dr. Vallejo dated 05/15/25: Reviewed   HPI:  This is a 74 y.o. female who presents to the clinic today for follow up evaluation of back and left leg, last saw Lisbet Wilder PA-C..     Acute onset of left radicular leg pain and weakness of left foot.     Patient reports the inability to perform left foot dorsiflexion.     Patient ambulates with a cane        UPDATE  Symptoms started several weeks ago.  Pt c/o discomfort to mid to left lower back with radiation into left buttocks and upper posterior thigh.  Describes pain as aching  Rating pain 3-4/10.  Takes tylenol with some relief.  Standing, walking and sitting for prolonged periods aggravates pain.  Icing, rest and elevating lower extremities helps alleviate symptoms.  Has numbness and tingling to left leg and foot.  Denies recent fall, injury or trauma.  Denies redness or rash to surgical site.   Denies hx of MRSA infection.  Response To Previous Treatment: Not applicable  Family/Caregiver Present: No  Referring Practitioner:   ex, completed 8\" step x14 reps with bilat UE support  Postural Correction Exercises: vcs for forward gaze  Motor Control/Coordination: cueing  for LE sequencing to complete stair negotiation  Disease-specific Exercises: patient has surgical precautions of no excessive bending, twisting, lifting     Assessment  Assessment  Assessment: Pt demonstrates mobility impairments related to deficits in pain, strength, balance and activity tolerance. Pt's prior level of function was independent in house without AD, Mod I in community using quad cane. Pt is now requiring min to CGA using RW. Pt could benefit from skilled therapy services to facilitate increased independence and safe d/c  Performance Deficits/Impairments: Decreased functional mobility , Decreased strength, Decreased endurance, Decreased balance, Increased pain  Treatment Diagnosis: Difficulty walking  Therapy Prognosis: Good  Decision Making: Medium Complexity  History: Spinal stenosis of lumbar region with neurogenic claudication, asthma, HTN,, CKD  Exam: strength, balance, transfers, bed mobility, ambulation  Clinical Presentation: Min assist rolling, min sup>sit, min sit>stand, CGA ambulation with RW for 20 feet  Discharge Recommendations: Continue to assess pending progress, Patient would benefit from continued therapy after discharge  Activity Tolerance  Activity Tolerance: Patient limited by pain, Patient limited by fatigue, Patient limited by endurance     Patient Education  Patient Education  Education Given To: Patient  Education Provided: Plan of Care, Transfer Training, Mobility Training, Precautions, Fall Prevention Strategies  Education Method: Demonstration, Verbal  Barriers to Learning: None  Education Outcome: Continued education needed, Verbalized understanding, Demonstrated understanding     Functional Outcome Measures  AM-PAC Basic Mobility - Inpatient   How much help is needed turning from your back to your side while in a flat bed without

## 2025-06-11 NOTE — PROGRESS NOTES
Surgical Progress Note    POD:      Patient doing fairly well  Vitals:    25 0844   BP: 134/72   Pulse: 70   Resp: 16   Temp: 98.5 °F (36.9 °C)   SpO2: 94%      Temp (24hrs), Av.7 °F (37.1 °C), Min:98.4 °F (36.9 °C), Max:99 °F (37.2 °C)       Pain Control fair  No unusual nausea    Exam: moderate drain output        Lungs:  No respiratory distress    Labs reviewed:  Labs:                I/O last 3 completed shifts:  In: -   Out: 3035 [Urine:2600; Drains:435]    Assessment:    Patient Active Problem List   Diagnosis    Asthma    Cervical disc disease    Esophageal reflux    Fatigue    Hip joint stiffness    Hyperlipidemia    Essential hypertension    Hypokalemia    Joint pain, hip    Lower back pain    Muscle spasm    Nasal congestion    Neck pain    Osteoarthritis of hip    Osteoarthritis of neck    Sacroiliac strain    Scoliosis    Shortness of breath    Snoring    Tachycardia    Urinary incontinence    Weight gain    Acute internal derangement of right knee    BMI 36.0-36.9,adult    Tubular adenoma of colon    Chronic vulvitis    Overactive bladder    Postmenopausal atrophic vaginitis    Chronic renal disease, stage III (HCC) [466716]    Severe obesity (BMI 35.0-39.9) with comorbidity (HCC)    Acquired spondylolisthesis    Spinal stenosis of lumbar region with neurogenic claudication    Spinal stenosis of lumbar region    Lumbar stenosis with neurogenic claudication    History of lumbar fusion       Plan:  See my orders  Likely dc drain and dc tomorrow    Noam Vallejo MD MD  2025 2:00 PM

## 2025-06-11 NOTE — PLAN OF CARE
Problem: Discharge Planning  Goal: Discharge to home or other facility with appropriate resources  6/11/2025 0306 by Leatha Jimenes RN  Outcome: Progressing  Flowsheets (Taken 6/10/2025 2030)  Discharge to home or other facility with appropriate resources:   Identify barriers to discharge with patient and caregiver   Arrange for needed discharge resources and transportation as appropriate   Identify discharge learning needs (meds, wound care, etc)     Problem: Pain  Goal: Verbalizes/displays adequate comfort level or baseline comfort level  6/11/2025 0306 by Leatha Jimenes, RN  Outcome: Progressing  Flowsheets (Taken 6/10/2025 1921)  Verbalizes/displays adequate comfort level or baseline comfort level:   Encourage patient to monitor pain and request assistance   Assess pain using appropriate pain scale   Administer analgesics based on type and severity of pain and evaluate response   Implement non-pharmacological measures as appropriate and evaluate response       Problem: Skin/Tissue Integrity  Goal: Skin integrity remains intact  Description: 1.  Monitor for areas of redness and/or skin breakdown2.  Assess vascular access sites hourly3.  Every 4-6 hours minimum:  Change oxygen saturation probe site4.  Every 4-6 hours:  If on nasal continuous positive airway pressure, respiratory therapy assess nares and determine need for appliance change or resting period  6/11/2025 0306 by Leatha Jimenes RN  Outcome: Progressing  Flowsheets (Taken 6/10/2025 2030)  Skin Integrity Remains Intact:   Monitor for areas of redness and/or skin breakdown   Assess vascular access sites hourly     Problem: ABCDS Injury Assessment  Goal: Absence of physical injury  6/11/2025 0306 by Leatha Jimenes RN  Outcome: Progressing  Flowsheets (Taken 6/10/2025 2030)  Absence of Physical Injury: Implement safety measures based on patient assessment     Problem: Safety - Adult  Goal: Free from fall injury  6/11/2025 0306 by Yudy

## 2025-06-11 NOTE — PROGRESS NOTES
Patients IV went back, writer messaged DUSTIN Del Real to see if it is okay to not have IV. NP okay with no IV at this time.

## 2025-06-11 NOTE — PROGRESS NOTES
Physical Therapy        Physical Therapy Cancel Note      DATE: 2025    NAME: Muna Lomax  MRN: [id number]   : 1950      Patient not seen this date for Physical Therapy due to:    Other: Cx; patient to discharge home. Will continue to follow for aptient needs/updates.      Electronically signed by Lakeshia Saenz PTA on 2025 at 9:42 AM

## 2025-06-11 NOTE — PLAN OF CARE
Problem: Pain  Goal: Verbalizes/displays adequate comfort level or baseline comfort level  6/11/2025 1413 by Beau Mcnamara, RN  Outcome: Progressing, Pt educated on non-pharmacological pain interventions. PRN pain medications administered when appropriate.        Problem: Safety - Adult  Goal: Free from fall injury  6/11/2025 1413 by Beau Mcnamara, RN  Outcome: Progressing, Patient remains free of incidence/ injury. Bed remains in low position. Side rails up x2.       Problem: Skin/Tissue Integrity  Goal: Skin integrity remains intact  Description: 1.  Monitor for areas of redness and/or skin breakdown2.  Assess vascular access sites hourly3.  Every 4-6 hours minimum:  Change oxygen saturation probe site4.  Every 4-6 hours:  If on nasal continuous positive airway pressure, respiratory therapy assess nares and determine need for appliance change or resting period  6/11/2025 1413 by Beau Mcnamara, RN  Outcome: Progressing, Pt remain free of skin breakdown. Educated on the importance of turning and alternating position.

## 2025-06-11 NOTE — DISCHARGE SUMMARY
Discharge Summary    Attending Physician: Noam Vallejo MD  Admit Date: 6/9/2025  Discharge Date:  6/12/2025  Primary Care Physician: Abbey Plasencia MD    Admitting Diagnosis:  Principal Problem:    Spinal stenosis of lumbar region  Active Problems:    Lumbar stenosis with neurogenic claudication    History of lumbar fusion  Resolved Problems:    * No resolved hospital problems. *        Discharge Diagnoses:  Principal Problem:    Spinal stenosis of lumbar region  Active Problems:    Lumbar stenosis with neurogenic claudication    History of lumbar fusion  Resolved Problems:    * No resolved hospital problems. *         Past Medical History:   Diagnosis Date    Arthritis     Asthma     \"winter\" asthma    Bronchitis     CKD (chronic kidney disease) stage 3, GFR 30-59 ml/min ([facility])     Constipation     GERD (gastroesophageal reflux disease)     Hyperlipidemia     Hypertension     Hypokalemia     Overactive bladder     Prolonged emergence from general anesthesia     after last back surgery in 7/2024, had to use narcan to assist in waking up    Tachycardia     \"racing heart\"    Wears dentures     has an upper plate 2 teeth on right and 2 teeth on left    Wears glasses        Procedures Performed and Findings  Procedure(s) with comments:  LUMBAR LAMINECTOMY FUSION POSTERIOR L2-4/REVISION POSTERIOR INSTRUMENTATION L4-SACRUM - Globus, K2M, Evokes     Consultations Obtained  IP CONSULT TO PRIMARY CARE PROVIDER    Hospital Course  uncomplicated    Discharge Medications       Medication List        START taking these medications      oxyCODONE-acetaminophen 5-325 MG per tablet  Commonly known as: Percocet  Take 1 tablet by mouth every 6 hours as needed for Pain for up to 7 days. Intended supply: 7 days. Take lowest dose possible to manage pain Max Daily Amount: 4 tablets            CONTINUE taking these medications      aspirin 81 MG EC tablet     CALCIUM CITRATE + D PO     carvedilol 3.125 MG tablet  Commonly known as:  6/11/2025 at 2:01 PM

## 2025-06-11 NOTE — TELEPHONE ENCOUNTER
Conchita from [address] called in needing to verify that Dr. Plasencia will follow for home care. Verbal of yes given as patient has been seen within 6 months. Last seen on 12/19/24 with Dr. Plasencia. Did advise patient will need to switch to a new provider as she is retiring this month.

## 2025-06-11 NOTE — CARE COORDINATION
ONGOING DISCHARGE PLAN:    Patient is alert and oriented x4.    Spoke with patient regarding discharge plan and patient confirms that plan is still home w/ VNS. [id number].    POD #1 L2-4 PLIF w/ revision ROBER x1    PT    F/U appt on 6/24 @ 9am    IMM letter provided to patient.  Patient offered four hours to make informed decision regarding appeal process; patient agreeable to discharge.      Will continue to follow for additional discharge needs.    If patient is discharged prior to next notation, then this note serves as note for discharge by case management.    Electronically signed by Greer Marte RN on 6/11/2025 at 8:41 AM

## 2025-06-12 VITALS
OXYGEN SATURATION: 92 % | HEART RATE: 81 BPM | HEIGHT: 64 IN | WEIGHT: 204 LBS | DIASTOLIC BLOOD PRESSURE: 81 MMHG | BODY MASS INDEX: 34.83 KG/M2 | SYSTOLIC BLOOD PRESSURE: 121 MMHG | TEMPERATURE: 97.8 F | RESPIRATION RATE: 18 BRPM

## 2025-06-12 PROCEDURE — 97116 GAIT TRAINING THERAPY: CPT

## 2025-06-12 PROCEDURE — 99232 SBSQ HOSP IP/OBS MODERATE 35: CPT | Performed by: INTERNAL MEDICINE

## 2025-06-12 PROCEDURE — 6370000000 HC RX 637 (ALT 250 FOR IP): Performed by: ORTHOPAEDIC SURGERY

## 2025-06-12 PROCEDURE — 97530 THERAPEUTIC ACTIVITIES: CPT

## 2025-06-12 RX ADMIN — OXYCODONE HYDROCHLORIDE 5 MG: 5 TABLET ORAL at 04:04

## 2025-06-12 RX ADMIN — SPIRONOLACTONE 50 MG: 25 TABLET ORAL at 08:35

## 2025-06-12 RX ADMIN — ATORVASTATIN CALCIUM 20 MG: 20 TABLET, FILM COATED ORAL at 08:35

## 2025-06-12 RX ADMIN — GABAPENTIN 300 MG: 300 CAPSULE ORAL at 13:54

## 2025-06-12 RX ADMIN — OXYCODONE HYDROCHLORIDE 10 MG: 10 TABLET ORAL at 13:47

## 2025-06-12 RX ADMIN — ACETAMINOPHEN 650 MG: 325 TABLET ORAL at 08:35

## 2025-06-12 RX ADMIN — ACETAMINOPHEN 650 MG: 325 TABLET ORAL at 04:02

## 2025-06-12 RX ADMIN — MAGNESIUM HYDROXIDE 30 ML: 400 SUSPENSION ORAL at 13:47

## 2025-06-12 RX ADMIN — PANTOPRAZOLE SODIUM 40 MG: 40 TABLET, DELAYED RELEASE ORAL at 08:35

## 2025-06-12 RX ADMIN — PANTOPRAZOLE SODIUM 40 MG: 40 TABLET, DELAYED RELEASE ORAL at 05:00

## 2025-06-12 RX ADMIN — CARVEDILOL 3.12 MG: 3.12 TABLET, FILM COATED ORAL at 08:35

## 2025-06-12 RX ADMIN — GABAPENTIN 300 MG: 300 CAPSULE ORAL at 08:35

## 2025-06-12 RX ADMIN — OXYCODONE HYDROCHLORIDE AND ACETAMINOPHEN 500 MG: 500 TABLET ORAL at 08:35

## 2025-06-12 RX ADMIN — ACETAMINOPHEN 650 MG: 325 TABLET ORAL at 13:47

## 2025-06-12 RX ADMIN — OXYCODONE HYDROCHLORIDE 10 MG: 10 TABLET ORAL at 08:35

## 2025-06-12 ASSESSMENT — PAIN - FUNCTIONAL ASSESSMENT
PAIN_FUNCTIONAL_ASSESSMENT: ACTIVITIES ARE NOT PREVENTED
PAIN_FUNCTIONAL_ASSESSMENT: ACTIVITIES ARE NOT PREVENTED
PAIN_FUNCTIONAL_ASSESSMENT: PREVENTS OR INTERFERES SOME ACTIVE ACTIVITIES AND ADLS
PAIN_FUNCTIONAL_ASSESSMENT: PREVENTS OR INTERFERES SOME ACTIVE ACTIVITIES AND ADLS

## 2025-06-12 ASSESSMENT — PAIN DESCRIPTION - LOCATION
LOCATION: BACK

## 2025-06-12 ASSESSMENT — PAIN SCALES - GENERAL
PAINLEVEL_OUTOF10: 2
PAINLEVEL_OUTOF10: 3
PAINLEVEL_OUTOF10: 8
PAINLEVEL_OUTOF10: 6
PAINLEVEL_OUTOF10: 6
PAINLEVEL_OUTOF10: 2
PAINLEVEL_OUTOF10: 7
PAINLEVEL_OUTOF10: 2

## 2025-06-12 ASSESSMENT — PAIN DESCRIPTION - DESCRIPTORS
DESCRIPTORS: ACHING;DISCOMFORT;SHARP
DESCRIPTORS: ACHING;DISCOMFORT;SHARP
DESCRIPTORS: ACHING
DESCRIPTORS: ACHING

## 2025-06-12 ASSESSMENT — PAIN DESCRIPTION - PAIN TYPE
TYPE: SURGICAL PAIN
TYPE: SURGICAL PAIN

## 2025-06-12 ASSESSMENT — PAIN DESCRIPTION - ONSET
ONSET: AWAKENED FROM SLEEP
ONSET: AWAKENED FROM SLEEP

## 2025-06-12 ASSESSMENT — PAIN DESCRIPTION - FREQUENCY
FREQUENCY: CONTINUOUS
FREQUENCY: CONTINUOUS

## 2025-06-12 ASSESSMENT — PAIN DESCRIPTION - ORIENTATION
ORIENTATION: LOWER

## 2025-06-12 ASSESSMENT — PAIN DESCRIPTION - DIRECTION
RADIATING_TOWARDS: DENIES
RADIATING_TOWARDS: DENIES

## 2025-06-12 NOTE — PLAN OF CARE
Problem: Discharge Planning  Goal: Discharge to home or other facility with appropriate resources  Outcome: Adequate for Discharge     Problem: Pain  Goal: Verbalizes/displays adequate comfort level or baseline comfort level  Outcome: Adequate for Discharge     Problem: Skin/Tissue Integrity  Goal: Skin integrity remains intact  Description: 1.  Monitor for areas of redness and/or skin breakdown2.  Assess vascular access sites hourly3.  Every 4-6 hours minimum:  Change oxygen saturation probe site4.  Every 4-6 hours:  If on nasal continuous positive airway pressure, respiratory therapy assess nares and determine need for appliance change or resting period  Outcome: Adequate for Discharge     Problem: ABCDS Injury Assessment  Goal: Absence of physical injury  Outcome: Adequate for Discharge     Problem: Safety - Adult  Goal: Free from fall injury  Outcome: Adequate for Discharge

## 2025-06-12 NOTE — CONSULTS
[facility] Internal Medicine  Kenton Soto MD; Jair Saucedo MD, DrLor MD. Dr LILLIAN Fang MD.,    MD; Alan Armijo MD    [facility] Internal Medicine   IN-PATIENT SERVICE   [facility]    CONSULTATION / HISTORY AND PHYSICAL EXAMINATION            Date:   6/12/2025  Patient name:  Muna Lomax  Date of admission:  6/9/2025  5:43 AM  MRN:   [id number]  Account:  [id number]  YOB: 1950  PCP:    Abbey Plasencia MD  Room:   2057/2057-01  Code Status:    Full Code    Physician Requesting Consult: Noam Vallejo MD    Reason for Consult:  Medical Management    Chief Complaint:     No chief complaint on file.      History of Present Illness:   74-year-old female with underlying history of hypertension, hyperlipidemia, GERD, CKD admitted electively under orthopedic surgery for spinal stenosis lumbar region with neurogenic claudication, status post surgery we are consulted for medical management      Past Medical History:     Past Medical History:   Diagnosis Date    Arthritis     Asthma     \"winter\" asthma    Bronchitis     CKD (chronic kidney disease) stage 3, GFR 30-59 ml/min ([facility])     Constipation     GERD (gastroesophageal reflux disease)     Hyperlipidemia     Hypertension     Hypokalemia     Overactive bladder     Prolonged emergence from general anesthesia     after last back surgery in 7/2024, had to use narcan to assist in waking up    Tachycardia     \"racing heart\"    Wears dentures     has an upper plate 2 teeth on right and 2 teeth on left    Wears glasses         Past Surgical History:     Past Surgical History:   Procedure Laterality Date    BACK SURGERY  1983 and 1992    neck and lower back    BLADDER SURGERY  2006, 2008    2 surgeries: sling surgery first then another sling and tack bladder up surgery    CATARACT REMOVAL WITH IMPLANT Bilateral     COLONOSCOPY  2015    had polyp, done at [facility]. Dr Naranjo    COLONOSCOPY N/A 08/21/2018  °F (36.4 °C), Max:98.2 °F (36.8 °C)    No results for input(s): \"POCGLU\" in the last 72 hours.    Intake/Output Summary (Last 24 hours) at 6/12/2025 1208  Last data filed at 6/12/2025 0738  Gross per 24 hour   Intake --   Output 1140 ml   Net -1140 ml       General Appearance:  alert, well appearing, and in no acute distress  Mental status: oriented to person, place, and time with normal affect  Head:  normocephalic, atraumatic.  Eye: no icterus, redness, pupils equal and reactive, extraocular eye movements intact, conjunctiva clear  Ear: normal external ear, no discharge, hearing intact  Nose:  no drainage noted  Mouth: mucous membranes moist  Neck: supple, no carotid bruits, thyroid not palpable  Lungs: Bilateral equal air entry, clear to ausculation, no wheezing, rales or rhonchi, normal effort  Cardiovascular: normal rate, regular rhythm, no murmur, gallop, rub.  Abdomen: Soft, nontender, nondistended, normal bowel sounds, no hepatomegaly or splenomegaly  Neurologic: There are no new focal motor or sensory deficits, normal muscle tone and bulk, no abnormal sensation, normal speech, cranial nerves II through XII grossly intact  Skin: No gross lesions, rashes, bruising or bleeding on exposed skin area  Extremities:  peripheral pulses palpable, no pedal edema or calf pain with palpation  Psych: normal affect    Investigations:      Laboratory Testing:  No results found for this or any previous visit (from the past 24 hours).    Imaging/Diagonstics:  No results found.    Assessment :      Hospital Problems           Last Modified POA    * (Principal) Spinal stenosis of lumbar region 5/16/2025 Unknown    Lumbar stenosis with neurogenic claudication 6/9/2025 Yes    History of lumbar fusion 6/9/2025 Yes       Plan:     74 female admitted with spinal stenosis, status postsurgery, we are consulted for medical management,  Hypertension, continue home medication,  Hyperlipidemia, continue statins,  Chronic constipation,  continue as needed medications,  Labs, radiology, medications reviewed,  Seen and examined, complaining of back pain,  Working with physical therapy, pain control    Consultations:   IP CONSULT TO PRIMARY CARE PROVIDER      Alan Armijo MD  6/12/2025  12:08 PM    Copy sent to Abbey Bolaños MD    Please note that this chart was generated using voice recognition Dragon dictation software.  Although every effort was made to ensure the accuracy of this automated transcription, some errors in transcription may have occurred.

## 2025-06-12 NOTE — PROGRESS NOTES
Pt provided discharge instructions. Pt teaches back all medication doses, times, indications and possible side effects. Pt also understands need to attend PCP follow up and make new provider follow up appointments.

## 2025-06-12 NOTE — PLAN OF CARE
Problem: Discharge Planning  Goal: Discharge to home or other facility with appropriate resources  6/12/2025 0010 by Stewart Redd, RN  Outcome: Progressing     Problem: Pain  Goal: Verbalizes/displays adequate comfort level or baseline comfort level  6/12/2025 0010 by Stweart Redd, RN  Outcome: Progressing     Problem: Skin/Tissue Integrity  Goal: Skin integrity remains intact  Description: 1.  Monitor for areas of redness and/or skin breakdown2.  Assess vascular access sites hourly3.  Every 4-6 hours minimum:  Change oxygen saturation probe site4.  Every 4-6 hours:  If on nasal continuous positive airway pressure, respiratory therapy assess nares and determine need for appliance change or resting period  6/12/2025 0010 by Stewart Redd, RN  Outcome: Progressing     Problem: ABCDS Injury Assessment  Goal: Absence of physical injury  6/12/2025 0010 by Stewart Redd, RN  Outcome: Progressing  Flowsheets (Taken 6/12/2025 0010)  Absence of Physical Injury: Implement safety measures based on patient assessment     Problem: Safety - Adult  Goal: Free from fall injury  6/12/2025 0010 by Stewart Redd, RN  Outcome: Progressing

## 2025-06-12 NOTE — PROGRESS NOTES
Surgical Progress Note    POD:      Patient doing well  Vitals:    25 0905   BP:    Pulse:    Resp: 15   Temp:    SpO2:       Temp (24hrs), Av.9 °F (36.6 °C), Min:97.5 °F (36.4 °C), Max:98.2 °F (36.8 °C)       Pain Control good  No unusual nausea    Exam: wound good drain dc'd        Lungs:  No respiratory distress    Labs reviewed:  Labs:                I/O last 3 completed shifts:  In: -   Out: 1765 [Urine:1450; Drains:315]    Assessment:    Patient Active Problem List   Diagnosis    Asthma    Cervical disc disease    Esophageal reflux    Fatigue    Hip joint stiffness    Hyperlipidemia    Essential hypertension    Hypokalemia    Joint pain, hip    Lower back pain    Muscle spasm    Nasal congestion    Neck pain    Osteoarthritis of hip    Osteoarthritis of neck    Sacroiliac strain    Scoliosis    Shortness of breath    Snoring    Tachycardia    Urinary incontinence    Weight gain    Acute internal derangement of right knee    BMI 36.0-36.9,adult    Tubular adenoma of colon    Chronic vulvitis    Overactive bladder    Postmenopausal atrophic vaginitis    Chronic renal disease, stage III (HCC) [329035]    Severe obesity (BMI 35.0-39.9) with comorbidity (HCC)    Acquired spondylolisthesis    Spinal stenosis of lumbar region with neurogenic claudication    Spinal stenosis of lumbar region    Lumbar stenosis with neurogenic claudication    History of lumbar fusion       Plan:  See my orders    Noam Vallejo MD MD  2025 12:24 PM

## 2025-06-12 NOTE — PROGRESS NOTES
Objective  Orientation  Overall Orientation Status: Within Functional Limits  Cognition  Overall Cognitive Status: WFL    Transfers  Transfers  Sit to Stand: Supervision  Stand to Sit: Supervision  Bed to Chair: Supervision (with RW)  Stand Pivot Transfers: Supervision (with RW)  Comment: no cueing needed this date, supervision for safety due to pain transitioning from sit<>stand     Mobility  Ambulation  Surface: Level tile  Device: Rolling Walker  Assistance: Supervision  Quality of Gait: antalgic gait pattern, left foot drop  Gait Deviations: Slow Merary, Decreased step length, Decreased step height, Decreased head and trunk rotation  Distance: 23' x2 reps  Comments: per patient reports mobility getting easier with prolonged distance  More Ambulation?: No     Stairs  Stairs/Curb  Stairs?: Yes  # Steps : 16  Stairs Height: 8\"  Rails: Left ascending (bilat support on left ascending rail)  Assistance: Contact guard assistance  Comment: ascending with right LE & descending with left LE laterally    Bed Mobility  Bed mobility  Sit to Supine: Contact guard assistance  Scooting: Stand by assistance  Bed Mobility Comments: patient approached/departed in bedside chair     Balance  Balance  Sitting - Static: Good  Sitting - Dynamic: Good  Standing - Static: Fair  Standing - Dynamic: Fair, -     PT Exercises  Exercise Treatment: functional mobility; see above  Circulation/Endurance Exercises: stair negotiation  Pressure Relief Exercises: self adjsuts  Functional Mobility Circuit Training: transfers from variable surface  Static Sitting Balance Exercises: sat trunk unsupported, feet on floor ~2 min  Dynamic Sitting Balance Exercises: scooting, weight shifting, postural ex with trunk supported to reposition for comfort  Static Standing Balance Exercises: standing with bilat UE support ~10 min  Dynamic Standing Balance Exercises: pivots, weight shifting, postural ex, stabilization ex, completed 8\" step x16 reps with bilat  needed moving from lying on your back to sitting on the side of a flat bed without using bedrails?: A Little  How much help is needed moving to and from a bed to a chair?: A Little  How much help is needed standing up from a chair using your arms?: A Little  How much help is needed walking in hospital room?: A Little  How much help is needed climbing 3-5 steps with a railing?: A Little  AM-[facility] Inpatient Mobility Raw Score : 18  AM-[facility] Inpatient T-Scale Score : 43.63  Mobility Inpatient CMS 0-100% Score: 46.58  Mobility Inpatient CMS G-Code Modifier : CK     Goals  Patient Goals   Patient Goals : To go home  Short Term Goals  Time Frame for Short Term Goals: 3-4 visits  Short Term Goal 1: Pt to complete rolling in bed with log roll technique SBA with cues  Short Term Goal 2: Pt to complete sup<>sit with SBA, rail and cues  Short Term Goal 3: Pt to complete sit to stand transfers with SBA and cues for technique  Short Term Goal 4: Pt to ambulate with RW for 40 feet with SBA  Short Term Goal 5: Pt to negotiate 5 steps with left hand rail CGA    Plan  Physical Therapy Plan  General Plan: 5-7 times per week  Current Treatment Recommendations: Strengthening, Balance training, Functional mobility training, Transfer training, Gait training, Stair training, Neuromuscular re-education, Patient/Caregiver education & training, Equipment evaluation, education, & procurement, Therapeutic activities   Safety Devices  Type of Devices: Call light within reach, Gait belt, Patient at risk for falls, Nurse notified, Left in chair (DEBBIE Khalil aware)  Restraints  Restraints Initially in Place: No     PT Individual Minutes  Time In: 0827  Time Out: 0858  Minutes: 31           Electronically signed by Lakeshia Saenz PTA on 6/12/25 at 10:10 AM EDT

## 2025-06-12 NOTE — CARE COORDINATION
Case Management   Daily Progress Note       Patient Name: Muna Lomax                   YOB: 1950  Diagnosis: Spinal stenosis of lumbar region, unspecified whether neurogenic claudication present [M48.061]  Lumbar stenosis with neurogenic claudication [M48.062]                       GMLOS: 2.6 days  Length of Stay: 3  days    Readmission Risk (Low < 19, Mod (19-27), High > 27): Readmission Risk Score: 15.1      Patient is alert and oriented.    Spoke with patient, and Current Transitional Plan is:    [x] Home Independently    [] Home with HC    [] Skilled Nursing Facility    [] Acute Rehabilitation    [] Long Term Acute Care (LTAC)    [] Other:     Medical Management: POD #3 L2-4 PLIF w/ revision.     Additional Notes: n/a    Electronically signed by Lucero Veras RN on 6/12/2025 at 1:01 PM

## 2025-06-13 ENCOUNTER — CARE COORDINATION (OUTPATIENT)
Dept: CARE COORDINATION | Age: 75
End: 2025-06-13

## 2025-06-13 DIAGNOSIS — M48.062 SPINAL STENOSIS OF LUMBAR REGION WITH NEUROGENIC CLAUDICATION: Primary | ICD-10-CM

## 2025-06-13 PROCEDURE — 1111F DSCHRG MED/CURRENT MED MERGE: CPT | Performed by: FAMILY MEDICINE

## 2025-06-13 NOTE — CARE COORDINATION
Care Transitions Note    Initial Call - Call within 2 business days of discharge: Yes    Patient Current Location:  Home: [address] [place] [address]    Care Transition Nurse contacted the patient by telephone to perform post hospital discharge assessment, verified name and  as identifiers.  Provided introduction to self, and explanation of the Care Transition Nurse role.    Patient: Muna Lomax    Patient : 1950   MRN: [id number]    Reason for Admission: Spinal stenosis, s/p lumbar laminectomy fusion  Discharge Date: 25  RURS: Readmission Risk Score: 15.1      Last Discharge Facility       Date Complaint Diagnosis Description Type Department Provider    25  History of lumbar fusion ... Admission (Discharged) Noam Varghese MD            Was this an external facility discharge? No    Additional needs identified to be addressed with provider   No needs identified             Method of communication with provider: none.    Patients top risk factors for readmission: medical condition-Lumbar stenosis with neurogenic claudication    Interventions to address risk factors:   Education: Medications as ordered, monitor for new numbness/tingling or worsening, monitor for s/s of infection, follow up with PCP and surgeon.     Care Summary Note: Spoke with patient today for initial 24 hour follow up call. Patient came in for elective laminectomy with Dr. Vallejo. Procedure went well, her drain was removed yesterday before discharge and she discharged to home with spouse and [facility] to follow.  Today, she said she slept well last night, got up once to use the restroom. She was painful waking up this morning, did take a pain pill which helped.  She can get up with a walker, bedroom located on 2nd floor of home where she plans on staying up there today.  [facility] had told her they will be reaching out to her later in the day to set something up for SOC.  She denies any f/c, n/v, states

## 2025-06-16 ASSESSMENT — PATIENT HEALTH QUESTIONNAIRE - PHQ9
2. FEELING DOWN, DEPRESSED OR HOPELESS: NOT AT ALL
SUM OF ALL RESPONSES TO PHQ QUESTIONS 1-9: 0
SUM OF ALL RESPONSES TO PHQ9 QUESTIONS 1 & 2: 0
SUM OF ALL RESPONSES TO PHQ QUESTIONS 1-9: 0
1. LITTLE INTEREST OR PLEASURE IN DOING THINGS: NOT AT ALL
SUM OF ALL RESPONSES TO PHQ QUESTIONS 1-9: 0
2. FEELING DOWN, DEPRESSED OR HOPELESS: NOT AT ALL
SUM OF ALL RESPONSES TO PHQ QUESTIONS 1-9: 0
1. LITTLE INTEREST OR PLEASURE IN DOING THINGS: NOT AT ALL

## 2025-06-19 ENCOUNTER — CARE COORDINATION (OUTPATIENT)
Dept: CARE COORDINATION | Age: 75
End: 2025-06-19

## 2025-06-19 ENCOUNTER — OFFICE VISIT (OUTPATIENT)
Dept: PRIMARY CARE CLINIC | Age: 75
End: 2025-06-19

## 2025-06-19 VITALS
SYSTOLIC BLOOD PRESSURE: 110 MMHG | HEIGHT: 64 IN | DIASTOLIC BLOOD PRESSURE: 80 MMHG | OXYGEN SATURATION: 96 % | HEART RATE: 73 BPM | WEIGHT: 199 LBS | BODY MASS INDEX: 33.97 KG/M2

## 2025-06-19 DIAGNOSIS — Z12.31 BREAST CANCER SCREENING BY MAMMOGRAM: ICD-10-CM

## 2025-06-19 DIAGNOSIS — N18.31 STAGE 3A CHRONIC KIDNEY DISEASE (HCC): ICD-10-CM

## 2025-06-19 DIAGNOSIS — K21.9 GASTROESOPHAGEAL REFLUX DISEASE, UNSPECIFIED WHETHER ESOPHAGITIS PRESENT: ICD-10-CM

## 2025-06-19 DIAGNOSIS — I10 ESSENTIAL HYPERTENSION: Primary | ICD-10-CM

## 2025-06-19 DIAGNOSIS — E78.2 MIXED HYPERLIPIDEMIA: ICD-10-CM

## 2025-06-19 RX ORDER — PANTOPRAZOLE SODIUM 40 MG/1
40 TABLET, DELAYED RELEASE ORAL
Qty: 90 TABLET | Refills: 1 | Status: SHIPPED | OUTPATIENT
Start: 2025-06-19

## 2025-06-19 NOTE — PROGRESS NOTES
[facility]  [facility]  [address]  [place] [address]  Dept: [phone]    Muna Lomax is a 74 y.o. female Established patient, who presents today for her medical conditions/complaintsas noted below.      Chief Complaint   Patient presents with    Hypertension     Pt is here for a 6 Month f/u.        HPI:     HPI    Reviewed prior notes Orthopedics  Reviewed previous Labs and Imaging  Had spinal surgery 6/9/25, walking with walker  Left leg not moving well, but that was also before surgery.   Left leg numbness is getting better since surgery.   Left foot drop, and left foot weakness.  Starting home PT soon.      No components found for: \"LDLCHOLESTEROL\", \"LDLCALC\"    (goal LDL is <100)   AST (U/L)   Date Value   02/29/2024 17     ALT (U/L)   Date Value   02/29/2024 14     BUN (mg/dL)   Date Value   05/29/2025 15     TSH (uIU/mL)   Date Value   08/04/2023 3.16     BP Readings from Last 3 Encounters:   06/19/25 110/80   06/12/25 121/81   05/29/25 (!) 146/97          (goal 120/80)    Past Medical History:   Diagnosis Date    Arthritis     Asthma     \"winter\" asthma    Bronchitis     CKD (chronic kidney disease) stage 3, GFR 30-59 ml/min ([facility])     Constipation     GERD (gastroesophageal reflux disease)     Hyperlipidemia     Hypertension     Hypokalemia     Overactive bladder     Prolonged emergence from general anesthesia     after last back surgery in 7/2024, had to use narcan to assist in waking up    Tachycardia     \"racing heart\"    Wears dentures     has an upper plate 2 teeth on right and 2 teeth on left    Wears glasses       Past Surgical History:   Procedure Laterality Date    BACK SURGERY  1983 and 1992    neck and lower back    BLADDER SURGERY  2006, 2008    2 surgeries: sling surgery first then another sling and tack bladder up surgery    CATARACT REMOVAL WITH IMPLANT Bilateral     COLONOSCOPY  2015    had polyp, done at [facility]. Dr Naranjo

## 2025-06-19 NOTE — CARE COORDINATION
Care Transitions Note    Follow Up Call     Patient Current Location:  Home: [address] [place] [address]    Care Transition Nurse contacted the patient by telephone. Verified name and  as identifiers.    Additional needs identified to be addressed with provider   No needs identified                 Method of communication with provider: none.    Care Summary Note: Spoke with patient today for transitional follow up call. Patient came in for elective laminectomy with Dr. Vallejo, surgery went well and she discharged to home with home care.  She is doing very well this week, was in to see her PCP today for follow up, notes reviewed.  She has had some improvement to the numbness and tingling to left foot, is able to move toes now some but still unable to flex feet/ankle and has foot drop to left.  Pain has been well controlled, states the last time she took narcotic medication was on , has been managing well and has good pain control.  Bowels finally moved but had to have spouse give her enema. Ever since she had enema has not had any further issues with her bowels.  Dressing on back is intact, states it is starting to itch around dressing, no redness or rash present at this time.  She has follow up with surgeon next week.  She denies any new needs or concerns at this time.     Plan of care updates since last contact:  None       Advance Care Planning:   Does patient have an Advance Directive: reviewed during previous call, see note. .    Medication Review:  Full medication review completed during previous call.    Remote Patient Monitoring:  Offered patient enrollment in the Remote Patient Monitoring (RPM) program for in-home monitoring: Patient is not eligible for RPM program because: patient does not have qualifying diagnosis.    Assessments:  Care Transitions Subsequent and Final Call    Schedule Follow Up Appointment with PCP: Completed  Subsequent and Final Calls  Do you have any ongoing symptoms?:

## 2025-06-24 ENCOUNTER — OFFICE VISIT (OUTPATIENT)
Dept: ORTHOPEDIC SURGERY | Age: 75
End: 2025-06-24

## 2025-06-24 VITALS — WEIGHT: 199 LBS | BODY MASS INDEX: 33.97 KG/M2 | HEIGHT: 64 IN | RESPIRATION RATE: 14 BRPM

## 2025-06-24 DIAGNOSIS — M21.372 LEFT FOOT DROP: ICD-10-CM

## 2025-06-24 DIAGNOSIS — Z98.1 S/P LUMBAR FUSION: Primary | ICD-10-CM

## 2025-06-24 PROCEDURE — 99024 POSTOP FOLLOW-UP VISIT: CPT | Performed by: ORTHOPAEDIC SURGERY

## 2025-06-24 NOTE — PROGRESS NOTES
Patient ID: Muna Lomax is a 74 y.o. female    Chief Compliant:  No chief complaint on file.       Diagnostic imaging:    AP lateral lumbar spine status post L2 to sacrum PLIF    Assessment and Plan:  1. S/P lumbar fusion      2 weeks post L2-3 and L3-4 PLIF and revision posterior instrumentation L4-sacrum, 06/09/2025.     Incision is clean, dry and intact. No signs of infection. Sutures discontinued.      DME for ankle-foot orthrosis    Follow up 4 weeks    HPI:  This is a 74 y.o. female who presents to the clinic today 2 weeks post L2-3 and L3-4 PLIF and revision posterior instrumentation L4-sacrum, 06/09/2025.     She is feeling much better than her pre-operative state.    She is currently completing at-home therapy (PT and OT) , where she is working on her lower extremity strength.     She has some difficulty ambulating.    Patient ambulates with a cane.    Review of Systems   All other systems reviewed and are negative.      Past History:    Current Outpatient Medications:     pantoprazole (PROTONIX) 40 MG tablet, Take 1 tablet by mouth every morning (before breakfast), Disp: 90 tablet, Rfl: 1    spironolactone (ALDACTONE) 50 MG tablet, TAKE 1 TABLET BY MOUTH EVERY DAY WITH LUNCH, Disp: 90 tablet, Rfl: 0    simvastatin (ZOCOR) 20 MG tablet, TAKE 1 TABLET BY MOUTH EVERY DAY, Disp: 90 tablet, Rfl: 0    cloNIDine (CATAPRES) 0.3 MG tablet, TAKE 1 TABLET BY MOUTH AT NIGHT, Disp: 90 tablet, Rfl: 0    gabapentin (NEURONTIN) 300 MG capsule, Take 1 capsule by mouth 3 times daily for 90 doses., Disp: 90 capsule, Rfl: 3    senna (SENOKOT) 8.6 MG tablet, Take 1 tablet by mouth daily as needed for Constipation, Disp: , Rfl:     carvedilol (COREG) 3.125 MG tablet, Take 1 tablet by mouth 2 times daily, Disp: 180 tablet, Rfl: 4    triamcinolone (KENALOG) 0.1 % ointment, Apply topically 2 times daily Apply as needed for vulvar itching, Disp: 30 g, Rfl: 3    clindamycin (CLEOCIN) 300 MG capsule, Patient will take 6 prior to

## 2025-06-26 ENCOUNTER — CARE COORDINATION (OUTPATIENT)
Dept: CARE COORDINATION | Age: 75
End: 2025-06-26

## 2025-06-26 NOTE — CARE COORDINATION
Care Transitions Note    Follow Up Call     Attempted to reach patient for transitions of care follow up.  Unable to reach patient.      Outreach Attempts:   HIPAA compliant voicemail left for patient.     Care Summary Note: 1st attempt.     Follow Up Appointment:   Future Appointments         Provider Specialty Dept Phone    7/22/2025 9:00 AM Noam Vallejo MD Orthopedic Surgery [phone]    9/5/2025 9:45 AM Angel Luis Caal DPM Podiatry [phone]    11/11/2025 12:30 PM Maribel Jaramillo MD Cardiology [phone]    12/23/2025 8:30 AM Ladi Ramirez PA-C Primary Care [phone]            Plan for follow-up call in 2-5 days based on severity of symptoms and risk factors. Plan for next call: what did Dr. Vallejo say? How is surgical site looking? Any improvement in numbness this week? How is therapy going?      Gia Palmer LPN

## 2025-06-30 ENCOUNTER — CARE COORDINATION (OUTPATIENT)
Dept: CARE COORDINATION | Age: 75
End: 2025-06-30

## 2025-06-30 NOTE — CARE COORDINATION
Care Transitions Note    Follow Up Call     Patient Current Location:  Home: [address] [place] [address]    Care Transition Nurse contacted the patient by telephone. Verified name and  as identifiers.    Additional needs identified to be addressed with provider   No needs identified                 Method of communication with provider: none.    Care Summary Note: Spoke with patient today for transitional follow up call. Patient states she has been doing well this past week, was seen by Dr. Vallejo, notes reviewed, appears to be healing nicely but still has left foot drop.  She has been working with therapy, states she can lift the foot just a fraction but not much more, feels she has made a little progression.  She is doing well post surgery and recovering nicely.  She did say she has a sore muscle to her lower back right above left buttock that she feels may have come on after she went to granddaughter's softball  game, states she was not wearing real supportive shoes and sat in a lawn chair for the game.  She did discuss with her physical therapist about this, was recommended she use ice throughout the day to help it settle down some.  She is eating and drinking well, bowels are moving regularly now, has not had any further issues with constipation since her spouse gave her an enema a couple of weeks ago.  She continues to work with therapy, is using cane when out and about.  Denies any needs or concerns at this time.     Plan of care updates since last contact:  None       Advance Care Planning:   Does patient have an Advance Directive: reviewed during previous call, see note. .    Medication Review:  Full medication review completed during previous call.    Remote Patient Monitoring:  Offered patient enrollment in the Remote Patient Monitoring (RPM) program for in-home monitoring: Patient is not eligible for RPM program because: patient does not have qualifying diagnosis.    Assessments:  Care Transitions

## 2025-07-02 ENCOUNTER — TELEPHONE (OUTPATIENT)
Dept: ORTHOPEDIC SURGERY | Age: 75
End: 2025-07-02

## 2025-07-02 NOTE — TELEPHONE ENCOUNTER
Patient stated she did not call regarding an MRI.  Patient did note that she has had a fall since her surgery but denies back pain at this time.  Patient was told it would be noted in her chart and to contact office if anything changes.

## 2025-07-02 NOTE — TELEPHONE ENCOUNTER
----- Message from Merissa MELGOZA sent at 7/2/2025 12:12 PM EDT -----  Regarding: Specialty Results Request  Specialty Results Request    Which lab or imaging result is the patient calling about:  MRI    Which provider ordered the test?    Was this a [facility] Provider: Yes    Date the test was preformed (MM/DOROTEO/YYYY):    Additional Information: Pt WANTS TO KNOW WHAT THE TEST SAYS AND WHAT ARE THE NEXT STEPS.   --------------------------------------------------------------------------------------------------------------------------    Relationship to Patient: Self    Call Back Info: OK to leave message on voicemail  Preferred Call Back Number: [phone]

## 2025-07-07 ENCOUNTER — CARE COORDINATION (OUTPATIENT)
Dept: CARE COORDINATION | Age: 75
End: 2025-07-07

## 2025-07-07 NOTE — CARE COORDINATION
Care Transitions Note    Final Call     Patient Current Location:  [facility] Care Coordinator contacted the patient by telephone. Verified name and  as identifiers.    Patient graduated from the Care Transitions program on 25.  Patient/family verbalizes confidence in the ability to self-manage at this time..      Advance Care Planning:   Does patient have an Advance Directive: reviewed during previous call, see note. .    Handoff:   Patient was not referred to the ACM team due to no additional needs identified.       Care Summary Note: Writer spoke to Muna for follow up transitional care call. She returned writer's call.She is S/P lumbar Kinney and fusion . She states that she driving home from eye doctor appointment. She states that she is healing very nice no issues with incisions. Denies fever,chills, loss of b/b states LT foot is better she has been wearing barefoot shoes which she feels has helped. She is using a cane to walk, continues with home PT. She feels that she is doing well eating/drinking fine, normal BM and voiding. Okay with ending care transition calls. Will reach out to PCP /care team if she has concerns.     Assessments:  Care Transitions Subsequent and Final Call    Subsequent and Final Calls  Do you have any ongoing symptoms?: No  Have your medications changed?: No  Do you have any questions related to your medications?: No  Do you currently have any active services?: Yes  Are you currently active with any services?: Home Health  Do you have any needs or concerns that I can assist you with?: No  Identified Barriers: None  Care Transitions Interventions  Other Interventions:              Upcoming Appointments:    Future Appointments         Provider Specialty Dept Phone    2025 9:00 AM Noam Vallejo MD Orthopedic Surgery [phone]    2025 9:45 AM Angle Luis Caal DPM Podiatry [phone]    2025 12:30 PM Maribel Jaramillo MD Cardiology [phone]

## 2025-07-07 NOTE — CARE COORDINATION
Care Transitions Note    Follow Up Call     Attempted to reach patient for transitions of care follow up.  Unable to reach patient.      Outreach Attempts: #1  HIPAA compliant voicemail left for patient.     Care Summary Note: #1     Follow Up Appointment:   Future Appointments         Provider Specialty Dept Phone    7/22/2025 9:00 AM Noam Vallejo MD Orthopedic Surgery [phone]    9/5/2025 9:45 AM Angel Luis Caal DPM Podiatry [phone]    11/11/2025 12:30 PM Maribel Jaramillo MD Cardiology [phone]    12/23/2025 8:30 AM Ladi Ramirez PA-C Primary Care [phone]            Plan for follow-up on next business day.  based on severity of symptoms and risk factors. Plan for next call:  How is back feeling? Any improvement in foot drop this week? Any additional needs? Will be final outreach.         Twyla Jane LPN

## 2025-07-22 ENCOUNTER — OFFICE VISIT (OUTPATIENT)
Dept: ORTHOPEDIC SURGERY | Age: 75
End: 2025-07-22

## 2025-07-22 VITALS — BODY MASS INDEX: 33.97 KG/M2 | RESPIRATION RATE: 14 BRPM | HEIGHT: 64 IN | WEIGHT: 199 LBS

## 2025-07-22 DIAGNOSIS — Z98.1 S/P LUMBAR FUSION: Primary | ICD-10-CM

## 2025-07-22 DIAGNOSIS — M21.372 LEFT FOOT DROP: ICD-10-CM

## 2025-07-22 PROCEDURE — 99024 POSTOP FOLLOW-UP VISIT: CPT | Performed by: ORTHOPAEDIC SURGERY

## 2025-07-22 NOTE — PROGRESS NOTES
History    Not on file   Tobacco Use    Smoking status: Never    Smokeless tobacco: Never   Vaping Use    Vaping status: Never Used   Substance and Sexual Activity    Alcohol use: Yes     Types: 1 Glasses of wine, 1 Cans of beer, 1 Shots of liquor per week     Comment: social    Drug use: No    Sexual activity: Not on file   Other Topics Concern    Not on file   Social History Narrative    Not on file     Social Drivers of Health     Financial Resource Strain: Low Risk  (4/10/2024)    Overall Financial Resource Strain (CARDIA)     Difficulty of Paying Living Expenses: Not hard at all   Food Insecurity: No Food Insecurity (6/9/2025)    Hunger Vital Sign     Worried About Running Out of Food in the Last Year: Never true     Ran Out of Food in the Last Year: Never true   Transportation Needs: No Transportation Needs (6/9/2025)    PRAPARE - Transportation     Lack of Transportation (Medical): No     Lack of Transportation (Non-Medical): No   Physical Activity: Insufficiently Active (12/9/2024)    Exercise Vital Sign     Days of Exercise per Week: 3 days     Minutes of Exercise per Session: 30 min   Stress: Not on file   Social Connections: Not on file   Intimate Partner Violence: Not At Risk (10/2/2022)    Humiliation, Afraid, Rape, and Kick questionnaire     Fear of Current or Ex-Partner: No     Emotionally Abused: No     Physically Abused: No     Sexually Abused: No   Housing Stability: Low Risk  (6/9/2025)    Housing Stability Vital Sign     Unable to Pay for Housing in the Last Year: No     Number of Times Moved in the Last Year: 0     Homeless in the Last Year: No     Past Medical History:   Diagnosis Date    Arthritis     Asthma     \"winter\" asthma    Bronchitis     CKD (chronic kidney disease) stage 3, GFR 30-59 ml/min ([facility])     Constipation     GERD (gastroesophageal reflux disease)     Hyperlipidemia     Hypertension     Hypokalemia     Overactive bladder     Prolonged emergence from general anesthesia

## 2025-07-30 DIAGNOSIS — Z98.1 S/P LUMBAR FUSION: ICD-10-CM

## 2025-07-30 DIAGNOSIS — M21.372 LEFT FOOT DROP: ICD-10-CM

## 2025-08-18 RX ORDER — CARVEDILOL 3.12 MG/1
3.12 TABLET ORAL 2 TIMES DAILY
Qty: 180 TABLET | Refills: 4 | Status: SHIPPED | OUTPATIENT
Start: 2025-08-18

## 2025-08-25 RX ORDER — GABAPENTIN 300 MG/1
300 CAPSULE ORAL 3 TIMES DAILY
Qty: 90 CAPSULE | Refills: 3 | Status: SHIPPED | OUTPATIENT
Start: 2025-08-25 | End: 2025-12-23

## 2025-08-26 DIAGNOSIS — I10 ESSENTIAL HYPERTENSION: ICD-10-CM

## 2025-08-26 RX ORDER — SIMVASTATIN 20 MG
20 TABLET ORAL DAILY
Qty: 90 TABLET | Refills: 0 | Status: SHIPPED | OUTPATIENT
Start: 2025-08-26

## 2025-08-26 RX ORDER — CLONIDINE HYDROCHLORIDE 0.3 MG/1
0.3 TABLET ORAL NIGHTLY
Qty: 90 TABLET | Refills: 0 | Status: SHIPPED | OUTPATIENT
Start: 2025-08-26

## 2025-08-29 DIAGNOSIS — I10 ESSENTIAL HYPERTENSION: ICD-10-CM

## 2025-08-29 RX ORDER — CLONIDINE HYDROCHLORIDE 0.3 MG/1
0.3 TABLET ORAL NIGHTLY
Qty: 90 TABLET | Refills: 0 | Status: CANCELLED | OUTPATIENT
Start: 2025-08-29

## 2025-09-02 ENCOUNTER — OFFICE VISIT (OUTPATIENT)
Dept: ORTHOPEDIC SURGERY | Age: 75
End: 2025-09-02

## 2025-09-02 VITALS — RESPIRATION RATE: 14 BRPM | BODY MASS INDEX: 33.97 KG/M2 | WEIGHT: 199 LBS | HEIGHT: 64 IN

## 2025-09-02 DIAGNOSIS — Z98.1 HISTORY OF LUMBAR FUSION: ICD-10-CM

## 2025-09-02 DIAGNOSIS — Z98.1 S/P LUMBAR FUSION: Primary | ICD-10-CM

## 2025-09-02 DIAGNOSIS — M21.372 LEFT FOOT DROP: ICD-10-CM

## 2025-09-02 PROCEDURE — 99024 POSTOP FOLLOW-UP VISIT: CPT | Performed by: ORTHOPAEDIC SURGERY

## 2025-09-03 DIAGNOSIS — I10 ESSENTIAL HYPERTENSION: ICD-10-CM

## 2025-09-03 RX ORDER — SPIRONOLACTONE 50 MG/1
TABLET, FILM COATED ORAL
Qty: 90 TABLET | Refills: 0 | Status: SHIPPED | OUTPATIENT
Start: 2025-09-03

## (undated) DEVICE — Z DISCONTINUED USE 2424143 ADAPTER O2 SWVL CHRISTMAS TREE GRN

## (undated) DEVICE — GLOVE ORTHO 8   MSG9480

## (undated) DEVICE — GOWN,SIRUS,NONRNF,SETINSLV,XL,20/CS: Brand: MEDLINE

## (undated) DEVICE — STRAP,POSITIONING,KNEE/BODY,FOAM,4X60": Brand: MEDLINE

## (undated) DEVICE — SPONGE DRN W4XL4IN RAYON/POLYESTER 6 PLY NONWOVEN PRECUT 2 PER PK

## (undated) DEVICE — CHARGING SYSTEM: Brand: AXONICS

## (undated) DEVICE — ELECTRODE PT RET AD L9FT HI MOIST COND ADH HYDRGEL CORDED

## (undated) DEVICE — GOWN,AURORA,NONREINFORCED,LARGE: Brand: MEDLINE

## (undated) DEVICE — PROBE NEUROMONITORING BALL TIP DISP

## (undated) DEVICE — PROTECTOR ULN NRV PUR FOAM HK LOOP STRP ANATOMICALLY

## (undated) DEVICE — SNAP KOVER: Brand: UNBRANDED

## (undated) DEVICE — GLOVE SURG SZ 8 L12IN FNGR THK79MIL GRN LTX FREE

## (undated) DEVICE — SUTURE VCRL + SZ 3-0 L27IN ABSRB UD L26MM SH 1/2 CIR VCP416H

## (undated) DEVICE — PADDING UNDERCAST W6INXL4YD WYTEX 6 PER BG

## (undated) DEVICE — ADHESIVE SKIN CLSR 0.7ML TOP DERMBND ADV

## (undated) DEVICE — SUTURE PERMAHAND SZ 3-0 L30IN NONABSORBABLE BLK SH L26MM K832H

## (undated) DEVICE — GAUZE,SPONGE,FLUFF,6"X6.75",STRL,5/TRAY: Brand: MEDLINE

## (undated) DEVICE — SUTURE VICRYL + SZ 2-0 L27IN ABSRB UD CP-1 1/2 CIR REV CUT VCP266H

## (undated) DEVICE — [TOMCAT CUTTER, ARTHROSCOPIC SHAVER BLADE,  DO NOT RESTERILIZE,  DO NOT USE IF PACKAGE IS DAMAGED,  KEEP DRY,  KEEP AWAY FROM SUNLIGHT]: Brand: FORMULA

## (undated) DEVICE — FORCEPS BX L240CM WRK CHN 2.8MM STD CAP W/ NDL MIC MESH

## (undated) DEVICE — DEFENDO AIR WATER SUCTION AND BIOPSY VALVE KIT FOR  OLYMPUS: Brand: DEFENDO AIR/WATER/SUCTION AND BIOPSY VALVE

## (undated) DEVICE — GLOVE ORANGE PI 7 1/2   MSG9075

## (undated) DEVICE — SUTURE VICRYL + SZ 2 L27IN ABSRB UD L40MM CP 1/2 CIR REV CUT VCP195H

## (undated) DEVICE — SNARE ENDOSCP L240CM LOOP W13MM DIA2.4MM SHT THROW SM OVL

## (undated) DEVICE — DISPOSABLE BALL TIP NERVE STIMULATOR PROBE

## (undated) DEVICE — TRIAL STIMULATOR: Brand: AXONICS

## (undated) DEVICE — ENDO KIT W/SYRINGE: Brand: MEDLINE INDUSTRIES, INC.

## (undated) DEVICE — C-ARMOR C-ARM EQUIPMENT COVERS CLEAR STERILE UNIVERSAL FIT 12 PER CASE: Brand: C-ARMOR

## (undated) DEVICE — PACK ARTHRO W PCH

## (undated) DEVICE — Z INACTIVE USE 2525529 CONNECTOR TBNG FOR O2

## (undated) DEVICE — SYRINGE MED 10ML LUERLOCK TIP W/O SFTY DISP

## (undated) DEVICE — GAUZE,SPONGE,4"X4",16PLY,XRAY,STRL,LF: Brand: MEDLINE

## (undated) DEVICE — POSITIONER HD REST FOAM CMFRT TCH

## (undated) DEVICE — SOLUTION SCRB 4OZ 4% CHG H2O AIDED FOR PREOPERATIVE SKIN

## (undated) DEVICE — SURGIFOAM SPNG SZ 100

## (undated) DEVICE — ELECTRODE ES L3IN S STL BLDE INSUL DISP VALLEYLAB EDGE

## (undated) DEVICE — DRESSING HYDROCOLLOID BORDER 35X10 IN ALUM PRIMASEAL

## (undated) DEVICE — ZIMMER® STERILE DISPOSABLE TOURNIQUET CUFF WITH PLC, DUAL PORT, SINGLE BLADDER, 30 IN. (76 CM)

## (undated) DEVICE — DRESSING TRNSPAR W5XL4.5IN FLM SHT SEMIPERMEABLE WIND

## (undated) DEVICE — Z DUPLICATE USE 2527422 TUBING O2 STD 7 FT EXTN NO CRUSH VISUAL CNTRST LF

## (undated) DEVICE — STRIP,CLOSURE,WOUND,MEDI-STRIP,1/2X4: Brand: MEDLINE

## (undated) DEVICE — SOLUTION IRRIG 1000ML STRL H2O USP PLAS POUR BTL

## (undated) DEVICE — GLOVE SURG SZ 75 L12IN THK75MIL DK GRN LTX FREE

## (undated) DEVICE — MHPB GEN MINOR PACK: Brand: MEDLINE INDUSTRIES, INC.

## (undated) DEVICE — DRESSING,GAUZE,XEROFORM,CURAD,1"X8",ST: Brand: CURAD

## (undated) DEVICE — APPLICATOR MEDICATED 26 CC SOLUTION HI LT ORNG CHLORAPREP

## (undated) DEVICE — SUTURE MCRYL + SZ 4-0 L27IN ABSRB UD L19MM PS-2 3/8 CIR MCP426H

## (undated) DEVICE — MARKER,SKIN,WI/RULER AND LABELS: Brand: MEDLINE

## (undated) DEVICE — SOLUTION IV IRRIG WATER 1000ML POUR BRL 2F7114

## (undated) DEVICE — PAD,ABDOMINAL,8"X7.5",ST,LF,20/BX: Brand: MEDLINE INDUSTRIES, INC.

## (undated) DEVICE — 3M™ IOBAN™ 2 ANTIMICROBIAL INCISE DRAPE 6650EZ: Brand: IOBAN™ 2

## (undated) DEVICE — PERCUTANEOUS EXTENSION: Brand: AXONICS

## (undated) DEVICE — 5.0MM X-COARSE DIAMOND

## (undated) DEVICE — SINGLE PORT MANIFOLD: Brand: NEPTUNE 2

## (undated) DEVICE — AGENT HEMOSTATIC SURGIFLOW MATRIX KIT W/THROMBIN

## (undated) DEVICE — GOWN,POLY REINFORCED,LG: Brand: MEDLINE

## (undated) DEVICE — ERBE NESSY® OMEGA PLATE USA (85+23)CM² , WITH CABLE 3 M: Brand: ERBE

## (undated) DEVICE — SOLUTION IRRIG 1000ML H2O PIC PLAS SHATTERPROOF CONTAINER

## (undated) DEVICE — CANNULA NSL AD TBNG L7FT PVC STR NONFLARED PRNG O2 DEL W STD

## (undated) DEVICE — GLOVE SURG SZ 75 L12IN FNGR THK79MIL GRN LTX FREE

## (undated) DEVICE — AXONICS LEAD IMPLANT KIT

## (undated) DEVICE — POLYP TRAP: Brand: TRAPEASE®

## (undated) DEVICE — MONITORING NEURO WO INTERPRETATION SYS PRICING

## (undated) DEVICE — CLIP LIG L235CM RESOL 360 BX/20

## (undated) DEVICE — PENCIL ES L3M BTTN SWCH HOLSTER W/ BLDE ELECTRD EDGE

## (undated) DEVICE — KIT DRN FLAT W/ 100CC EVAC 7MM FULL PERF

## (undated) DEVICE — SOLUTION IV IRRIG LACTATED RINGERS 3000ML 2B7487

## (undated) DEVICE — Z DISCONTINUED BY MEDLINE USE 2711682 TRAY SKIN PREP DRY W/ PREM GLV

## (undated) DEVICE — SUTURE ETHLN SZ 3-0 L18IN NONABSORBABLE BLK FS-1 L24MM 3/8 663H

## (undated) DEVICE — GLOVE SURG SZ 8 L12IN FNGR THK13MIL BRN LTX SYN POLYMER W

## (undated) DEVICE — ST CHARLES DR BEEKS SPINE: Brand: MEDLINE INDUSTRIES, INC.

## (undated) DEVICE — SPONGE,NEURO,0.5"X0.5",XR,STRL,10/PK: Brand: MEDLINE

## (undated) DEVICE — SYRINGE MED 50ML LUERSLIP TIP

## (undated) DEVICE — BLANKET WRM W40.2XL55.9IN IORT LO BODY + MISTRAL AIR

## (undated) DEVICE — JELLY,LUBE,STERILE,FLIP TOP,TUBE,2-OZ: Brand: MEDLINE

## (undated) DEVICE — COVER LT HNDL BLU PLAS

## (undated) DEVICE — NEEDLE, QUINCKE, 18GX3.5": Brand: MEDLINE

## (undated) DEVICE — Device

## (undated) DEVICE — REMOTE CONTROL: Brand: AXONICS

## (undated) DEVICE — SOLUTION IRRIG 1000ML 09% SOD CHL USP PIC PLAS CONTAINER

## (undated) DEVICE — 3M™ WARMING BLANKET, UPPER BODY, 10 PER CASE, 42268: Brand: BAIR HUGGER™

## (undated) DEVICE — SOLUTION IRRIG 1000ML 0.9% SOD CHL USP POUR PLAS BTL

## (undated) DEVICE — SUTURE ETHILON SZ 3-0 L18IN NONABSORBABLE BLK L24MM FS-1 3/8 663G